# Patient Record
Sex: FEMALE | Race: WHITE | Employment: OTHER | URBAN - METROPOLITAN AREA
[De-identification: names, ages, dates, MRNs, and addresses within clinical notes are randomized per-mention and may not be internally consistent; named-entity substitution may affect disease eponyms.]

---

## 2017-02-06 PROBLEM — F33.1 MODERATE EPISODE OF RECURRENT MAJOR DEPRESSIVE DISORDER (HCC): Status: ACTIVE | Noted: 2017-02-06

## 2017-03-03 PROBLEM — E03.9 ACQUIRED HYPOTHYROIDISM: Status: ACTIVE | Noted: 2017-03-03

## 2017-03-03 PROBLEM — E78.5 HYPERLIPIDEMIA: Status: ACTIVE | Noted: 2017-03-03

## 2017-10-16 PROBLEM — E66.09 CLASS 2 OBESITY DUE TO EXCESS CALORIES WITHOUT SERIOUS COMORBIDITY WITH BODY MASS INDEX (BMI) OF 35.0 TO 35.9 IN ADULT: Status: ACTIVE | Noted: 2017-10-16

## 2017-11-25 ENCOUNTER — HOSPITAL ENCOUNTER (EMERGENCY)
Age: 57
Discharge: HOME | End: 2017-11-25
Attending: EMERGENCY MEDICINE
Payer: COMMERCIAL

## 2017-11-25 ENCOUNTER — APPOINTMENT (OUTPATIENT)
Dept: GENERAL RADIOLOGY | Age: 57
End: 2017-11-25
Attending: EMERGENCY MEDICINE
Payer: COMMERCIAL

## 2017-11-25 VITALS
RESPIRATION RATE: 18 BRPM | HEART RATE: 93 BPM | SYSTOLIC BLOOD PRESSURE: 152 MMHG | WEIGHT: 204 LBS | TEMPERATURE: 98.2 F | DIASTOLIC BLOOD PRESSURE: 70 MMHG | OXYGEN SATURATION: 99 % | BODY MASS INDEX: 34.83 KG/M2 | HEIGHT: 64 IN

## 2017-11-25 DIAGNOSIS — M86.172 ACUTE OSTEOMYELITIS OF LEFT FOOT (HCC): Primary | ICD-10-CM

## 2017-11-25 LAB
ALBUMIN SERPL-MCNC: 3.9 G/DL (ref 3.5–5)
ALBUMIN/GLOB SERPL: 1.1 {RATIO} (ref 1.2–3.5)
ALP SERPL-CCNC: 104 U/L (ref 50–136)
ALT SERPL-CCNC: 24 U/L (ref 12–65)
ANION GAP SERPL CALC-SCNC: 12 MMOL/L (ref 7–16)
AST SERPL-CCNC: 15 U/L (ref 15–37)
BASOPHILS # BLD: 0 K/UL (ref 0–0.2)
BASOPHILS NFR BLD: 0 % (ref 0–2)
BILIRUB SERPL-MCNC: 0.4 MG/DL (ref 0.2–1.1)
BUN SERPL-MCNC: 16 MG/DL (ref 6–23)
CALCIUM SERPL-MCNC: 9.2 MG/DL (ref 8.3–10.4)
CHLORIDE SERPL-SCNC: 99 MMOL/L (ref 98–107)
CO2 SERPL-SCNC: 24 MMOL/L (ref 21–32)
CREAT SERPL-MCNC: 1.04 MG/DL (ref 0.6–1)
DIFFERENTIAL METHOD BLD: ABNORMAL
EOSINOPHIL # BLD: 0.3 K/UL (ref 0–0.8)
EOSINOPHIL NFR BLD: 3 % (ref 0.5–7.8)
ERYTHROCYTE [DISTWIDTH] IN BLOOD BY AUTOMATED COUNT: 12.4 % (ref 11.9–14.6)
GLOBULIN SER CALC-MCNC: 3.6 G/DL (ref 2.3–3.5)
GLUCOSE SERPL-MCNC: 91 MG/DL (ref 65–100)
HCT VFR BLD AUTO: 30.7 % (ref 35.8–46.3)
HGB BLD-MCNC: 12.5 G/DL (ref 11.7–15.4)
IMM GRANULOCYTES # BLD: 0 K/UL (ref 0–0.5)
IMM GRANULOCYTES NFR BLD AUTO: 0 % (ref 0–5)
LACTATE BLD-SCNC: 0.5 MMOL/L (ref 0.5–1.9)
LYMPHOCYTES # BLD: 1.9 K/UL (ref 0.5–4.6)
LYMPHOCYTES NFR BLD: 19 % (ref 13–44)
MCH RBC QN AUTO: 36.7 PG (ref 26.1–32.9)
MCHC RBC AUTO-ENTMCNC: 40.7 G/DL (ref 31.4–35)
MCV RBC AUTO: 90 FL (ref 79.6–97.8)
MONOCYTES # BLD: 0.8 K/UL (ref 0.1–1.3)
MONOCYTES NFR BLD: 8 % (ref 4–12)
NEUTS SEG # BLD: 6.9 K/UL (ref 1.7–8.2)
NEUTS SEG NFR BLD: 70 % (ref 43–78)
PLATELET # BLD AUTO: 362 K/UL (ref 150–450)
PMV BLD AUTO: 9.7 FL (ref 10.8–14.1)
POTASSIUM SERPL-SCNC: 3.9 MMOL/L (ref 3.5–5.1)
PROT SERPL-MCNC: 7.5 G/DL (ref 6.3–8.2)
RBC # BLD AUTO: 3.41 M/UL (ref 4.05–5.25)
SODIUM SERPL-SCNC: 135 MMOL/L (ref 136–145)
WBC # BLD AUTO: 10 K/UL (ref 4.3–11.1)

## 2017-11-25 PROCEDURE — 85025 COMPLETE CBC W/AUTO DIFF WBC: CPT | Performed by: EMERGENCY MEDICINE

## 2017-11-25 PROCEDURE — 83605 ASSAY OF LACTIC ACID: CPT

## 2017-11-25 PROCEDURE — 87040 BLOOD CULTURE FOR BACTERIA: CPT | Performed by: EMERGENCY MEDICINE

## 2017-11-25 PROCEDURE — 87070 CULTURE OTHR SPECIMN AEROBIC: CPT | Performed by: EMERGENCY MEDICINE

## 2017-11-25 PROCEDURE — 80053 COMPREHEN METABOLIC PANEL: CPT | Performed by: EMERGENCY MEDICINE

## 2017-11-25 PROCEDURE — 73630 X-RAY EXAM OF FOOT: CPT

## 2017-11-25 PROCEDURE — 99282 EMERGENCY DEPT VISIT SF MDM: CPT | Performed by: EMERGENCY MEDICINE

## 2017-11-25 PROCEDURE — 87186 SC STD MICRODIL/AGAR DIL: CPT | Performed by: EMERGENCY MEDICINE

## 2017-11-25 PROCEDURE — 87077 CULTURE AEROBIC IDENTIFY: CPT | Performed by: EMERGENCY MEDICINE

## 2017-11-25 RX ORDER — CIPROFLOXACIN 500 MG/1
750 TABLET ORAL 2 TIMES DAILY
Qty: 20 TAB | Refills: 0 | Status: SHIPPED | OUTPATIENT
Start: 2017-11-25 | End: 2017-12-05

## 2017-11-26 NOTE — DISCHARGE INSTRUCTIONS
Osteomyelitis: Care Instructions  Your Care Instructions  Osteomyelitis (say \"nr-[id number]-[id number]-wb-rt-VE-tus\") is a bone infection. It is caused by bacteria. The bacteria can infect the bone where it has been injured, or they can be carried through the blood from another area in the body. Osteomyelitis can be a short- or long-term problem. It is treated with antibiotics. You may get the antibiotics as pills or through a needle in a vein (IV). You will probably get treatment in the hospital at first. The type of treatment depends on the type of bacteria causing the infection, the bones affected, and how bad the infection is. Sometimes people need surgery to drain pus from bone or to fix damaged bone. Short-term osteomyelitis that is treated right away usually can be cured. But the long-term form sometimes comes back after treatment. You can help your chances of stopping the infection by taking your medicines as directed. Follow-up care is a key part of your treatment and safety. Be sure to make and go to all appointments, and call your doctor if you are having problems. It's also a good idea to know your test results and keep a list of the medicines you take. How can you care for yourself at home? · Take your antibiotics as directed. Do not stop taking them just because you feel better. You need to take the full course of antibiotics. · Take pain medicines exactly as directed. ¨ If the doctor gave you a prescription medicine for pain, take it as prescribed. ¨ If you are not taking a prescription pain medicine, ask your doctor if you can take an over-the-counter medicine. · Do mild exercise and stretching if your doctor says it is okay. This can help keep your bones and muscles healthy. Avoid strenuous work or exercise until your doctor says you can do it. · Consider physical therapy if your doctor suggests it. Physical therapy may help you have a normal range of movement. · Do not smoke.  Smoking can slow healing of the infection. If you need help quitting, talk to your doctor about stop-smoking programs and medicines. These can increase your chances of quitting for good. When should you call for help? Call 911 anytime you think you may need emergency care. For example, call if:  ? · You have severe bone pain. ?Call your doctor now or seek immediate medical care if:  ? · You continue to have bone pain. ? · You have signs of infection, such as:  ¨ Increased pain, swelling, warmth, or redness. ¨ Red streaks leading from a wound. ¨ Pus draining from a wound. ¨ A fever. ? Watch closely for changes in your health, and be sure to contact your doctor if:  ? · You do not get better as expected. Where can you learn more? Go to http://gerardo-shakira.info/. Enter Y271 in the search box to learn more about \"Osteomyelitis: Care Instructions. \"  Current as of: March 20, 2017  Content Version: 11.4  © 8839-6834 [employer]. Care instructions adapted under license by [employer] (which disclaims liability or warranty for this information). If you have questions about a medical condition or this instruction, always ask your healthcare professional. [place] [address] any warranty or liability for your use of this information.

## 2017-11-26 NOTE — ED NOTES
Patient to ED with significant swelling, redness to LLE. Patient reports significant swelling over the past 24 hours as well as a weeping wound to the foot with puss production. Patient states had foot surgery in August, had one infection since. Patient in contact with surgeon's office, was written ABX x 2.

## 2017-11-26 NOTE — ED PROVIDER NOTES
Patient is a 59 y.o. female presenting with skin problem. The history is provided by the patient. Skin Problem    This is a new problem. The current episode started 2 days ago. The problem has been gradually worsening. Associated with: at site of previous surgery for arthritis in August. There has been no fever. Affected Location: left dorsal foot. The pain is moderate. The pain has been constant since onset. Associated symptoms include pain and weeping. Treatments tried: started Cipro and Augmentin yesterday.         Past Medical History:   Diagnosis Date    Allergic rhinitis 4/3/2014    Anxiety 5/30/2014    Asthma 5/30/2014    Circadian rhythm disorder     COPD (chronic obstructive pulmonary disease) ([facility] [address] 4/3/2014    DDD (degenerative disc disease) 4/3/2014    Depression, major, recurrent ([facility] [address] 4/3/2014    controlled with meds     GERD (gastroesophageal reflux disease) 4/3/2014    controlled with meds     HLD (hyperlipidemia) 4/3/2014    Hyposmolality and/or hyponatremia 4/3/2014    Hypothyroidism 5/30/2014    controlled with medication     Leg edema     Neurogenic bladder 8/14    self cath 4 times daily, sees Dr. Caba Shows Obesity 4/3/2014    Sleep apnea 5/10/2014    CPAP machine at hs     Unspecified adverse effect of anesthesia     difficult to wake up     Vitamin D deficiency 4/3/2014    Xerostomia 4/3/2014       Past Surgical History:   Procedure Laterality Date    HX APPENDECTOMY      HX CARPAL TUNNEL RELEASE Right     HX HEENT      cataract bilat    HX HYSTERECTOMY  1992    only L ovary remaining     HX OOPHORECTOMY      one side only-unsure which side    HX ORTHOPAEDIC      R carpal tunnel release, L foot surg, R knee    HX TUBAL LIGATION           Family History:   Problem Relation Age of Onset    Cancer Mother      breast    Breast Cancer Mother     Emphysema Father     Lung Disease Father        Social History     Social History    Marital status:      Spouse name: N/A    Number of children: N/A    Years of education: N/A     Occupational History    Not on file. Social History Main Topics    Smoking status: Former Smoker     Packs/day: 1.00     Years: 30.00     Quit date: 2/19/2011    Smokeless tobacco: Never Used    Alcohol use No    Drug use: No    Sexual activity: Not on file     Other Topics Concern    Not on file     Social History Narrative         ALLERGIES: Oxycodone    Review of Systems   Constitutional: Negative for chills and fever. Gastrointestinal: Negative for nausea and vomiting. Neurological: Negative for weakness and numbness. Vitals:    11/25/17 2016   BP: 150/84   Pulse: 91   Resp: 20   Temp: 98 °F (36.7 °C)   SpO2: 100%   Weight: 92.5 kg (204 lb)   Height: 5' 4\" (1.626 m)            Physical Exam   Constitutional: She appears well-developed and well-nourished. Cardiovascular: Normal rate, regular rhythm and normal heart sounds. Pulmonary/Chest: Effort normal and breath sounds normal.   Musculoskeletal:        Feet:    Skin: Skin is warm and dry. There is erythema (warmth and erythema and swelling dorsal foot with a\"her on the dorsolateral foot with. Discharge. Moderate tenderness to palpation. ). Nursing note and vitals reviewed. MDM  Number of Diagnoses or Management Options  Diagnosis management comments: Patient on appropriate antibiotics for osteomyelitis and plain films suggest some osteomyelitis. We will increase the dose of Cipro to 750 mg twice a day. Augmentin 875 twice a day. She has an appointment on Monday with her orthopedic surgeon for follow-up and recheck. We have cultured the wound.        Amount and/or Complexity of Data Reviewed  Clinical lab tests: ordered and reviewed (Results for orders placed or performed during the hospital encounter of 11/25/17  -CBC WITH AUTOMATED DIFF       Result                                            Value                         Ref Range WBC                                               10.0                          4.3 - 11.1 K/uL                 RBC                                               3.41 (L)                      4.05 - 5.25 M/uL                HGB                                               12.5                          11.7 - 15.4 g/dL                HCT                                               30.7 (L)                      35.8 - 46.3 %                   MCV                                               90.0                          79.6 - 97.8 FL                  MCH                                               36.7 (H)                      26.1 - 32.9 PG                  MCHC                                              40.7 (H)                      31.4 - 35.0 g/dL                RDW                                               12.4                          11.9 - 14.6 %                   PLATELET                                          362                           150 - 450 K/uL                  MPV                                               9.7 (L)                       10.8 - 14.1 FL                  DF                                                AUTOMATED                                                     NEUTROPHILS                                       70                            43 - 78 %                       LYMPHOCYTES                                       19                            13 - 44 %                       MONOCYTES                                         8                             4.0 - 12.0 %                    EOSINOPHILS                                       3                             0.5 - 7.8 %                     BASOPHILS                                         0                             0.0 - 2.0 %                     IMMATURE GRANULOCYTES                             0                             0.0 - 5.0 %                     ABS.  NEUTROPHILS 6.9                           1.7 - 8.2 K/UL                  ABS. LYMPHOCYTES                                  1.9                           0.5 - 4.6 K/UL                  ABS. MONOCYTES                                    0.8                           0.1 - 1.3 K/UL                  ABS. EOSINOPHILS                                  0.3                           0.0 - 0.8 K/UL                  ABS. BASOPHILS                                    0.0                           0.0 - 0.2 K/UL                  ABS. IMM. GRANS.                                  0.0                           0.0 - 0.5 K/UL             -METABOLIC PANEL, COMPREHENSIVE       Result                                            Value                         Ref Range                       Sodium                                            135 (L)                       136 - 145 mmol/L                Potassium                                         3.9                           3.5 - 5.1 mmol/L                Chloride                                          99                            98 - 107 mmol/L                 CO2                                               24                            21 - 32 mmol/L                  Anion gap                                         12                            7 - 16 mmol/L                   Glucose                                           91                            65 - 100 mg/dL                  BUN                                               16                            6 - 23 MG/DL                    Creatinine                                        1. 04 (H)                      0.6 - 1.0 MG/DL                 GFR est AA                                        >60                           >60 ml/min/1.73m2               GFR est non-AA                                    58 (L)                        >60 ml/min/1.73m2               Calcium                                           9.2 8.3 - 10.4 MG/DL                Bilirubin, total                                  0.4                           0.2 - 1.1 MG/DL                 ALT (SGPT)                                        24                            12 - 65 U/L                     AST (SGOT)                                        15                            15 - 37 U/L                     Alk. phosphatase                                  104                           50 - 136 U/L                    Protein, total                                    7.5                           6.3 - 8.2 g/dL                  Albumin                                           3.9                           3.5 - 5.0 g/dL                  Globulin                                          3.6 (H)                       2.3 - 3.5 g/dL                  A-G Ratio                                         1.1 (L)                       1.2 - 3.5                  -POC LACTIC ACID       Result                                            Value                         Ref Range                       Lactic Acid (POC)                                 0.5                           0.5 - 1.9 mmol/L           )  Tests in the radiology section of CPT®: ordered and reviewed (Xr Foot Lt Min 3 V    Result Date: 11/25/2017  AP, lateral, oblique views left foot INDICATION: Foot pain, infection. Evaluate for osteomyelitis. COMPARISON: None FINDINGS: Postsurgical fusion at the first, second and third tarsometatarsal joints. Hardware elements appear intact. Sharp truncation at the bases of the fourth and fifth metatarsals may be postsurgical. There is osseous irregularity at the anterior margin of the cuboid. There is soft tissue gas at the lateral aspect of the foot. Findings suspicious for osteomyelitis. Bones are osteopenic. There is soft tissue swelling. IMPRESSION: 1.  Osteomyelitis is suspected, especially in the area of the third and fourth tarsometatarsal joints.  MRI is more sensitive.    )      ED Course       Procedures

## 2017-11-26 NOTE — ED NOTES
I have reviewed discharge instructions with the patient and spouse. The patient and spouse verbalized understanding. Patient left ED via Discharge Method: wheelchair to Home with spouse. Opportunity for questions and clarification provided. Patient given 1 scripts. To continue your aftercare when you leave the hospital, you may receive an automated call from our care team to check in on how you are doing. This is a free service and part of our promise to provide the best care and service to meet your aftercare needs.  If you have questions, or wish to unsubscribe from this service please call [phone]. Thank [facility].

## 2017-11-29 LAB
BACTERIA SPEC CULT: ABNORMAL
GRAM STN SPEC: ABNORMAL
GRAM STN SPEC: ABNORMAL
SERVICE CMNT-IMP: ABNORMAL

## 2017-11-30 LAB
BACTERIA SPEC CULT: NORMAL
SERVICE CMNT-IMP: NORMAL

## 2018-02-12 ENCOUNTER — HOSPITAL ENCOUNTER (OUTPATIENT)
Dept: MAMMOGRAPHY | Age: 58
Discharge: HOME | End: 2018-02-12
Attending: FAMILY MEDICINE
Payer: COMMERCIAL

## 2018-02-12 DIAGNOSIS — Z12.31 VISIT FOR SCREENING MAMMOGRAM: ICD-10-CM

## 2018-02-12 PROCEDURE — 77067 SCR MAMMO BI INCL CAD: CPT

## 2018-02-21 PROBLEM — E66.9 OBESITY (BMI 30-39.9): Status: ACTIVE | Noted: 2018-02-21

## 2018-02-21 PROBLEM — Z99.89 OBSTRUCTIVE SLEEP APNEA ON CPAP: Status: ACTIVE | Noted: 2018-02-21

## 2018-02-21 PROBLEM — G47.33 OBSTRUCTIVE SLEEP APNEA ON CPAP: Status: ACTIVE | Noted: 2018-02-21

## 2018-07-20 PROBLEM — E66.01 SEVERE OBESITY (BMI 35.0-39.9): Status: ACTIVE | Noted: 2018-07-20

## 2018-07-30 ENCOUNTER — HOSPITAL ENCOUNTER (OUTPATIENT)
Dept: MAMMOGRAPHY | Age: 58
Discharge: HOME | End: 2018-07-30
Attending: FAMILY MEDICINE
Payer: COMMERCIAL

## 2018-07-30 DIAGNOSIS — Z78.0 POSTMENOPAUSAL: ICD-10-CM

## 2018-07-30 PROBLEM — M85.89 OSTEOPENIA OF MULTIPLE SITES: Status: ACTIVE | Noted: 2018-07-30

## 2018-07-30 PROCEDURE — 77080 DXA BONE DENSITY AXIAL: CPT

## 2018-07-30 NOTE — PROGRESS NOTES
Bone density is worse, now falling in the range of OSTEOPENIA. I would recommend starting EVISTA - once daily medication to help prevent further bone loss. Will send rx to pharmacy.

## 2018-07-31 ENCOUNTER — HOSPITAL ENCOUNTER (OUTPATIENT)
Dept: PHYSICAL THERAPY | Age: 58
Discharge: HOME | End: 2018-07-31
Attending: FAMILY MEDICINE
Payer: COMMERCIAL

## 2018-07-31 DIAGNOSIS — R26.89 BALANCE DISORDER: ICD-10-CM

## 2018-07-31 PROCEDURE — 97162 PT EVAL MOD COMPLEX 30 MIN: CPT

## 2018-07-31 PROCEDURE — 97112 NEUROMUSCULAR REEDUCATION: CPT

## 2018-07-31 PROCEDURE — G8978 MOBILITY CURRENT STATUS: HCPCS

## 2018-07-31 PROCEDURE — G8979 MOBILITY GOAL STATUS: HCPCS

## 2018-07-31 NOTE — THERAPY EVALUATION
Devonte Clarke  : 1960  Primary: Valentino Darden [facility]*  Secondary: [address] at [facility]  [address] [facility], [address] [place] [address],[address], [address]  [phone]   [phone]          OUTPATIENT PHYSICAL THERAPY:Initial Assessment 2018   ICD-10: Treatment Diagnosis:   · Other abnormalities of gait and mobility (R26.89)  · Difficulty in walking, not elsewhere classified (R26.2)  Precautions/Allergies:   Oxycodone   Fall Risk Score: 0 (? 5 = High Risk)  MD Orders: Evaluate and Treat MEDICAL/REFERRING DIAGNOSIS:  Balance disorder [R26.89]   DATE OF ONSET: Chronic  REFERRING PHYSICIAN: Delfino Hashimoto, *  RETURN PHYSICIAN APPOINTMENT: TBD     INITIAL ASSESSMENT:  Ms. Cedrick Cole presents with decreased mobility, decreased strength, and decreased endurance secondary to degenerative changes. After discussing with patient, she agreed she would benefit from physical therapy to improve above deficits. Please sign this plan of treatment if you concur. Thank you for the opportunity to serve this patient. PROBLEM LIST (Impacting functional limitations):  1. Decreased Strength  2. Decreased ADL/Functional Activities  3. Decreased Ambulation Ability/Technique  4. Decreased Balance  5. Decreased Activity Tolerance INTERVENTIONS PLANNED:  1. Balance Exercise  2. Cold  3. Home Exercise Program (HEP)  4. Neuromuscular Re-education/Strengthening  5. Therapeutic Activites  6. Therapeutic Exercise/Strengthening   TREATMENT PLAN:  Effective Dates: 2018 TO 10/25/2018 (90 days). Frequency/Duration: 2 times a week for 90 Days  GOALS: (Goals have been discussed and agreed upon with patient.)  Short-Term Functional Goals: Time Frame: 30 days  1. Patient will be independent with home exercise program without exacerbation of symptoms or cueing needed. Discharge Goals: Time Frame: 90 days  1.  Patient will be independent with all ADLs with minimal fear of falling and loss of balance with daily tasks. 2. Patient will report no fear avoidance with social or recreational activities due to fear of falling. 3. Patient will score greater than or equal to 48/56 on Pena Balance Scale with minimal effect of imbalance on patient's ability to manage every day life activities. 4. Patient will score greater than or equal to 19/24 on Dynamic Gait Index with minimal effect of imbalance on patient's ability to manage every day life activities. Rehabilitation Potential For Stated Goals: Good  Regarding Wei Kemp therapy, I certify that the treatment plan above will be carried out by a therapist or under their direction. Thank you for this referral,  Shari Vazquez, PT     Referring Physician Signature: No Galvan, *              Date                    The information in this section was collected on 7/31/2018 (except where otherwise noted). HISTORY:   History of Present Injury/Illness (Reason for Referral):  Patient reports she began to have problems with her balance after her last foot surgery. She report she had surgery on the medial part of her left foot to remove arthritis and insert pins/screws. 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 She reports she was in a wheelchair for on/off a year. She reports she was receiving IV antibiotics for 6 months. She reports that she does not walk with any assistive device. She reports she does wobble when she walks and is afraid of falling. She reports she would like to improve her overall balance and mobility. Past Medical History/Comorbidities:   Ms. Yi Lange  has a past medical history of Allergic rhinitis (4/3/2014); Anxiety (5/30/2014); Asthma (5/30/2014); Circadian rhythm disorder; COPD (chronic obstructive pulmonary disease) ([facility] [address] (4/3/2014); DDD (degenerative disc disease) (4/3/2014);  Depression, major, recurrent ([facility] [address] (4/3/2014); GERD (gastroesophageal reflux disease) (4/3/2014); HLD (hyperlipidemia) (4/3/2014); Hyposmolality and/or hyponatremia (4/3/2014); Hypothyroidism (5/30/2014); Leg edema; Neurogenic bladder (8/14); Obesity (4/3/2014); Sleep apnea (5/10/2014); Unspecified adverse effect of anesthesia; Vitamin D deficiency (4/3/2014); and Xerostomia (4/3/2014). Ms. Tee Husbands  has a past surgical history that includes hx appendectomy; hx carpal tunnel release (Right); hx hysterectomy (1992); hx oophorectomy; hx tubal ligation; hx heent; and hx orthopaedic. Social History/Living Environment:   Home Environment: Private residence  Living Alone: No  Support Systems: Family member(s), Friends \ neighbors  Prior Level of Function/Work/Activity:  Independent  Dominant Side:         RIGHT  Personal Factors:          Sex:  female        Age:  59 y.o. Current Medications:       Current Outpatient Prescriptions:     raloxifene (EVISTA) 60 mg tablet, Take 1 Tab by mouth daily. Indications: osteopenia, Disp: 30 Tab, Rfl: 6    diclofenac EC (VOLTAREN) 75 mg EC tablet, Take 75 mg by mouth., Disp: , Rfl:     DISABLED PLACARD (DISABLED PLACARD) [facility], Supply prescription to [facility] with completed form [id number]., Disp: , Rfl:     buPROPion XL (WELLBUTRIN XL) 300 mg XL tablet, , Disp: , Rfl: 1    buPROPion XL (WELLBUTRIN XL) 150 mg tablet, , Disp: , Rfl: 1    SYNTHROID 137 mcg tablet, take 1 tablet by mouth once daily BEFORE BREAKFAST, Disp: 30 Tab, Rfl: 1    clonazePAM (KLONOPIN) 0.5 mg tablet, Take  by mouth three (3) times daily. , Disp: , Rfl:     cholecalciferol, VITAMIN D3, (VITAMIN D3) 5,000 unit tab tablet, Take  by mouth daily. , Disp: , Rfl:     cpap machine kit, by Does Not Apply route., Disp: , Rfl:     trimethoprim-sulfamethoxazole (BACTRIM DS, SEPTRA DS) 160-800 mg per tablet, take 1 tablet by mouth twice a day, Disp: , Rfl: 0    ARIPiprazole (ABILIFY) 15 mg tablet, , Disp: , Rfl: 0    liothyronine (CYTOMEL) 5 mcg tablet, Take 1 Tab by mouth daily. , Disp: 30 Tab, Rfl: 12    fluticasone-salmeterol (ADVAIR DISKUS) 100-50 mcg/dose diskus inhaler, Take 1 Puff by inhalation every twelve (12) hours. , Disp: 1 Inhaler, Rfl: 12    tiotropium (SPIRIVA WITH HANDIHALER) 18 mcg inhalation capsule, Take 1 Cap by inhalation daily. , Disp: 30 Cap, Rfl: 12    albuterol (PROVENTIL HFA, VENTOLIN HFA, PROAIR HFA) 90 mcg/actuation inhaler, Take 2 Puffs by inhalation every four (4) hours as needed for Wheezing., Disp: 1 Inhaler, Rfl: 12    omeprazole (PRILOSEC) 40 mg capsule, Take 1 Cap by mouth daily. Indications: Barretts esophagus, Disp: 30 Cap, Rfl: 12    DISABLED PLACARD (DISABLED PLACARD) [employer], Supply prescription to [facility] with completed form [id number]., Disp: , Rfl:     acetaminophen (TYLENOL) 650 mg CR tablet, Take 1 Tab by mouth., Disp: , Rfl:     AMITIZA 24 mcg capsule, Take 24 mcg by mouth., Disp: , Rfl: 0    lidocaine (XYLOCAINE) 2 % solution, , Disp: , Rfl:     vitamin c-vitamin e cap, Take  by mouth., Disp: , Rfl:     guaiFENesin ER (MUCINEX) 600 mg ER tablet, Take 600 mg by mouth two (2) times a day., Disp: , Rfl:     cyanocobalamin (VITAMIN B-12) 1,000 mcg tablet, Take 1,000 mcg by mouth daily. , Disp: , Rfl:     omega-3 fatty acids-vitamin e (FISH OIL) 1,000 mg cap, Take 1 capsule by mouth daily. Last dose 12/5/14, Disp: , Rfl:     cholecalciferol, vitamin D3, (VITAMIN D3) 2,000 unit tab, Take 1 capsule by mouth every evening. Last dose 12/5, Disp: , Rfl:     multivitamins-minerals-lutein (CENTRUM SILVER) tab tablet, Take 1 tablet by mouth daily.  Last dose 12/5, Disp: , Rfl:    Date Last Reviewed:  7/31/2018    Number of Personal Factors/Comorbidities that affect the Plan of Care: 1-2: MODERATE COMPLEXITY   EXAMINATION:   Observation/Orthostatic Postural Assessment:          Posture Assessment: Rounded shoulders, Forward head   Functional Mobility:         Gait/Ambulation:     Base of Support: Center of gravity altered  Speed/Dara: [employer] decreased (<100 feet/min)  Step Length: Left shortened, Right shortened  Swing Pattern: Left asymmetrical, Right asymmetrical  Stance: Left increased, Right increased  Gait Abnormalities: Antalgic  Ambulation - Level of Assistance: Independent  · Interventions: Verbal cues, Safety awareness training          Transfers:     Sit to Stand: Modified independent  Stand to Sit: Modified independent  Stand Pivot Transfers: Modified independent  Bed to Chair: Modified independent  Lateral Transfers: Modified independent         Bed Mobility:     Rolling: Independent  Supine to Sit: Independent  Sit to Supine: Independent  Scooting: Independent      Strength:          UE STRENGTH: 4/5 shoulder flexion, 4/5 shoulder abduction, 4/5 shoulder extension, 4/5 elbow flexion, 4/5 elbow extension. LE STRENGTH:  4/5 hip flexion, 4/5 hip abduction, 4/5 hip adduction, 4/5 hip extension, 4/5 knee extension, 4/5 knee flexion, 2/5 ankle dorsiflexion, 2/5 ankle plantarflexion, 2/5 ankle inversion, 2/5 ankle eversion. Sensation:         Intact  Postural Control & Balance:  · Pena Balance Scale:  46/56.   (A score less than 45/56 indicates high risk of falls)     · Dynamic Gait Index:  15/24.   (A score less than or equal to19/24 is abnormal and predictive of falls)        Body Structures Involved:  1. Nerves  2. Joints  3. Muscles Body Functions Affected:  1. Neuromusculoskeletal  2. Movement Related Activities and Participation Affected:  1. Mobility  2. Self Care   Number of elements (examined above) that affect the Plan of Care: 4+: HIGH COMPLEXITY   CLINICAL PRESENTATION:   Presentation: Evolving clinical presentation with changing clinical characteristics: MODERATE COMPLEXITY   CLINICAL DECISION MAKING:   Outcome Measure:    Tool Used: Pena Balance Scale  Score:  Initial: 46/56 Most Recent: X/56 (Date: -- )   Interpretation of Score: Each section is scored on a 0-4 scale, 0 representing the patients inability to perform the task and 4 representing independence. The scores of each section are added together for a total score of 56. The higher the patients score, the more independent the patient is. Any score below 45 indicates increased risk for falls. Score 56 55-45 44-34 33-23 22-12 11-1 0   Modifier CH CI CJ CK CL CM CN     Tool Used: Dynamic Gait Index  Score:  Initial: 15/24 Most Recent: X/24 (Date: -- )   Interpretation of Score: Each section is scored on a 0-3 scale, 0 representing the patients inability to perform the task and 3 representing independence. The scores of each section are added together for a total score of 24. Any score below 19 indicates increased risk for falls. Score 24 23-19 18-15 14-10 9-5 4-1 0   Modifier CH CI CJ CK CL CM CN     ? Mobility - Walking and Moving Around:     - CURRENT STATUS: CJ - 20%-39% impaired, limited or restricted    - GOAL STATUS: CI - 1%-19% impaired, limited or restricted    - D/C STATUS:  ---------------To be determined---------------    Medical Necessity:   · Patient is expected to demonstrate progress in strength, range of motion, balance and coordination to improve safety during daily activities. · Patient demonstrates good rehab potential due to higher previous functional level. · Skilled intervention continues to be required due to decreased mobility. Reason for Services/Other Comments:  · Patient continues to demonstrate capacity to improve overall mobility which will increase independence and increase safety. Use of outcome tool(s) and clinical judgement create a POC that gives a: Questionable prediction of patient's progress: MODERATE COMPLEXITY            TREATMENT:   (In addition to Assessment/Re-Assessment sessions the following treatments were rendered)  Pre-treatment Symptoms/Complaints:  7/31/2018: Patient reports she is afraid of falling.    Pain: Initial: Pain Intensity 1: 0  Post Session:  0/10     NEUROMUSCULAR RE-EDUCATION: (15 minutes): Exercise/activities per grid below to improve balance, coordination, kinesthetic sense, posture and proprioception. Required minimal visual, verbal and manual cues to promote static and dynamic balance in standing, promote coordination of bilateral, lower extremity(s) and promote motor control of bilateral, lower extremity(s). Date:  7/31/2018   Activity/Exercise Parameters   Walking with head turns: right/left HEP   Walking with head nods: up/down HEP   Standing heel raises (minimal UE assist) HEP   Standing toe raises (minimal UE assist) HEP      [facility] Portal  Treatment/Session Assessment:    · Response to Treatment:  Patient tolerated assessment without complaints of increased pain, loss of balance, or fatigue. Patient verbalized and demonstrated understanding of HEP. · Compliance with Program/Exercises: Will assess as treatment progresses. · Recommendations/Intent for next treatment session: \"Next visit will focus on advancements to more challenging activities\".   Total Treatment Duration:  PT Patient Time In/Time Out  Time In: 0815  Time Out: 0900    Jb Pearson PT

## 2018-07-31 NOTE — PROGRESS NOTES
Justin Parks  : 1960 [facility]  [facility] [address] [address], [place] [address].  [phone]   [phone]          OUTPATIENT ORTHOPAEDIC PHYSICAL THERAPY    NAME/AGE/GENDER: Justin Parks is a 59 y.o. female    DATE: 2018                       Ambulatory/Rehab Services H2 Model Falls Risk Assessment    Risk Factor Pts. ·   Confusion/Disorientation/Impulsivity  []    4 ·   Symptomatic Depression  []   2 ·   Altered Elimination  []   1 ·   Dizziness/Vertigo  []   1 ·   Gender (Male)  []   1 ·   Any administered antiepileptics (anticonvulsants):  []   2 ·   Any administered benzodiazepines:  []   1 ·   Visual Impairment (specify):  []   1 ·   Portable Oxygen Use  []   1 ·   Orthostatic ? BP  []   1 ·   History of Recent Falls (within 3 mos.)  []   5     Ability to Rise from Chair (choose one) Pts. ·   Ability to rise in a single movement  [x]   0 ·   Pushes up, successful in one attempt  []   1 ·   Multiple attempts, but unsuccessful  []   3 ·   Unable to rise without assistance  []   4   Total: (5 or greater = High Risk) 0     Falls Prevention Plan:   []                Physical Limitations to Exercise (specify):   []                Mobility Assistance Device (type):   []                Exercise/Equipment Adaptation (specify):    © [facility] of [place] . [facility] Patent #[id number].  Federal Law prohibits the replication, distribution or use without written permission from [facility] of [address] [place] [address]

## 2018-08-02 ENCOUNTER — HOSPITAL ENCOUNTER (OUTPATIENT)
Dept: PHYSICAL THERAPY | Age: 58
Discharge: HOME | End: 2018-08-02
Attending: FAMILY MEDICINE
Payer: COMMERCIAL

## 2018-08-02 PROCEDURE — 97112 NEUROMUSCULAR REEDUCATION: CPT

## 2018-08-02 NOTE — PROGRESS NOTES
Pastor Ruiz  : 1960  Primary: [facility]  Secondary: [address] at [facility]  [address] [facility], [address] [place] [address],[address]  [phone]   [phone]          OUTPATIENT PHYSICAL THERAPY:Daily Note 2018   ICD-10: Treatment Diagnosis:   · Other abnormalities of gait and mobility (R26.89)  · Difficulty in walking, not elsewhere classified (R26.2)  Precautions/Allergies:   Oxycodone   Fall Risk Score: 0 (? 5 = High Risk)  MD Orders: Evaluate and Treat MEDICAL/REFERRING DIAGNOSIS:  Other abnormalities of gait and mobility [R26.89]   DATE OF ONSET: Chronic  REFERRING PHYSICIAN: Edwin Sandhu, *  RETURN PHYSICIAN APPOINTMENT: TBD     ASSESSMENT:  Ms. Yaz Akins presents with decreased mobility, decreased strength, and decreased endurance secondary to degenerative changes. Patient has attended a total of 2 scheduled physical therapy visits including initial evaluation on 2018. Treatment has consisted of balance and strengthening activities to improve overall mobility and performance with activities of daily living. PROBLEM LIST (Impacting functional limitations):  1. Decreased Strength  2. Decreased ADL/Functional Activities  3. Decreased Ambulation Ability/Technique  4. Decreased Balance  5. Decreased Activity Tolerance INTERVENTIONS PLANNED:  1. Balance Exercise  2. Cold  3. Home Exercise Program (HEP)  4. Neuromuscular Re-education/Strengthening  5. Therapeutic Activites  6. Therapeutic Exercise/Strengthening   TREATMENT PLAN:  Effective Dates: 2018 TO 10/25/2018 (90 days). Frequency/Duration: 2 times a week for 90 Days  GOALS: (Goals have been discussed and agreed upon with patient.)  Short-Term Functional Goals: Time Frame: 30 days  1. Patient will be independent with home exercise program without exacerbation of symptoms or cueing needed. Discharge Goals: Time Frame: 90 days  1.  Patient will be independent with all ADLs with minimal fear of falling and loss of balance with daily tasks. 2. Patient will report no fear avoidance with social or recreational activities due to fear of falling. 3. Patient will score greater than or equal to 48/56 on Pena Balance Scale with minimal effect of imbalance on patient's ability to manage every day life activities. 4. Patient will score greater than or equal to 19/24 on Dynamic Gait Index with minimal effect of imbalance on patient's ability to manage every day life activities. Rehabilitation Potential For Stated Goals: Good            The information in this section was collected on 7/31/2018 (except where otherwise noted). HISTORY:   History of Present Injury/Illness (Reason for Referral):  Patient reports she began to have problems with her balance after her last foot surgery. She report she had surgery on the medial part of her left foot to remove arthritis and insert pins/screws. 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 She reports she was in a wheelchair for on/off a year. She reports she was receiving IV antibiotics for 6 months. She reports that she does not walk with any assistive device. She reports she does wobble when she walks and is afraid of falling. She reports she would like to improve her overall balance and mobility. Past Medical History/Comorbidities:   Ms. Cedrick Cole  has a past medical history of Allergic rhinitis (4/3/2014); Anxiety (5/30/2014); Asthma (5/30/2014); Circadian rhythm disorder; COPD (chronic obstructive pulmonary disease) ([facility] [address] (4/3/2014); DDD (degenerative disc disease) (4/3/2014); Depression, major, recurrent ([facility] [address] (4/3/2014); GERD (gastroesophageal reflux disease) (4/3/2014); HLD (hyperlipidemia) (4/3/2014); Hyposmolality and/or hyponatremia (4/3/2014); Hypothyroidism (5/30/2014); Leg edema; Neurogenic bladder (8/14); Obesity (4/3/2014);  Sleep apnea (5/10/2014); Unspecified adverse effect of anesthesia; Vitamin D deficiency (4/3/2014); and Xerostomia (4/3/2014). Ms. Jose Hobbs  has a past surgical history that includes hx appendectomy; hx carpal tunnel release (Right); hx hysterectomy (1992); hx oophorectomy; hx tubal ligation; hx heent; and hx orthopaedic. Social History/Living Environment:   Home Environment: Private residence  Living Alone: No  Support Systems: Family member(s), Friends \ neighbors  Prior Level of Function/Work/Activity:  Independent  Dominant Side:         RIGHT  Personal Factors:          Sex:  female        Age:  59 y.o. Current Medications:       Current Outpatient Prescriptions:     umeclidinium (INCRUSE ELLIPTA) 62.5 mcg/actuation inhaler, Take 1 Puff by inhalation daily. , Disp: 1 Inhaler, Rfl: 5    raloxifene (EVISTA) 60 mg tablet, Take 1 Tab by mouth daily. Indications: osteopenia, Disp: 30 Tab, Rfl: 6    diclofenac EC (VOLTAREN) 75 mg EC tablet, Take 75 mg by mouth., Disp: , Rfl:     DISABLED PLACARD (DISABLED PLACARD) DMV, Supply prescription to [facility] with completed form [id number]., Disp: , Rfl:     buPROPion XL (WELLBUTRIN XL) 300 mg XL tablet, , Disp: , Rfl: 1    buPROPion XL (WELLBUTRIN XL) 150 mg tablet, , Disp: , Rfl: 1    SYNTHROID 137 mcg tablet, take 1 tablet by mouth once daily BEFORE BREAKFAST, Disp: 30 Tab, Rfl: 1    clonazePAM (KLONOPIN) 0.5 mg tablet, Take  by mouth three (3) times daily. , Disp: , Rfl:     cholecalciferol, VITAMIN D3, (VITAMIN D3) 5,000 unit tab tablet, Take  by mouth daily. , Disp: , Rfl:     cpap machine kit, by Does Not Apply route., Disp: , Rfl:     trimethoprim-sulfamethoxazole (BACTRIM DS, SEPTRA DS) 160-800 mg per tablet, take 1 tablet by mouth twice a day, Disp: , Rfl: 0    ARIPiprazole (ABILIFY) 15 mg tablet, , Disp: , Rfl: 0    liothyronine (CYTOMEL) 5 mcg tablet, Take 1 Tab by mouth daily. , Disp: 30 Tab, Rfl: 12    fluticasone-salmeterol (ADVAIR DISKUS) 100-50 mcg/dose diskus inhaler, Take 1 Puff by inhalation every twelve (12) hours. , Disp: 1 Inhaler, Rfl: 12    albuterol (PROVENTIL HFA, VENTOLIN HFA, PROAIR HFA) 90 mcg/actuation inhaler, Take 2 Puffs by inhalation every four (4) hours as needed for Wheezing., Disp: 1 Inhaler, Rfl: 12    omeprazole (PRILOSEC) 40 mg capsule, Take 1 Cap by mouth daily. Indications: Barretts esophagus, Disp: 30 Cap, Rfl: 12    DISABLED PLACARD (DISABLED PLACARD) DMV, Supply prescription to [facility] with completed form [id number]., Disp: , Rfl:     acetaminophen (TYLENOL) 650 mg CR tablet, Take 1 Tab by mouth., Disp: , Rfl:     AMITIZA 24 mcg capsule, Take 24 mcg by mouth., Disp: , Rfl: 0    lidocaine (XYLOCAINE) 2 % solution, , Disp: , Rfl:     vitamin c-vitamin e cap, Take  by mouth., Disp: , Rfl:     guaiFENesin ER (MUCINEX) 600 mg ER tablet, Take 600 mg by mouth two (2) times a day., Disp: , Rfl:     cyanocobalamin (VITAMIN B-12) 1,000 mcg tablet, Take 1,000 mcg by mouth daily. , Disp: , Rfl:     omega-3 fatty acids-vitamin e (FISH OIL) 1,000 mg cap, Take 1 capsule by mouth daily. Last dose 12/5/14, Disp: , Rfl:     cholecalciferol, vitamin D3, (VITAMIN D3) 2,000 unit tab, Take 1 capsule by mouth every evening. Last dose 12/5, Disp: , Rfl:     multivitamins-minerals-lutein (CENTRUM SILVER) tab tablet, Take 1 tablet by mouth daily.  Last dose 12/5, Disp: , Rfl:    Date Last Reviewed:  8/2/2018    Number of Personal Factors/Comorbidities that affect the Plan of Care: 1-2: MODERATE COMPLEXITY   EXAMINATION:   Observation/Orthostatic Postural Assessment:          Posture Assessment: Rounded shoulders, Forward head   Functional Mobility:         Gait/Ambulation:     Base of Support: Center of gravity altered  Speed/Dara: Pace decreased (<100 feet/min)  Step Length: Left shortened, Right shortened  Swing Pattern: Left asymmetrical, Right asymmetrical  Stance: Left increased, Right increased  Gait Abnormalities: Antalgic  Ambulation - Level of Assistance: Independent  · Interventions: Verbal cues, Safety awareness training          Transfers:     Sit to Stand: Modified independent  Stand to Sit: Modified independent  Stand Pivot Transfers: Modified independent  Bed to Chair: Modified independent  Lateral Transfers: Modified independent         Bed Mobility:     Rolling: Independent  Supine to Sit: Independent  Sit to Supine: Independent  Scooting: Independent      Strength:          UE STRENGTH: 4/5 shoulder flexion, 4/5 shoulder abduction, 4/5 shoulder extension, 4/5 elbow flexion, 4/5 elbow extension. LE STRENGTH:  4/5 hip flexion, 4/5 hip abduction, 4/5 hip adduction, 4/5 hip extension, 4/5 knee extension, 4/5 knee flexion, 2/5 ankle dorsiflexion, 2/5 ankle plantarflexion, 2/5 ankle inversion, 2/5 ankle eversion. Sensation:         Intact  Postural Control & Balance:  · Pena Balance Scale:  46/56.   (A score less than 45/56 indicates high risk of falls)     · Dynamic Gait Index:  15/24.   (A score less than or equal to19/24 is abnormal and predictive of falls)        Body Structures Involved:  1. Nerves  2. Joints  3. Muscles Body Functions Affected:  1. Neuromusculoskeletal  2. Movement Related Activities and Participation Affected:  1. Mobility  2. Self Care   Number of elements (examined above) that affect the Plan of Care: 4+: HIGH COMPLEXITY   CLINICAL PRESENTATION:   Presentation: Evolving clinical presentation with changing clinical characteristics: MODERATE COMPLEXITY   CLINICAL DECISION MAKING:   Outcome Measure: Tool Used: Pena Balance Scale  Score:  Initial: 46/56 Most Recent: X/56 (Date: -- )   Interpretation of Score: Each section is scored on a 0-4 scale, 0 representing the patients inability to perform the task and 4 representing independence. The scores of each section are added together for a total score of 56. The higher the patients score, the more independent the patient is.   Any score below 45 indicates increased risk for falls.    Score 56 55-45 44-34 33-23 22-12 11-1 0   Modifier CH CI CJ CK CL CM CN     Tool Used: Dynamic Gait Index  Score:  Initial: 15/24 Most Recent: X/24 (Date: -- )   Interpretation of Score: Each section is scored on a 0-3 scale, 0 representing the patients inability to perform the task and 3 representing independence. The scores of each section are added together for a total score of 24. Any score below 19 indicates increased risk for falls. Score 24 23-19 18-15 14-10 9-5 4-1 0   Modifier CH CI CJ CK CL CM CN     ? Mobility - Walking and Moving Around:     - CURRENT STATUS: CJ - 20%-39% impaired, limited or restricted    - GOAL STATUS: CI - 1%-19% impaired, limited or restricted    - D/C STATUS:  ---------------To be determined---------------    Medical Necessity:   · Patient is expected to demonstrate progress in strength, range of motion, balance and coordination to improve safety during daily activities. · Patient demonstrates good rehab potential due to higher previous functional level. · Skilled intervention continues to be required due to decreased mobility. Reason for Services/Other Comments:  · Patient continues to demonstrate capacity to improve overall mobility which will increase independence and increase safety. Use of outcome tool(s) and clinical judgement create a POC that gives a: Questionable prediction of patient's progress: MODERATE COMPLEXITY            TREATMENT:   (In addition to Assessment/Re-Assessment sessions the following treatments were rendered)  Pre-treatment Symptoms/Complaints:  8/2/2018: Patient reports she is doing ok today. She reports it has been a busy day and so she is already a little tired. Pain: Initial: Pain Intensity 1: 0  Post Session:  0/10     NEUROMUSCULAR RE-EDUCATION: (45 minutes):  Exercise/activities per grid below to improve balance, coordination, kinesthetic sense, posture and proprioception.   Required minimal visual, verbal and manual cues to promote static and dynamic balance in standing, promote coordination of bilateral, lower extremity(s) and promote motor control of bilateral, lower extremity(s). Date:  8/2/2018   Activity/Exercise Parameters   Walking with head turns: right/left 4 laps   Walking with head nods: up/down 4 laps   Marching in hallway 4 laps   Walking backward in hallway 4 laps   Semitandem walking in hallway 4 laps   Sidestepping in hallway 4 laps   Tiltboard: right/left Static: eyes open  Dynamic: eyes open   Tiltboard: forward/backward Static: eyes open  Dynamic: eyes closed   Step overs: 1/2 foam roll 10 reps each  [employer]  Treatment/Session Assessment:    · Response to Treatment:  Patient tolerated treatment with minimal complaints of increased dizziness and fatigue. Patient able to complete all activities with a few sitting rest breaks. · Compliance with Program/Exercises: Will assess as treatment progresses. · Recommendations/Intent for next treatment session: \"Next visit will focus on advancements to more challenging activities\".   Total Treatment Duration:  PT Patient Time In/Time Out  Time In: 1630  Time Out: [address], PT

## 2018-08-07 ENCOUNTER — HOSPITAL ENCOUNTER (OUTPATIENT)
Dept: PHYSICAL THERAPY | Age: 58
Discharge: HOME | End: 2018-08-07
Attending: FAMILY MEDICINE
Payer: COMMERCIAL

## 2018-08-07 PROCEDURE — 97110 THERAPEUTIC EXERCISES: CPT

## 2018-08-07 PROCEDURE — 97112 NEUROMUSCULAR REEDUCATION: CPT

## 2018-08-07 NOTE — PROGRESS NOTES
Joel Truong  : 1960  Primary: [facility]  Secondary: [address] at [facility]  [address] [facility], [address] [place] [address],[address]  [phone]   [phone]          OUTPATIENT PHYSICAL THERAPY:Daily Note 2018   ICD-10: Treatment Diagnosis:   · Other abnormalities of gait and mobility (R26.89)  · Difficulty in walking, not elsewhere classified (R26.2)  Precautions/Allergies:   Oxycodone   Fall Risk Score: 0 (? 5 = High Risk)  MD Orders: Evaluate and Treat MEDICAL/REFERRING DIAGNOSIS:  Other abnormalities of gait and mobility [R26.89]   DATE OF ONSET: Chronic  REFERRING PHYSICIAN: Dayton Jnoes, *  RETURN PHYSICIAN APPOINTMENT: TBD     ASSESSMENT:  Ms. Charlene Arguello presents with decreased mobility, decreased strength, and decreased endurance secondary to degenerative changes. Patient has attended a total of 3 scheduled physical therapy visits including initial evaluation on 2018. Treatment has consisted of balance and strengthening activities to improve overall mobility and performance with activities of daily living. PROBLEM LIST (Impacting functional limitations):  1. Decreased Strength  2. Decreased ADL/Functional Activities  3. Decreased Ambulation Ability/Technique  4. Decreased Balance  5. Decreased Activity Tolerance INTERVENTIONS PLANNED:  1. Balance Exercise  2. Cold  3. Home Exercise Program (HEP)  4. Neuromuscular Re-education/Strengthening  5. Therapeutic Activites  6. Therapeutic Exercise/Strengthening   TREATMENT PLAN:  Effective Dates: 2018 TO 10/25/2018 (90 days). Frequency/Duration: 2 times a week for 90 Days  GOALS: (Goals have been discussed and agreed upon with patient.)  Short-Term Functional Goals: Time Frame: 30 days  1. Patient will be independent with home exercise program without exacerbation of symptoms or cueing needed. Discharge Goals: Time Frame: 90 days  1.  Patient will be independent with all ADLs with minimal fear of falling and loss of balance with daily tasks. 2. Patient will report no fear avoidance with social or recreational activities due to fear of falling. 3. Patient will score greater than or equal to 48/56 on Pena Balance Scale with minimal effect of imbalance on patient's ability to manage every day life activities. 4. Patient will score greater than or equal to 19/24 on Dynamic Gait Index with minimal effect of imbalance on patient's ability to manage every day life activities. Rehabilitation Potential For Stated Goals: Good            The information in this section was collected on 7/31/2018 (except where otherwise noted). HISTORY:   History of Present Injury/Illness (Reason for Referral):  Patient reports she began to have problems with her balance after her last foot surgery. She report she had surgery on the medial part of her left foot to remove arthritis and insert pins/screws. 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 She reports she was in a wheelchair for on/off a year. She reports she was receiving IV antibiotics for 6 months. She reports that she does not walk with any assistive device. She reports she does wobble when she walks and is afraid of falling. She reports she would like to improve her overall balance and mobility. Past Medical History/Comorbidities:   Ms. Ron Asencio  has a past medical history of Allergic rhinitis (4/3/2014); Anxiety (5/30/2014); Asthma (5/30/2014); Circadian rhythm disorder; COPD (chronic obstructive pulmonary disease) ([facility] [address] (4/3/2014); DDD (degenerative disc disease) (4/3/2014); Depression, major, recurrent ([facility] [address] (4/3/2014); GERD (gastroesophageal reflux disease) (4/3/2014); HLD (hyperlipidemia) (4/3/2014); Hyposmolality and/or hyponatremia (4/3/2014); Hypothyroidism (5/30/2014); Leg edema; Neurogenic bladder (8/14); Obesity (4/3/2014);  Sleep apnea (5/10/2014); Unspecified adverse effect of anesthesia; Vitamin D deficiency (4/3/2014); and Xerostomia (4/3/2014). Ms. Kevon Cherry  has a past surgical history that includes hx appendectomy; hx carpal tunnel release (Right); hx hysterectomy (1992); hx oophorectomy; hx tubal ligation; hx heent; and hx orthopaedic. Social History/Living Environment:   Home Environment: Private residence  Living Alone: No  Support Systems: Family member(s), Friends \ neighbors  Prior Level of Function/Work/Activity:  Independent  Dominant Side:         RIGHT  Personal Factors:          Sex:  female        Age:  59 y.o. Current Medications:       Current Outpatient Prescriptions:     umeclidinium (INCRUSE ELLIPTA) 62.5 mcg/actuation inhaler, Take 1 Puff by inhalation daily. , Disp: 1 Inhaler, Rfl: 5    raloxifene (EVISTA) 60 mg tablet, Take 1 Tab by mouth daily. Indications: osteopenia, Disp: 30 Tab, Rfl: 6    diclofenac EC (VOLTAREN) 75 mg EC tablet, Take 75 mg by mouth., Disp: , Rfl:     DISABLED PLACARD (DISABLED PLACARD) DMV, Supply prescription to [facility] with completed form [id number]., Disp: , Rfl:     buPROPion XL (WELLBUTRIN XL) 300 mg XL tablet, , Disp: , Rfl: 1    buPROPion XL (WELLBUTRIN XL) 150 mg tablet, , Disp: , Rfl: 1    SYNTHROID 137 mcg tablet, take 1 tablet by mouth once daily BEFORE BREAKFAST, Disp: 30 Tab, Rfl: 1    clonazePAM (KLONOPIN) 0.5 mg tablet, Take  by mouth three (3) times daily. , Disp: , Rfl:     cholecalciferol, VITAMIN D3, (VITAMIN D3) 5,000 unit tab tablet, Take  by mouth daily. , Disp: , Rfl:     cpap machine kit, by Does Not Apply route., Disp: , Rfl:     trimethoprim-sulfamethoxazole (BACTRIM DS, SEPTRA DS) 160-800 mg per tablet, take 1 tablet by mouth twice a day, Disp: , Rfl: 0    ARIPiprazole (ABILIFY) 15 mg tablet, , Disp: , Rfl: 0    liothyronine (CYTOMEL) 5 mcg tablet, Take 1 Tab by mouth daily. , Disp: 30 Tab, Rfl: 12    fluticasone-salmeterol (ADVAIR DISKUS) 100-50 mcg/dose diskus inhaler, Take 1 Puff by inhalation every twelve (12) hours. , Disp: 1 Inhaler, Rfl: 12    albuterol (PROVENTIL HFA, VENTOLIN HFA, PROAIR HFA) 90 mcg/actuation inhaler, Take 2 Puffs by inhalation every four (4) hours as needed for Wheezing., Disp: 1 Inhaler, Rfl: 12    omeprazole (PRILOSEC) 40 mg capsule, Take 1 Cap by mouth daily. Indications: Barretts esophagus, Disp: 30 Cap, Rfl: 12    DISABLED PLACARD (DISABLED PLACARD) DMV, Supply prescription to [facility] with completed form [id number]., Disp: , Rfl:     acetaminophen (TYLENOL) 650 mg CR tablet, Take 1 Tab by mouth., Disp: , Rfl:     AMITIZA 24 mcg capsule, Take 24 mcg by mouth., Disp: , Rfl: 0    lidocaine (XYLOCAINE) 2 % solution, , Disp: , Rfl:     vitamin c-vitamin e cap, Take  by mouth., Disp: , Rfl:     guaiFENesin ER (MUCINEX) 600 mg ER tablet, Take 600 mg by mouth two (2) times a day., Disp: , Rfl:     cyanocobalamin (VITAMIN B-12) 1,000 mcg tablet, Take 1,000 mcg by mouth daily. , Disp: , Rfl:     omega-3 fatty acids-vitamin e (FISH OIL) 1,000 mg cap, Take 1 capsule by mouth daily. Last dose 12/5/14, Disp: , Rfl:     cholecalciferol, vitamin D3, (VITAMIN D3) 2,000 unit tab, Take 1 capsule by mouth every evening. Last dose 12/5, Disp: , Rfl:     multivitamins-minerals-lutein (CENTRUM SILVER) tab tablet, Take 1 tablet by mouth daily.  Last dose 12/5, Disp: , Rfl:    Date Last Reviewed:  8/7/2018    Number of Personal Factors/Comorbidities that affect the Plan of Care: 1-2: MODERATE COMPLEXITY   EXAMINATION:   Observation/Orthostatic Postural Assessment:          Posture Assessment: Rounded shoulders, Forward head   Functional Mobility:         Gait/Ambulation:     Base of Support: Center of gravity altered  Speed/Dara: Pace decreased (<100 feet/min)  Step Length: Left shortened, Right shortened  Swing Pattern: Left asymmetrical, Right asymmetrical  Stance: Left increased, Right increased  Gait Abnormalities: Antalgic  Ambulation - Level of Assistance: Independent  · Interventions: Verbal cues, Safety awareness training          Transfers:     Sit to Stand: Modified independent  Stand to Sit: Modified independent  Stand Pivot Transfers: Modified independent  Bed to Chair: Modified independent  Lateral Transfers: Modified independent         Bed Mobility:     Rolling: Independent  Supine to Sit: Independent  Sit to Supine: Independent  Scooting: Independent      Strength:          UE STRENGTH: 4/5 shoulder flexion, 4/5 shoulder abduction, 4/5 shoulder extension, 4/5 elbow flexion, 4/5 elbow extension. LE STRENGTH:  4/5 hip flexion, 4/5 hip abduction, 4/5 hip adduction, 4/5 hip extension, 4/5 knee extension, 4/5 knee flexion, 2/5 ankle dorsiflexion, 2/5 ankle plantarflexion, 2/5 ankle inversion, 2/5 ankle eversion. Sensation:         Intact  Postural Control & Balance:  · Pena Balance Scale:  46/56.   (A score less than 45/56 indicates high risk of falls)     · Dynamic Gait Index:  15/24.   (A score less than or equal to19/24 is abnormal and predictive of falls)        Body Structures Involved:  1. Nerves  2. Joints  3. Muscles Body Functions Affected:  1. Neuromusculoskeletal  2. Movement Related Activities and Participation Affected:  1. Mobility  2. Self Care   Number of elements (examined above) that affect the Plan of Care: 4+: HIGH COMPLEXITY   CLINICAL PRESENTATION:   Presentation: Evolving clinical presentation with changing clinical characteristics: MODERATE COMPLEXITY   CLINICAL DECISION MAKING:   Outcome Measure: Tool Used: Pena Balance Scale  Score:  Initial: 46/56 Most Recent: X/56 (Date: -- )   Interpretation of Score: Each section is scored on a 0-4 scale, 0 representing the patients inability to perform the task and 4 representing independence. The scores of each section are added together for a total score of 56. The higher the patients score, the more independent the patient is.   Any score below 45 indicates increased risk for falls.    Score 56 55-45 44-34 33-23 22-12 11-1 0   Modifier CH CI CJ CK CL CM CN     Tool Used: Dynamic Gait Index  Score:  Initial: 15/24 Most Recent: X/24 (Date: -- )   Interpretation of Score: Each section is scored on a 0-3 scale, 0 representing the patients inability to perform the task and 3 representing independence. The scores of each section are added together for a total score of 24. Any score below 19 indicates increased risk for falls. Score 24 23-19 18-15 14-10 9-5 4-1 0   Modifier CH CI CJ CK CL CM CN     ? Mobility - Walking and Moving Around:     - CURRENT STATUS: CJ - 20%-39% impaired, limited or restricted    - GOAL STATUS: CI - 1%-19% impaired, limited or restricted    - D/C STATUS:  ---------------To be determined---------------    Medical Necessity:   · Patient is expected to demonstrate progress in strength, range of motion, balance and coordination to improve safety during daily activities. · Patient demonstrates good rehab potential due to higher previous functional level. · Skilled intervention continues to be required due to decreased mobility. Reason for Services/Other Comments:  · Patient continues to demonstrate capacity to improve overall mobility which will increase independence and increase safety. Use of outcome tool(s) and clinical judgement create a POC that gives a: Questionable prediction of patient's progress: MODERATE COMPLEXITY            TREATMENT:   (In addition to Assessment/Re-Assessment sessions the following treatments were rendered)  Pre-treatment Symptoms/Complaints:  8/7/2018: Doing ok, how are you? Pain: Initial:    Post Session:  0/10     THERAPEUTIC EXERCISE: (15 minutes):  Exercises per grid below to improve mobility, strength and balance. Required minimal verbal cues to promote proper body alignment and promote proper body mechanics. Progressed resistance, repetitions and complexity of movement as indicated.   NEUROMUSCULAR RE-EDUCATION: (30 minutes):  Exercise/activities per grid below to improve balance, coordination, kinesthetic sense, posture and proprioception. Required minimal visual, verbal and manual cues to promote static and dynamic balance in standing, promote coordination of bilateral, lower extremity(s) and promote motor control of bilateral, lower extremity(s). Date:  8/7/2018   Activity/Exercise Parameters   Walking with head turns: right/left 4 laps   Walking with head nods: up/down 4 laps   Marching in hallway 4 laps   Walking backward in hallway 4 laps   Semitandem walking in hallway 4 laps   Sidestepping in hallway 4 laps   Tiltboard: right/left Static: eyes open  Dynamic: eyes open   Tiltboard: forward/backward Static: eyes open  Dynamic: eyes closed   CLAM Shells on side Red x 20 reps each side   Standing LE strengthening bilaterally #2 x 15 reps bilaterally each exercise   Nu-Step 5 min Level 5.0       Step overs: 1/2 foam roll 10 reps each  [facility] Portal  Treatment/Session Assessment:    · Response to Treatment:  A few loss of balance with looking side ways and walking but otherwise patient did well today, added a few strengthening exercises on for LE's. .  · Compliance with Program/Exercises: Will assess as treatment progresses. · Recommendations/Intent for next treatment session: \"Next visit will focus on advancements to more challenging activities\".   Total Treatment Duration:  PT Patient Time In/Time Out  Time In: 0815  Time Out: 0900    Dale Elliott, JONATHAN

## 2018-08-09 ENCOUNTER — HOSPITAL ENCOUNTER (OUTPATIENT)
Dept: PHYSICAL THERAPY | Age: 58
Discharge: HOME | End: 2018-08-09
Attending: FAMILY MEDICINE
Payer: COMMERCIAL

## 2018-08-09 PROCEDURE — 97110 THERAPEUTIC EXERCISES: CPT

## 2018-08-09 PROCEDURE — 97112 NEUROMUSCULAR REEDUCATION: CPT

## 2018-08-09 NOTE — PROGRESS NOTES
Rosette Philly  : 1960  Primary: Valentino [facility]*  Secondary: [address] [facility]  [facility] [address], [address] [facility]  [phone]   [phone]          OUTPATIENT PHYSICAL THERAPY:Daily Note 2018   ICD-10: Treatment Diagnosis:   · Other abnormalities of gait and mobility (R26.89)  · Difficulty in walking, not elsewhere classified (R26.2)  Precautions/Allergies:   Oxycodone   Fall Risk Score: 0 (? 5 = High Risk)  MD Orders: Evaluate and Treat MEDICAL/REFERRING DIAGNOSIS:  Other abnormalities of gait and mobility [R26.89]   DATE OF ONSET: Chronic  REFERRING PHYSICIAN: Aneta Ch, *  RETURN PHYSICIAN APPOINTMENT: TBD     ASSESSMENT:  [facility] mobility, decreased strength, and decreased endurance secondary to degenerative changes. Patient has attended a total of 4 scheduled physical therapy visits including initial evaluation on 2018. Treatment has consisted of balance and strengthening activities to improve overall mobility and performance with activities of daily living. PROBLEM LIST (Impacting functional limitations):  1. Decreased Strength  2. Decreased ADL/Functional Activities  3. Decreased Ambulation Ability/Technique  4. Decreased Balance  5. Decreased Activity Tolerance INTERVENTIONS PLANNED:  1. Balance Exercise  2. Cold  3. Home Exercise Program (HEP)  4. Neuromuscular Re-education/Strengthening  5. Therapeutic Activites  6. Therapeutic Exercise/Strengthening   TREATMENT PLAN:  Effective Dates: 2018 TO 10/25/2018 (90 days). Frequency/Duration: 2 times a week for 90 Days  GOALS: (Goals have been discussed and agreed upon with patient.)  Short-Term Functional Goals: Time Frame: 30 days  1. Patient will be independent with home exercise program without exacerbation of symptoms or cueing needed. Discharge Goals: Time Frame: 90 days  1.  Patient will be independent with all ADLs with minimal fear of falling and loss of balance with daily tasks. 2. Patient will report no fear avoidance with social or recreational activities due to fear of falling. 3. Patient will score greater than or equal to 48/56 on Pena Balance Scale with minimal effect of imbalance on patient's ability to manage every day life activities. 4. Patient will score greater than or equal to 19/24 on Dynamic Gait Index with minimal effect of imbalance on patient's ability to manage every day life activities. Rehabilitation Potential For Stated Goals: Good            The information in this section was collected on 7/31/2018 (except where otherwise noted). HISTORY:   History of Present Injury/Illness (Reason for Referral):  Patient reports she began to have problems with her balance after her last foot surgery. She report she had surgery on the medial part of her left foot to remove arthritis and insert pins/screws. 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 She reports she was in a wheelchair for on/off a year. She reports she was receiving IV antibiotics for 6 months. She reports that she does not walk with any assistive device. She reports she does wobble when she walks and is afraid of falling. She reports she would like to improve her overall balance and mobility. Past Medical History/Comorbidities:   Ms. Darshan Griffin  has a past medical history of Allergic rhinitis (4/3/2014); Anxiety (5/30/2014); Asthma (5/30/2014); Circadian rhythm disorder; COPD (chronic obstructive pulmonary disease) ([facility] [address] (4/3/2014); DDD (degenerative disc disease) (4/3/2014); Depression, major, recurrent ([facility] [address] (4/3/2014); GERD (gastroesophageal reflux disease) (4/3/2014); HLD (hyperlipidemia) (4/3/2014); Hyposmolality and/or hyponatremia (4/3/2014); Hypothyroidism (5/30/2014); Leg edema; Neurogenic bladder (8/14); Obesity (4/3/2014);  Sleep apnea (5/10/2014); Unspecified adverse effect of anesthesia; Vitamin D deficiency (4/3/2014); and Xerostomia (4/3/2014). Ms. Nicola Ko  has a past surgical history that includes hx appendectomy; hx carpal tunnel release (Right); hx hysterectomy (1992); hx oophorectomy; hx tubal ligation; hx heent; and hx orthopaedic. Social History/Living Environment:   Home Environment: Private residence  Living Alone: No  Support Systems: Family member(s), Friends \ neighbors  Prior Level of Function/Work/Activity:  Independent  Dominant Side:         RIGHT  Personal Factors:          Sex:  female        Age:  59 y.o. Current Medications:       Current Outpatient Prescriptions:     umeclidinium (INCRUSE ELLIPTA) 62.5 mcg/actuation inhaler, Take 1 Puff by inhalation daily. , Disp: 1 Inhaler, Rfl: 5    raloxifene (EVISTA) 60 mg tablet, Take 1 Tab by mouth daily. Indications: osteopenia, Disp: 30 Tab, Rfl: 6    diclofenac EC (VOLTAREN) 75 mg EC tablet, Take 75 mg by mouth., Disp: , Rfl:     DISABLED PLACARD (DISABLED PLACARD) DMV, Supply prescription to [facility] with completed form [id number]., Disp: , Rfl:     buPROPion XL (WELLBUTRIN XL) 300 mg XL tablet, , Disp: , Rfl: 1    buPROPion XL (WELLBUTRIN XL) 150 mg tablet, , Disp: , Rfl: 1    SYNTHROID 137 mcg tablet, take 1 tablet by mouth once daily BEFORE BREAKFAST, Disp: 30 Tab, Rfl: 1    clonazePAM (KLONOPIN) 0.5 mg tablet, Take  by mouth three (3) times daily. , Disp: , Rfl:     cholecalciferol, VITAMIN D3, (VITAMIN D3) 5,000 unit tab tablet, Take  by mouth daily. , Disp: , Rfl:     cpap machine kit, by Does Not Apply route., Disp: , Rfl:     trimethoprim-sulfamethoxazole (BACTRIM DS, SEPTRA DS) 160-800 mg per tablet, take 1 tablet by mouth twice a day, Disp: , Rfl: 0    ARIPiprazole (ABILIFY) 15 mg tablet, , Disp: , Rfl: 0    liothyronine (CYTOMEL) 5 mcg tablet, Take 1 Tab by mouth daily. , Disp: 30 Tab, Rfl: 12    fluticasone-salmeterol (ADVAIR DISKUS) 100-50 mcg/dose diskus inhaler, Take 1 Puff by inhalation every twelve (12) hours. , Disp: 1 Inhaler, Rfl: 12    albuterol (PROVENTIL HFA, VENTOLIN HFA, PROAIR HFA) 90 mcg/actuation inhaler, Take 2 Puffs by inhalation every four (4) hours as needed for Wheezing., Disp: 1 Inhaler, Rfl: 12    omeprazole (PRILOSEC) 40 mg capsule, Take 1 Cap by mouth daily. Indications: Barretts esophagus, Disp: 30 Cap, Rfl: 12    DISABLED PLACARD (DISABLED PLACARD) DMV, Supply prescription to [facility] with completed form [id number]., Disp: , Rfl:     acetaminophen (TYLENOL) 650 mg CR tablet, Take 1 Tab by mouth., Disp: , Rfl:     AMITIZA 24 mcg capsule, Take 24 mcg by mouth., Disp: , Rfl: 0    lidocaine (XYLOCAINE) 2 % solution, , Disp: , Rfl:     vitamin c-vitamin e cap, Take  by mouth., Disp: , Rfl:     guaiFENesin ER (MUCINEX) 600 mg ER tablet, Take 600 mg by mouth two (2) times a day., Disp: , Rfl:     cyanocobalamin (VITAMIN B-12) 1,000 mcg tablet, Take 1,000 mcg by mouth daily. , Disp: , Rfl:     omega-3 fatty acids-vitamin e (FISH OIL) 1,000 mg cap, Take 1 capsule by mouth daily. Last dose 12/5/14, Disp: , Rfl:     cholecalciferol, vitamin D3, (VITAMIN D3) 2,000 unit tab, Take 1 capsule by mouth every evening. Last dose 12/5, Disp: , Rfl:     multivitamins-minerals-lutein (CENTRUM SILVER) tab tablet, Take 1 tablet by mouth daily.  Last dose 12/5, Disp: , Rfl:    Date Last Reviewed:  8/9/2018    Number of Personal Factors/Comorbidities that affect the Plan of Care: 1-2: MODERATE COMPLEXITY   EXAMINATION:   Observation/Orthostatic Postural Assessment:          Posture Assessment: Rounded shoulders, Forward head   Functional Mobility:         Gait/Ambulation:     Base of Support: Center of gravity altered  Speed/Dara: Pace decreased (<100 feet/min)  Step Length: Left shortened, Right shortened  Swing Pattern: Left asymmetrical, Right asymmetrical  Stance: Left increased, Right increased  Gait Abnormalities: Antalgic  Ambulation - Level of Assistance: Independent  · Interventions: Verbal cues, Safety awareness training          Transfers:     Sit to Stand: Modified independent  Stand to Sit: Modified independent  Stand Pivot Transfers: Modified independent  Bed to Chair: Modified independent  Lateral Transfers: Modified independent         Bed Mobility:     Rolling: Independent  Supine to Sit: Independent  Sit to Supine: Independent  Scooting: Independent      Strength:          UE STRENGTH: 4/5 shoulder flexion, 4/5 shoulder abduction, 4/5 shoulder extension, 4/5 elbow flexion, 4/5 elbow extension. LE STRENGTH:  4/5 hip flexion, 4/5 hip abduction, 4/5 hip adduction, 4/5 hip extension, 4/5 knee extension, 4/5 knee flexion, 2/5 ankle dorsiflexion, 2/5 ankle plantarflexion, 2/5 ankle inversion, 2/5 ankle eversion. Sensation:         Intact  Postural Control & Balance:  · Pena Balance Scale:  46/56.   (A score less than 45/56 indicates high risk of falls)     · Dynamic Gait Index:  15/24.   (A score less than or equal to19/24 is abnormal and predictive of falls)        Body Structures Involved:  1. Nerves  2. Joints  3. Muscles Body Functions Affected:  1. Neuromusculoskeletal  2. Movement Related Activities and Participation Affected:  1. Mobility  2. Self Care   Number of elements (examined above) that affect the Plan of Care: 4+: HIGH COMPLEXITY   CLINICAL PRESENTATION:   Presentation: Evolving clinical presentation with changing clinical characteristics: MODERATE COMPLEXITY   CLINICAL DECISION MAKING:   Outcome Measure: Tool Used: Pena Balance Scale  Score:  Initial: 46/56 Most Recent: X/56 (Date: -- )   Interpretation of Score: Each section is scored on a 0-4 scale, 0 representing the patients inability to perform the task and 4 representing independence. The scores of each section are added together for a total score of 56. The higher the patients score, the more independent the patient is.   Any score below 45 indicates increased risk for falls.    Score 56 55-45 44-34 33-23 22-12 11-1 0   Modifier CH CI CJ CK CL CM CN     Tool Used: Dynamic Gait Index  Score:  Initial: 15/24 Most Recent: X/24 (Date: -- )   Interpretation of Score: Each section is scored on a 0-3 scale, 0 representing the patients inability to perform the task and 3 representing independence. The scores of each section are added together for a total score of 24. Any score below 19 indicates increased risk for falls. Score 24 23-19 18-15 14-10 9-5 4-1 0   Modifier CH CI CJ CK CL CM CN     ? Mobility - Walking and Moving Around:     - CURRENT STATUS: CJ - 20%-39% impaired, limited or restricted    - GOAL STATUS: CI - 1%-19% impaired, limited or restricted    - D/C STATUS:  ---------------To be determined---------------    Medical Necessity:   · Patient is expected to demonstrate progress in strength, range of motion, balance and coordination to improve safety during daily activities. · Patient demonstrates good rehab potential due to higher previous functional level. · Skilled intervention continues to be required due to decreased mobility. Reason for Services/Other Comments:  · Patient continues to demonstrate capacity to improve overall mobility which will increase independence and increase safety. Use of outcome tool(s) and clinical judgement create a POC that gives a: Questionable prediction of patient's progress: MODERATE COMPLEXITY            TREATMENT:   (In addition to Assessment/Re-Assessment sessions the following treatments were rendered)  Pre-treatment Symptoms/Complaints:  8/9/2018::It is definetly doing better. Pain: Initial:    Post Session:  0/10     THERAPEUTIC EXERCISE: (15 minutes):  Exercises per grid below to improve mobility, strength and balance. Required minimal verbal cues to promote proper body alignment and promote proper body mechanics. Progressed resistance, repetitions and complexity of movement as indicated.   NEUROMUSCULAR RE-EDUCATION: (30 minutes):  Exercise/activities per grid below to improve balance, coordination, kinesthetic sense, posture and proprioception. Required minimal visual, verbal and manual cues to promote static and dynamic balance in standing, promote coordination of bilateral, lower extremity(s) and promote motor control of bilateral, lower extremity(s). Date:  8/9/2018   Activity/Exercise Parameters   Walking with head turns: right/left 4 laps   Walking with head nods: up/down 4 laps   Marching in hallway 4 laps   Walking backward in hallway 4 laps   Semitandem walking in hallway 4 laps   Sidestepping in hallway 4 laps   Tiltboard: right/left Static: eyes open  Dynamic: eyes open   Tiltboard: forward/backward Static: eyes open  Dynamic: eyes closed   CLAM Shells on side Red x 20 reps each side   Standing LE strengthening bilaterally #2 x 15 reps bilaterally each exercise   Nu-Step 8 min Level 5.0       Step overs: 1/2 foam roll 10 reps each  B       [facility]  Treatment/Session Assessment:    · Response to Treatment:  Patient did well today, one loss of balance with head turns. Progressing well, less dizziness per patient. · Compliance with Program/Exercises: compliant  · Recommendations/Intent for next treatment session: \"Next visit will focus on advancements to more challenging activities\".   Total Treatment Duration:  PT Patient Time In/Time Out  Time In: 1700  Time Out: 0163 [facility], [facility]

## 2018-08-14 ENCOUNTER — HOSPITAL ENCOUNTER (OUTPATIENT)
Dept: PHYSICAL THERAPY | Age: 58
Discharge: HOME | End: 2018-08-14
Attending: FAMILY MEDICINE
Payer: COMMERCIAL

## 2018-08-14 PROCEDURE — 97110 THERAPEUTIC EXERCISES: CPT

## 2018-08-14 PROCEDURE — 97112 NEUROMUSCULAR REEDUCATION: CPT

## 2018-08-14 NOTE — PROGRESS NOTES
Tulio Heads  : 1960  Primary: [facility] [employer]*  Secondary: [address] [place] [facility]  [address] [facility], [address] [place] [address],[address], [facility] [address]  [phone]   [phone]          OUTPATIENT PHYSICAL THERAPY:Daily Note 2018   ICD-10: Treatment Diagnosis:   · Other abnormalities of gait and mobility (R26.89)  · Difficulty in walking, not elsewhere classified (R26.2)  Precautions/Allergies:   Oxycodone   Fall Risk Score: 0 (? 5 = High Risk)  MD Orders: Evaluate and Treat MEDICAL/REFERRING DIAGNOSIS:  Other abnormalities of gait and mobility [R26.89]   DATE OF ONSET: Chronic  REFERRING PHYSICIAN: Jeanna Murphy, *  RETURN PHYSICIAN APPOINTMENT: TBD     ASSESSMENT:  Ms. Jaiden Ervin presents with decreased mobility, decreased strength, and decreased endurance secondary to degenerative changes. Patient has attended a total of 5 scheduled physical therapy visits including initial evaluation on 2018. Treatment has consisted of balance and strengthening activities to improve overall mobility and performance with activities of daily living. PROBLEM LIST (Impacting functional limitations):  1. Decreased Strength  2. Decreased ADL/Functional Activities  3. Decreased Ambulation Ability/Technique  4. Decreased Balance  5. Decreased Activity Tolerance INTERVENTIONS PLANNED:  1. Balance Exercise  2. Cold  3. Home Exercise Program (HEP)  4. Neuromuscular Re-education/Strengthening  5. Therapeutic Activites  6. Therapeutic Exercise/Strengthening   TREATMENT PLAN:  Effective Dates: 2018 TO 10/25/2018 (90 days). Frequency/Duration: 2 times a week for 90 Days  GOALS: (Goals have been discussed and agreed upon with patient.)  Short-Term Functional Goals: Time Frame: 30 days  1. Patient will be independent with home exercise program without exacerbation of symptoms or cueing needed. Discharge Goals: Time Frame: 90 days  1.  Patient will be independent with all ADLs with minimal fear of falling and loss of balance with daily tasks. 2. Patient will report no fear avoidance with social or recreational activities due to fear of falling. 3. Patient will score greater than or equal to 48/56 on Pena Balance Scale with minimal effect of imbalance on patient's ability to manage every day life activities. 4. Patient will score greater than or equal to 19/24 on Dynamic Gait Index with minimal effect of imbalance on patient's ability to manage every day life activities. Rehabilitation Potential For Stated Goals: Good            The information in this section was collected on 7/31/2018 (except where otherwise noted). HISTORY:   History of Present Injury/Illness (Reason for Referral):  Patient reports she began to have problems with her balance after her last foot surgery. She report she had surgery on the medial part of her left foot to remove arthritis and insert pins/screws. 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 She reports she was in a wheelchair for on/off a year. She reports she was receiving IV antibiotics for 6 months. She reports that she does not walk with any assistive device. She reports she does wobble when she walks and is afraid of falling. She reports she would like to improve her overall balance and mobility. Past Medical History/Comorbidities:   Ms. Gisselle Zamudio  has a past medical history of Allergic rhinitis (4/3/2014); Anxiety (5/30/2014); Asthma (5/30/2014); Circadian rhythm disorder; COPD (chronic obstructive pulmonary disease) ([facility] [address] (4/3/2014); DDD (degenerative disc disease) (4/3/2014); Depression, major, recurrent ([facility] [address] (4/3/2014); GERD (gastroesophageal reflux disease) (4/3/2014); HLD (hyperlipidemia) (4/3/2014); Hyposmolality and/or hyponatremia (4/3/2014); Hypothyroidism (5/30/2014); Leg edema; Neurogenic bladder (8/14); Obesity (4/3/2014);  Sleep apnea (5/10/2014); Unspecified adverse effect of anesthesia; Vitamin D deficiency (4/3/2014); and Xerostomia (4/3/2014). Ms. Andria Dodson  has a past surgical history that includes hx appendectomy; hx carpal tunnel release (Right); hx hysterectomy (1992); hx oophorectomy; hx tubal ligation; hx heent; and hx orthopaedic. Social History/Living Environment:   Home Environment: Private residence  Living Alone: No  Support Systems: Family member(s), Friends \ neighbors  Prior Level of Function/Work/Activity:  Independent  Dominant Side:         RIGHT  Personal Factors:          Sex:  female        Age:  59 y.o. Current Medications:       Current Outpatient Prescriptions:     umeclidinium (INCRUSE ELLIPTA) 62.5 mcg/actuation inhaler, Take 1 Puff by inhalation daily. , Disp: 1 Inhaler, Rfl: 5    raloxifene (EVISTA) 60 mg tablet, Take 1 Tab by mouth daily. Indications: osteopenia, Disp: 30 Tab, Rfl: 6    diclofenac EC (VOLTAREN) 75 mg EC tablet, Take 75 mg by mouth., Disp: , Rfl:     DISABLED PLACARD (DISABLED PLACARD) DMV, Supply prescription to [facility] with completed form [id number]., Disp: , Rfl:     buPROPion XL (WELLBUTRIN XL) 300 mg XL tablet, , Disp: , Rfl: 1    buPROPion XL (WELLBUTRIN XL) 150 mg tablet, , Disp: , Rfl: 1    SYNTHROID 137 mcg tablet, take 1 tablet by mouth once daily BEFORE BREAKFAST, Disp: 30 Tab, Rfl: 1    clonazePAM (KLONOPIN) 0.5 mg tablet, Take  by mouth three (3) times daily. , Disp: , Rfl:     cholecalciferol, VITAMIN D3, (VITAMIN D3) 5,000 unit tab tablet, Take  by mouth daily. , Disp: , Rfl:     cpap machine kit, by Does Not Apply route., Disp: , Rfl:     trimethoprim-sulfamethoxazole (BACTRIM DS, SEPTRA DS) 160-800 mg per tablet, take 1 tablet by mouth twice a day, Disp: , Rfl: 0    ARIPiprazole (ABILIFY) 15 mg tablet, , Disp: , Rfl: 0    liothyronine (CYTOMEL) 5 mcg tablet, Take 1 Tab by mouth daily. , Disp: 30 Tab, Rfl: 12    fluticasone-salmeterol (ADVAIR DISKUS) 100-50 mcg/dose diskus inhaler, Take 1 Puff by inhalation every twelve (12) hours. , Disp: 1 Inhaler, Rfl: 12    albuterol (PROVENTIL HFA, VENTOLIN HFA, PROAIR HFA) 90 mcg/actuation inhaler, Take 2 Puffs by inhalation every four (4) hours as needed for Wheezing., Disp: 1 Inhaler, Rfl: 12    omeprazole (PRILOSEC) 40 mg capsule, Take 1 Cap by mouth daily. Indications: Barretts esophagus, Disp: 30 Cap, Rfl: 12    DISABLED PLACARD (DISABLED PLACARD) DMV, Supply prescription to [facility] with completed form [id number]., Disp: , Rfl:     acetaminophen (TYLENOL) 650 mg CR tablet, Take 1 Tab by mouth., Disp: , Rfl:     AMITIZA 24 mcg capsule, Take 24 mcg by mouth., Disp: , Rfl: 0    lidocaine (XYLOCAINE) 2 % solution, , Disp: , Rfl:     vitamin c-vitamin e cap, Take  by mouth., Disp: , Rfl:     guaiFENesin ER (MUCINEX) 600 mg ER tablet, Take 600 mg by mouth two (2) times a day., Disp: , Rfl:     cyanocobalamin (VITAMIN B-12) 1,000 mcg tablet, Take 1,000 mcg by mouth daily. , Disp: , Rfl:     omega-3 fatty acids-vitamin e (FISH OIL) 1,000 mg cap, Take 1 capsule by mouth daily. Last dose 12/5/14, Disp: , Rfl:     cholecalciferol, vitamin D3, (VITAMIN D3) 2,000 unit tab, Take 1 capsule by mouth every evening. Last dose 12/5, Disp: , Rfl:     multivitamins-minerals-lutein (CENTRUM SILVER) tab tablet, Take 1 tablet by mouth daily.  Last dose 12/5, Disp: , Rfl:    Date Last Reviewed:  8/14/2018    Number of Personal Factors/Comorbidities that affect the Plan of Care: 1-2: MODERATE COMPLEXITY   EXAMINATION:   Observation/Orthostatic Postural Assessment:          Posture Assessment: Rounded shoulders, Forward head   Functional Mobility:         Gait/Ambulation:     Base of Support: Center of gravity altered  Speed/Dara: Pace decreased (<100 feet/min)  Step Length: Left shortened, Right shortened  Swing Pattern: Left asymmetrical, Right asymmetrical  Stance: Left increased, Right increased  Gait Abnormalities: Antalgic  Ambulation - Level of Assistance: Independent  · Interventions: Verbal cues, Safety awareness training          Transfers:     Sit to Stand: Modified independent  Stand to Sit: Modified independent  Stand Pivot Transfers: Modified independent  Bed to Chair: Modified independent  Lateral Transfers: Modified independent         Bed Mobility:     Rolling: Independent  Supine to Sit: Independent  Sit to Supine: Independent  Scooting: Independent      Strength:          UE STRENGTH: 4/5 shoulder flexion, 4/5 shoulder abduction, 4/5 shoulder extension, 4/5 elbow flexion, 4/5 elbow extension. LE STRENGTH:  4/5 hip flexion, 4/5 hip abduction, 4/5 hip adduction, 4/5 hip extension, 4/5 knee extension, 4/5 knee flexion, 2/5 ankle dorsiflexion, 2/5 ankle plantarflexion, 2/5 ankle inversion, 2/5 ankle eversion. Sensation:         Intact  Postural Control & Balance:  · Pena Balance Scale:  46/56.   (A score less than 45/56 indicates high risk of falls)     · Dynamic Gait Index:  15/24.   (A score less than or equal to19/24 is abnormal and predictive of falls)        Body Structures Involved:  1. Nerves  2. Joints  3. Muscles Body Functions Affected:  1. Neuromusculoskeletal  2. Movement Related Activities and Participation Affected:  1. Mobility  2. Self Care   Number of elements (examined above) that affect the Plan of Care: 4+: HIGH COMPLEXITY   CLINICAL PRESENTATION:   Presentation: Evolving clinical presentation with changing clinical characteristics: MODERATE COMPLEXITY   CLINICAL DECISION MAKING:   Outcome Measure: Tool Used: Pena Balance Scale  Score:  Initial: 46/56 Most Recent: X/56 (Date: -- )   Interpretation of Score: Each section is scored on a 0-4 scale, 0 representing the patients inability to perform the task and 4 representing independence. The scores of each section are added together for a total score of 56. The higher the patients score, the more independent the patient is.   Any score below 45 indicates increased risk for falls.    Score 56 55-45 44-34 33-23 22-12 11-1 0   Modifier CH CI CJ CK CL CM CN     Tool Used: Dynamic Gait Index  Score:  Initial: 15/24 Most Recent: X/24 (Date: -- )   Interpretation of Score: Each section is scored on a 0-3 scale, 0 representing the patients inability to perform the task and 3 representing independence. The scores of each section are added together for a total score of 24. Any score below 19 indicates increased risk for falls. Score 24 23-19 18-15 14-10 9-5 4-1 0   Modifier CH CI CJ CK CL CM CN     ? Mobility - Walking and Moving Around:     - CURRENT STATUS: CJ - 20%-39% impaired, limited or restricted    - GOAL STATUS: CI - 1%-19% impaired, limited or restricted    - D/C STATUS:  ---------------To be determined---------------    Medical Necessity:   · Patient is expected to demonstrate progress in strength, range of motion, balance and coordination to improve safety during daily activities. · Patient demonstrates good rehab potential due to higher previous functional level. · Skilled intervention continues to be required due to decreased mobility. Reason for Services/Other Comments:  · Patient continues to demonstrate capacity to improve overall mobility which will increase independence and increase safety. Use of outcome tool(s) and clinical judgement create a POC that gives a: Questionable prediction of patient's progress: MODERATE COMPLEXITY            TREATMENT:   (In addition to Assessment/Re-Assessment sessions the following treatments were rendered)  Pre-treatment Symptoms/Complaints:  8/14/2018: Patient reports she is feeling ok today. Pain: Initial: Pain Intensity 1: 0  Post Session:  0/10     NEUROMUSCULAR RE-EDUCATION: (30 minutes):  Exercise/activities per grid below to improve balance, coordination, kinesthetic sense, posture and proprioception.   Required minimal visual, verbal and manual cues to promote static and dynamic balance in standing, promote coordination of bilateral, lower extremity(s) and promote motor control of bilateral, lower extremity(s). Date:  8/14/2018   Activity/Exercise Parameters   Walking with head turns: right/left 4 laps   Walking with head nods: up/down 4 laps   Marching in hallway 4 laps   Walking backward in hallway 4 laps   Semitandem walking in hallway 4 laps   Walking with 360 degree turns in hallway 4 laps   Tiltboard: right/left Static: eyes open with head turns  Dynamic: eyes open   Tiltboard: forward/backward Static: eyes open with head nods  Dynamic: eyes open      THERAPEUTIC EXERCISE: (15 minutes):  Exercises per grid below to improve mobility and strength. Required minimal verbal and manual cues to promote proper body alignment, promote proper body posture and promote proper body mechanics. Progressed resistance, range, repetitions and complexity of movement as indicated. Date:  8/14/2018   Activity/Exercise Parameters   Nu-step 8 minutes  Level 5   Clam shells    Standing hip flexion/march 2 pounds  15 reps  B LE   Standing hip abduction 2 pounds  15 reps  B LE   Standing hip extension 2 pounds  15 reps  B LE   Standing knee flexion 2 pounds  15 reps  B LE      [facility] Portal  Treatment/Session Assessment:    · Response to Treatment:  Patient tolerated treatment well with minimal complaints of increased imbalance after treatment. · Compliance with Program/Exercises: compliant  · Recommendations/Intent for next treatment session: \"Next visit will focus on advancements to more challenging activities\".   Total Treatment Duration:  PT Patient Time In/Time Out  Time In: 1600  Time Out: [address] [place], PT

## 2018-08-16 ENCOUNTER — HOSPITAL ENCOUNTER (OUTPATIENT)
Dept: PHYSICAL THERAPY | Age: 58
Discharge: HOME | End: 2018-08-16
Attending: FAMILY MEDICINE
Payer: COMMERCIAL

## 2018-08-21 ENCOUNTER — HOSPITAL ENCOUNTER (OUTPATIENT)
Dept: PHYSICAL THERAPY | Age: 58
Discharge: HOME | End: 2018-08-21
Attending: FAMILY MEDICINE
Payer: COMMERCIAL

## 2018-08-21 PROCEDURE — 97110 THERAPEUTIC EXERCISES: CPT

## 2018-08-21 PROCEDURE — 97112 NEUROMUSCULAR REEDUCATION: CPT

## 2018-08-21 NOTE — PROGRESS NOTES
Ann Motta  : 1960  Primary: [facility]  Secondary: [address] at [facility]  [address] [facility], [address] [place] [address],[address]  [phone]   [phone]          OUTPATIENT PHYSICAL THERAPY:Daily Note 2018   ICD-10: Treatment Diagnosis:   · Other abnormalities of gait and mobility (R26.89)  · Difficulty in walking, not elsewhere classified (R26.2)  Precautions/Allergies:   Oxycodone   Fall Risk Score: 0 (? 5 = High Risk)  MD Orders: Evaluate and Treat MEDICAL/REFERRING DIAGNOSIS:  Other abnormalities of gait and mobility [R26.89]   DATE OF ONSET: Chronic  REFERRING PHYSICIAN: Aylin Estrada, *  RETURN PHYSICIAN APPOINTMENT: TBD     ASSESSMENT:  Ms. Mariama Chawla presents with decreased mobility, decreased strength, and decreased endurance secondary to degenerative changes. Patient has attended a total of 6 scheduled physical therapy visits including initial evaluation on 2018. Treatment has consisted of balance and strengthening activities to improve overall mobility and performance with activities of daily living. PROBLEM LIST (Impacting functional limitations):  1. Decreased Strength  2. Decreased ADL/Functional Activities  3. Decreased Ambulation Ability/Technique  4. Decreased Balance  5. Decreased Activity Tolerance INTERVENTIONS PLANNED:  1. Balance Exercise  2. Cold  3. Home Exercise Program (HEP)  4. Neuromuscular Re-education/Strengthening  5. Therapeutic Activites  6. Therapeutic Exercise/Strengthening   TREATMENT PLAN:  Effective Dates: 2018 TO 10/25/2018 (90 days). Frequency/Duration: 2 times a week for 90 Days  GOALS: (Goals have been discussed and agreed upon with patient.)  Short-Term Functional Goals: Time Frame: 30 days  1. Patient will be independent with home exercise program without exacerbation of symptoms or cueing needed. Discharge Goals: Time Frame: 90 days  1.  Patient will be independent with all ADLs with minimal fear of falling and loss of balance with daily tasks. 2. Patient will report no fear avoidance with social or recreational activities due to fear of falling. 3. Patient will score greater than or equal to 48/56 on Pena Balance Scale with minimal effect of imbalance on patient's ability to manage every day life activities. 4. Patient will score greater than or equal to 19/24 on Dynamic Gait Index with minimal effect of imbalance on patient's ability to manage every day life activities. Rehabilitation Potential For Stated Goals: Good            The information in this section was collected on 7/31/2018 (except where otherwise noted). HISTORY:   History of Present Injury/Illness (Reason for Referral):  Patient reports she began to have problems with her balance after her last foot surgery. She report she had surgery on the medial part of her left foot to remove arthritis and insert pins/screws. 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 She reports she was in a wheelchair for on/off a year. She reports she was receiving IV antibiotics for 6 months. She reports that she does not walk with any assistive device. She reports she does wobble when she walks and is afraid of falling. She reports she would like to improve her overall balance and mobility. Past Medical History/Comorbidities:   Ms. Andria Dodson  has a past medical history of Allergic rhinitis (4/3/2014); Anxiety (5/30/2014); Asthma (5/30/2014); Circadian rhythm disorder; COPD (chronic obstructive pulmonary disease) ([facility] [address] (4/3/2014); DDD (degenerative disc disease) (4/3/2014); Depression, major, recurrent ([facility] [address] (4/3/2014); GERD (gastroesophageal reflux disease) (4/3/2014); HLD (hyperlipidemia) (4/3/2014); Hyposmolality and/or hyponatremia (4/3/2014); Hypothyroidism (5/30/2014); Leg edema; Neurogenic bladder (8/14); Obesity (4/3/2014);  Sleep apnea (5/10/2014); Unspecified adverse effect of anesthesia; Vitamin D deficiency (4/3/2014); and Xerostomia (4/3/2014). Ms. Liliane Girard  has a past surgical history that includes hx appendectomy; hx carpal tunnel release (Right); hx hysterectomy (1992); hx oophorectomy; hx tubal ligation; hx heent; and hx orthopaedic. Social History/Living Environment:   Home Environment: Private residence  Living Alone: No  Support Systems: Family member(s), Friends \ neighbors  Prior Level of Function/Work/Activity:  Independent  Dominant Side:         RIGHT  Personal Factors:          Sex:  female        Age:  59 y.o. Current Medications:       Current Outpatient Prescriptions:     umeclidinium (INCRUSE ELLIPTA) 62.5 mcg/actuation inhaler, Take 1 Puff by inhalation daily. , Disp: 1 Inhaler, Rfl: 5    raloxifene (EVISTA) 60 mg tablet, Take 1 Tab by mouth daily. Indications: osteopenia, Disp: 30 Tab, Rfl: 6    diclofenac EC (VOLTAREN) 75 mg EC tablet, Take 75 mg by mouth., Disp: , Rfl:     DISABLED PLACARD (DISABLED PLACARD) DMV, Supply prescription to [facility] with completed form [id number]., Disp: , Rfl:     buPROPion XL (WELLBUTRIN XL) 300 mg XL tablet, , Disp: , Rfl: 1    buPROPion XL (WELLBUTRIN XL) 150 mg tablet, , Disp: , Rfl: 1    SYNTHROID 137 mcg tablet, take 1 tablet by mouth once daily BEFORE BREAKFAST, Disp: 30 Tab, Rfl: 1    clonazePAM (KLONOPIN) 0.5 mg tablet, Take  by mouth three (3) times daily. , Disp: , Rfl:     cholecalciferol, VITAMIN D3, (VITAMIN D3) 5,000 unit tab tablet, Take  by mouth daily. , Disp: , Rfl:     cpap machine kit, by Does Not Apply route., Disp: , Rfl:     trimethoprim-sulfamethoxazole (BACTRIM DS, SEPTRA DS) 160-800 mg per tablet, take 1 tablet by mouth twice a day, Disp: , Rfl: 0    ARIPiprazole (ABILIFY) 15 mg tablet, , Disp: , Rfl: 0    liothyronine (CYTOMEL) 5 mcg tablet, Take 1 Tab by mouth daily. , Disp: 30 Tab, Rfl: 12    fluticasone-salmeterol (ADVAIR DISKUS) 100-50 mcg/dose diskus inhaler, Take 1 Puff by inhalation every twelve (12) hours. , Disp: 1 Inhaler, Rfl: 12    albuterol (PROVENTIL HFA, VENTOLIN HFA, PROAIR HFA) 90 mcg/actuation inhaler, Take 2 Puffs by inhalation every four (4) hours as needed for Wheezing., Disp: 1 Inhaler, Rfl: 12    omeprazole (PRILOSEC) 40 mg capsule, Take 1 Cap by mouth daily. Indications: Barretts esophagus, Disp: 30 Cap, Rfl: 12    DISABLED PLACARD (DISABLED PLACARD) DMV, Supply prescription to [facility] with completed form [id number]., Disp: , Rfl:     acetaminophen (TYLENOL) 650 mg CR tablet, Take 1 Tab by mouth., Disp: , Rfl:     AMITIZA 24 mcg capsule, Take 24 mcg by mouth., Disp: , Rfl: 0    lidocaine (XYLOCAINE) 2 % solution, , Disp: , Rfl:     vitamin c-vitamin e cap, Take  by mouth., Disp: , Rfl:     guaiFENesin ER (MUCINEX) 600 mg ER tablet, Take 600 mg by mouth two (2) times a day., Disp: , Rfl:     cyanocobalamin (VITAMIN B-12) 1,000 mcg tablet, Take 1,000 mcg by mouth daily. , Disp: , Rfl:     omega-3 fatty acids-vitamin e (FISH OIL) 1,000 mg cap, Take 1 capsule by mouth daily. Last dose 12/5/14, Disp: , Rfl:     cholecalciferol, vitamin D3, (VITAMIN D3) 2,000 unit tab, Take 1 capsule by mouth every evening. Last dose 12/5, Disp: , Rfl:     multivitamins-minerals-lutein (CENTRUM SILVER) tab tablet, Take 1 tablet by mouth daily.  Last dose 12/5, Disp: , Rfl:    Date Last Reviewed:  8/21/2018    Number of Personal Factors/Comorbidities that affect the Plan of Care: 1-2: MODERATE COMPLEXITY   EXAMINATION:   Observation/Orthostatic Postural Assessment:          Posture Assessment: Rounded shoulders, Forward head   Functional Mobility:         Gait/Ambulation:     Base of Support: Center of gravity altered  Speed/Dara: Pace decreased (<100 feet/min)  Step Length: Left shortened, Right shortened  Swing Pattern: Left asymmetrical, Right asymmetrical  Stance: Left increased, Right increased  Gait Abnormalities: Antalgic  Ambulation - Level of Assistance: Independent  · Interventions: Verbal cues, Safety awareness training          Transfers:     Sit to Stand: Modified independent  Stand to Sit: Modified independent  Stand Pivot Transfers: Modified independent  Bed to Chair: Modified independent  Lateral Transfers: Modified independent         Bed Mobility:     Rolling: Independent  Supine to Sit: Independent  Sit to Supine: Independent  Scooting: Independent      Strength:          UE STRENGTH: 4/5 shoulder flexion, 4/5 shoulder abduction, 4/5 shoulder extension, 4/5 elbow flexion, 4/5 elbow extension. LE STRENGTH:  4/5 hip flexion, 4/5 hip abduction, 4/5 hip adduction, 4/5 hip extension, 4/5 knee extension, 4/5 knee flexion, 2/5 ankle dorsiflexion, 2/5 ankle plantarflexion, 2/5 ankle inversion, 2/5 ankle eversion. Sensation:         Intact  Postural Control & Balance:  · Pena Balance Scale:  46/56.   (A score less than 45/56 indicates high risk of falls)     · Dynamic Gait Index:  15/24.   (A score less than or equal to19/24 is abnormal and predictive of falls)        Body Structures Involved:  1. Nerves  2. Joints  3. Muscles Body Functions Affected:  1. Neuromusculoskeletal  2. Movement Related Activities and Participation Affected:  1. Mobility  2. Self Care   Number of elements (examined above) that affect the Plan of Care: 4+: HIGH COMPLEXITY   CLINICAL PRESENTATION:   Presentation: Evolving clinical presentation with changing clinical characteristics: MODERATE COMPLEXITY   CLINICAL DECISION MAKING:   Outcome Measure: Tool Used: Pena Balance Scale  Score:  Initial: 46/56 Most Recent: X/56 (Date: -- )   Interpretation of Score: Each section is scored on a 0-4 scale, 0 representing the patients inability to perform the task and 4 representing independence. The scores of each section are added together for a total score of 56. The higher the patients score, the more independent the patient is.   Any score below 45 indicates increased risk for falls.    Score 56 55-45 44-34 33-23 22-12 11-1 0   Modifier CH CI CJ CK CL CM CN     Tool Used: Dynamic Gait Index  Score:  Initial: 15/24 Most Recent: X/24 (Date: -- )   Interpretation of Score: Each section is scored on a 0-3 scale, 0 representing the patients inability to perform the task and 3 representing independence. The scores of each section are added together for a total score of 24. Any score below 19 indicates increased risk for falls. Score 24 23-19 18-15 14-10 9-5 4-1 0   Modifier CH CI CJ CK CL CM CN     ? Mobility - Walking and Moving Around:     - CURRENT STATUS: CJ - 20%-39% impaired, limited or restricted    - GOAL STATUS: CI - 1%-19% impaired, limited or restricted    - D/C STATUS:  ---------------To be determined---------------    Medical Necessity:   · Patient is expected to demonstrate progress in strength, range of motion, balance and coordination to improve safety during daily activities. · Patient demonstrates good rehab potential due to higher previous functional level. · Skilled intervention continues to be required due to decreased mobility. Reason for Services/Other Comments:  · Patient continues to demonstrate capacity to improve overall mobility which will increase independence and increase safety. Use of outcome tool(s) and clinical judgement create a POC that gives a: Questionable prediction of patient's progress: MODERATE COMPLEXITY            TREATMENT:   (In addition to Assessment/Re-Assessment sessions the following treatments were rendered)  Pre-treatment Symptoms/Complaints:  8/21/2018: Patient reports she is feeling ok today. Pain: Initial:    Post Session:  0/10     NEUROMUSCULAR RE-EDUCATION: (30 minutes):  Exercise/activities per grid below to improve balance, coordination, kinesthetic sense, posture and proprioception.   Required minimal visual, verbal and manual cues to promote static and dynamic balance in standing, promote coordination of bilateral, lower extremity(s) and promote motor control of bilateral, lower extremity(s). Date:  8/21/2018   Activity/Exercise Parameters   Walking with head turns: right/left 4 laps   Walking with head nods: up/down 4 laps   Marching in hallway 4 laps   Walking backward in hallway 4 laps   Semitandem walking in hallway 4 laps   Walking with 360 degree turns in hallway 4 laps   Tiltboard: right/left Static: eyes open with head turns  Dynamic: eyes open   Tiltboard: forward/backward Static: eyes open with head nods  Dynamic: eyes open      THERAPEUTIC EXERCISE: (15 minutes):  Exercises per grid below to improve mobility and strength. Required minimal verbal and manual cues to promote proper body alignment, promote proper body posture and promote proper body mechanics. Progressed resistance, range, repetitions and complexity of movement as indicated. Date:  8/21/2018   Activity/Exercise Parameters   Nu-step 8 minutes  Level 5   Clam shells    Bridging  Alternating X 10 reps  X 5 reps   Standing hip flexion/march 2 pounds  15 reps  B LE   Standing hip abduction 2 pounds  15 reps  B LE   Standing hip extension 2 pounds  15 reps  B LE   Standing knee flexion 2 pounds  15 reps  B LE      [employer] Portal  Treatment/Session Assessment:    · Response to Treatment:  Patient did well today. No LOB but patient says she felt dizzy with some of the exercises. · Compliance with Program/Exercises: compliant  · Recommendations/Intent for next treatment session: \"Next visit will focus on advancements to more challenging activities\".   Total Treatment Duration:  PT Patient Time In/Time Out  Time In: 8328  Time Out: [address] [place], [place]

## 2018-08-23 ENCOUNTER — HOSPITAL ENCOUNTER (OUTPATIENT)
Dept: PHYSICAL THERAPY | Age: 58
Discharge: HOME | End: 2018-08-23
Attending: FAMILY MEDICINE
Payer: COMMERCIAL

## 2018-08-23 PROCEDURE — G8978 MOBILITY CURRENT STATUS: HCPCS

## 2018-08-23 PROCEDURE — 97112 NEUROMUSCULAR REEDUCATION: CPT

## 2018-08-23 PROCEDURE — 97110 THERAPEUTIC EXERCISES: CPT

## 2018-08-23 PROCEDURE — G8979 MOBILITY GOAL STATUS: HCPCS

## 2018-08-23 NOTE — PROGRESS NOTES
Dougie Montgomery  : 1960  Primary: Valent[facility]*  Secondary: [address] [facility] [place] [id number] [facility], [address] [place] [address],[address], [address]  [phone]   [phone]          OUTPATIENT PHYSICAL THERAPY:Progress Report 2018   ICD-10: Treatment Diagnosis:   · Other abnormalities of gait and mobility (R26.89)  · Difficulty in walking, not elsewhere classified (R26.2)  Precautions/Allergies:   Oxycodone   Fall Risk Score: 0 (? 5 = High Risk)  MD Orders: Evaluate and Treat MEDICAL/REFERRING DIAGNOSIS:  Other abnormalities of gait and mobility [R26.89]   DATE OF ONSET: Chronic  REFERRING PHYSICIAN: Joshua Chavez, *  RETURN PHYSICIAN APPOINTMENT: TBD     ASSESSMENT:  Ms. Shafer Emperor presents with decreased mobility, decreased strength, and decreased endurance secondary to degenerative changes. Patient has attended a total of 7 scheduled physical therapy visits including initial evaluation on 2018. Treatment has consisted of balance and strengthening activities to improve overall mobility and performance with activities of daily living. PROBLEM LIST (Impacting functional limitations):  1. Decreased Strength  2. Decreased ADL/Functional Activities  3. Decreased Ambulation Ability/Technique  4. Decreased Balance  5. Decreased Activity Tolerance INTERVENTIONS PLANNED:  1. Balance Exercise  2. Cold  3. Home Exercise Program (HEP)  4. Neuromuscular Re-education/Strengthening  5. Therapeutic Activites  6. Therapeutic Exercise/Strengthening   TREATMENT PLAN:  Effective Dates: 2018 TO 10/25/2018 (90 days). Frequency/Duration: 2 times a week for 90 Days  GOALS: (Goals have been discussed and agreed upon with patient.)  Short-Term Functional Goals: Time Frame: 30 days  1. Patient will be independent with home exercise program without exacerbation of symptoms or cueing needed--goal met.   Discharge Goals: Time Frame: 90 days  1. Patient will be independent with all ADLs with minimal fear of falling and loss of balance with daily tasks--goal met. 2. Patient will report no fear avoidance with social or recreational activities due to fear of falling--goal met. 3. Patient will score greater than or equal to 48/56 on Pena Balance Scale with minimal effect of imbalance on patient's ability to manage every day life activities--goal ongoing. 4. Patient will score greater than or equal to 19/24 on Dynamic Gait Index with minimal effect of imbalance on patient's ability to manage every day life activities--goal ongoing. Rehabilitation Potential For Stated Goals: Good            The information in this section was collected on 7/31/2018 (except where otherwise noted). HISTORY:   History of Present Injury/Illness (Reason for Referral):  Patient reports she began to have problems with her balance after her last foot surgery. She report she had surgery on the medial part of her left foot to remove arthritis and insert pins/screws. 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 She reports she was in a wheelchair for on/off a year. She reports she was receiving IV antibiotics for 6 months. She reports that she does not walk with any assistive device. She reports she does wobble when she walks and is afraid of falling. She reports she would like to improve her overall balance and mobility. Past Medical History/Comorbidities:   Ms. Tanya Khan  has a past medical history of Allergic rhinitis (4/3/2014); Anxiety (5/30/2014); Asthma (5/30/2014); Circadian rhythm disorder; COPD (chronic obstructive pulmonary disease) ([facility] [address] (4/3/2014); DDD (degenerative disc disease) (4/3/2014); Depression, major, recurrent ([facility] [address] (4/3/2014); GERD (gastroesophageal reflux disease) (4/3/2014); HLD (hyperlipidemia) (4/3/2014); Hyposmolality and/or hyponatremia (4/3/2014);  Hypothyroidism (5/30/2014); Leg edema; Neurogenic bladder (8/14); Obesity (4/3/2014); Sleep apnea (5/10/2014); Unspecified adverse effect of anesthesia; Vitamin D deficiency (4/3/2014); and Xerostomia (4/3/2014). Ms. Tanya Khan  has a past surgical history that includes hx appendectomy; hx carpal tunnel release (Right); hx hysterectomy (1992); hx oophorectomy; hx tubal ligation; hx heent; and hx orthopaedic. Social History/Living Environment:   Home Environment: Private residence  Living Alone: No  Support Systems: Family member(s), Friends \ neighbors  Prior Level of Function/Work/Activity:  Independent  Dominant Side:         RIGHT  Personal Factors:          Sex:  female        Age:  59 y.o. Current Medications:       Current Outpatient Prescriptions:     umeclidinium (INCRUSE ELLIPTA) 62.5 mcg/actuation inhaler, Take 1 Puff by inhalation daily. , Disp: 1 Inhaler, Rfl: 5    raloxifene (EVISTA) 60 mg tablet, Take 1 Tab by mouth daily. Indications: osteopenia, Disp: 30 Tab, Rfl: 6    diclofenac EC (VOLTAREN) 75 mg EC tablet, Take 75 mg by mouth., Disp: , Rfl:     DISABLED PLACARD (DISABLED PLACARD) DMV, Supply prescription to [facility] with completed form [id number]., Disp: , Rfl:     buPROPion XL (WELLBUTRIN XL) 300 mg XL tablet, , Disp: , Rfl: 1    buPROPion XL (WELLBUTRIN XL) 150 mg tablet, , Disp: , Rfl: 1    SYNTHROID 137 mcg tablet, take 1 tablet by mouth once daily BEFORE BREAKFAST, Disp: 30 Tab, Rfl: 1    clonazePAM (KLONOPIN) 0.5 mg tablet, Take  by mouth three (3) times daily. , Disp: , Rfl:     cholecalciferol, VITAMIN D3, (VITAMIN D3) 5,000 unit tab tablet, Take  by mouth daily. , Disp: , Rfl:     cpap machine kit, by Does Not Apply route., Disp: , Rfl:     trimethoprim-sulfamethoxazole (BACTRIM DS, SEPTRA DS) 160-800 mg per tablet, take 1 tablet by mouth twice a day, Disp: , Rfl: 0    ARIPiprazole (ABILIFY) 15 mg tablet, , Disp: , Rfl: 0    liothyronine (CYTOMEL) 5 mcg tablet, Take 1 Tab by mouth daily. , Disp: 30 Tab, Rfl: 12    fluticasone-salmeterol (ADVAIR DISKUS) 100-50 mcg/dose diskus inhaler, Take 1 Puff by inhalation every twelve (12) hours. , Disp: 1 Inhaler, Rfl: 12    albuterol (PROVENTIL HFA, VENTOLIN HFA, PROAIR HFA) 90 mcg/actuation inhaler, Take 2 Puffs by inhalation every four (4) hours as needed for Wheezing., Disp: 1 Inhaler, Rfl: 12    omeprazole (PRILOSEC) 40 mg capsule, Take 1 Cap by mouth daily. Indications: Barretts esophagus, Disp: 30 Cap, Rfl: 12    DISABLED PLACARD (DISABLED PLACARD) DMV, Supply prescription to [facility] with completed form [id number]., Disp: , Rfl:     acetaminophen (TYLENOL) 650 mg CR tablet, Take 1 Tab by mouth., Disp: , Rfl:     AMITIZA 24 mcg capsule, Take 24 mcg by mouth., Disp: , Rfl: 0    lidocaine (XYLOCAINE) 2 % solution, , Disp: , Rfl:     vitamin c-vitamin e cap, Take  by mouth., Disp: , Rfl:     guaiFENesin ER (MUCINEX) 600 mg ER tablet, Take 600 mg by mouth two (2) times a day., Disp: , Rfl:     cyanocobalamin (VITAMIN B-12) 1,000 mcg tablet, Take 1,000 mcg by mouth daily. , Disp: , Rfl:     omega-3 fatty acids-vitamin e (FISH OIL) 1,000 mg cap, Take 1 capsule by mouth daily. Last dose 12/5/14, Disp: , Rfl:     cholecalciferol, vitamin D3, (VITAMIN D3) 2,000 unit tab, Take 1 capsule by mouth every evening. Last dose 12/5, Disp: , Rfl:     multivitamins-minerals-lutein (CENTRUM SILVER) tab tablet, Take 1 tablet by mouth daily.  Last dose 12/5, Disp: , Rfl:    Date Last Reviewed:  8/23/2018    Number of Personal Factors/Comorbidities that affect the Plan of Care: 1-2: MODERATE COMPLEXITY   EXAMINATION:   Observation/Orthostatic Postural Assessment:          Posture Assessment: Rounded shoulders, Forward head   Functional Mobility:         Gait/Ambulation:     Base of Support: Center of gravity altered  Speed/Dara: Pace decreased (<100 feet/min)  Step Length: Left shortened, Right shortened  Swing Pattern: Left asymmetrical, Right asymmetrical  Stance: Left increased, Right increased  Gait Abnormalities: Antalgic  Ambulation - Level of Assistance: Independent  · Interventions: Verbal cues, Safety awareness training          Transfers:     Sit to Stand: Modified independent  Stand to Sit: Modified independent  Stand Pivot Transfers: Modified independent  Bed to Chair: Modified independent  Lateral Transfers: Modified independent         Bed Mobility:     Rolling: Independent  Supine to Sit: Independent  Sit to Supine: Independent  Scooting: Independent      Strength:          UE STRENGTH: 4/5 shoulder flexion, 4/5 shoulder abduction, 4/5 shoulder extension, 4/5 elbow flexion, 4/5 elbow extension. LE STRENGTH:  4/5 hip flexion, 4/5 hip abduction, 4/5 hip adduction, 4/5 hip extension, 4/5 knee extension, 4/5 knee flexion, 2/5 ankle dorsiflexion, 2/5 ankle plantarflexion, 2/5 ankle inversion, 2/5 ankle eversion. Sensation:         Intact  Postural Control & Balance:  · Pena Balance Scale:  46/56.   (A score less than 45/56 indicates high risk of falls)     · Dynamic Gait Index:  15/24.   (A score less than or equal to19/24 is abnormal and predictive of falls)        Body Structures Involved:  1. Nerves  2. Joints  3. Muscles Body Functions Affected:  1. Neuromusculoskeletal  2. Movement Related Activities and Participation Affected:  1. Mobility  2. Self Care   Number of elements (examined above) that affect the Plan of Care: 4+: HIGH COMPLEXITY   CLINICAL PRESENTATION:   Presentation: Evolving clinical presentation with changing clinical characteristics: MODERATE COMPLEXITY   CLINICAL DECISION MAKING:   Outcome Measure: Tool Used: Pena Balance Scale  Score:  Initial: 46/56 Most Recent: 50/56 (Date: 8/23/2018 )   Interpretation of Score: Each section is scored on a 0-4 scale, 0 representing the patients inability to perform the task and 4 representing independence. The scores of each section are added together for a total score of 56.   The higher the patients score, the more independent the patient is. Any score below 45 indicates increased risk for falls. Score 56 55-45 44-34 33-23 22-12 11-1 0   Modifier CH CI CJ CK CL CM CN     Tool Used: Dynamic Gait Index  Score:  Initial: 15/24 Most Recent: 19/24 (Date: 8/23/2018 )   Interpretation of Score: Each section is scored on a 0-3 scale, 0 representing the patients inability to perform the task and 3 representing independence. The scores of each section are added together for a total score of 24. Any score below 19 indicates increased risk for falls. Score 24 23-19 18-15 14-10 9-5 4-1 0   Modifier CH CI CJ CK CL CM CN     ? Mobility - Walking and Moving Around:     - CURRENT STATUS: CI - 1%-19% impaired, limited or restricted    - GOAL STATUS: CI - 1%-19% impaired, limited or restricted    - D/C STATUS:  ---------------To be determined---------------    Medical Necessity:   · Patient is expected to demonstrate progress in strength, range of motion, balance and coordination to improve safety during daily activities. · Patient demonstrates good rehab potential due to higher previous functional level. · Skilled intervention continues to be required due to decreased mobility. Reason for Services/Other Comments:  · Patient continues to demonstrate capacity to improve overall mobility which will increase independence and increase safety. Use of outcome tool(s) and clinical judgement create a POC that gives a: Questionable prediction of patient's progress: MODERATE COMPLEXITY            TREATMENT:   (In addition to Assessment/Re-Assessment sessions the following treatments were rendered)  Pre-treatment Symptoms/Complaints:  8/23/2018: Patient reports she if still having some pain in her foot, but her balance is better overall she feels.   Pain: Initial: Pain Intensity 1: 0  Post Session:  0/10     NEUROMUSCULAR RE-EDUCATION: (30 minutes):  Exercise/activities per grid below to improve balance, coordination, kinesthetic sense, posture and proprioception. Required minimal visual, verbal and manual cues to promote static and dynamic balance in standing, promote coordination of bilateral, lower extremity(s) and promote motor control of bilateral, lower extremity(s). Date:  8/23/2018   Activity/Exercise Parameters   Walking with head turns: right/left 4 laps   Walking with head nods: up/down 4 laps   Marching in hallway 4 laps   Walking backward in hallway 4 laps   Semitandem walking in hallway 4 laps   Walking with 360 degree turns in hallway 4 laps   Tiltboard: right/left Static: eyes open with head turns  Dynamic: eyes open   Tiltboard: forward/backward Static: eyes open with head nods  Dynamic: eyes open      THERAPEUTIC EXERCISE: (15 minutes):  Exercises per grid below to improve mobility and strength. Required minimal verbal and manual cues to promote proper body alignment, promote proper body posture and promote proper body mechanics. Progressed resistance, range, repetitions and complexity of movement as indicated. Date:  8/23/2018   Activity/Exercise Parameters   Nu-step 8 minutes  Level 5   Clam shells    Bridging  Alternating X 10 reps  X 5 reps   Standing hip flexion/march 2 pounds  15 reps  B LE   Standing hip abduction 2 pounds  15 reps  B LE   Standing hip extension 2 pounds  15 reps  B LE   Standing knee flexion 2 pounds  15 reps  B LE      Med[employer] Portal  Treatment/Session Assessment:    · Response to Treatment:  Patient tolerated treatment well with no rest breaks and no loss of balance. · Compliance with Program/Exercises: compliant  · Recommendations/Intent for next treatment session: \"Next visit will focus on advancements to more challenging activities\".   Total Treatment Duration:  PT Patient Time In/Time Out  Time In: 0945  Time Out: [address] [place], [place]

## 2018-08-25 ENCOUNTER — APPOINTMENT (OUTPATIENT)
Dept: GENERAL RADIOLOGY | Age: 58
End: 2018-08-25
Attending: EMERGENCY MEDICINE
Payer: COMMERCIAL

## 2018-08-25 ENCOUNTER — HOSPITAL ENCOUNTER (EMERGENCY)
Age: 58
Discharge: HOME | End: 2018-08-26
Attending: EMERGENCY MEDICINE
Payer: COMMERCIAL

## 2018-08-25 DIAGNOSIS — R10.13 ABDOMINAL PAIN, EPIGASTRIC: Primary | ICD-10-CM

## 2018-08-25 DIAGNOSIS — K21.9 GASTROESOPHAGEAL REFLUX DISEASE, ESOPHAGITIS PRESENCE NOT SPECIFIED: ICD-10-CM

## 2018-08-25 DIAGNOSIS — E78.5 HYPERLIPIDEMIA, UNSPECIFIED HYPERLIPIDEMIA TYPE: ICD-10-CM

## 2018-08-25 LAB
ALBUMIN SERPL-MCNC: 3.2 G/DL (ref 3.5–5)
ALBUMIN/GLOB SERPL: 0.9 {RATIO} (ref 1.2–3.5)
ALP SERPL-CCNC: 91 U/L (ref 50–136)
ALT SERPL-CCNC: 33 U/L (ref 12–65)
ANION GAP SERPL CALC-SCNC: 9 MMOL/L (ref 7–16)
AST SERPL-CCNC: 18 U/L (ref 15–37)
BILIRUB SERPL-MCNC: 0.3 MG/DL (ref 0.2–1.1)
BUN SERPL-MCNC: 19 MG/DL (ref 6–23)
CALCIUM SERPL-MCNC: 8.7 MG/DL (ref 8.3–10.4)
CHLORIDE SERPL-SCNC: 103 MMOL/L (ref 98–107)
CO2 SERPL-SCNC: 27 MMOL/L (ref 21–32)
CREAT SERPL-MCNC: 1.07 MG/DL (ref 0.6–1)
ERYTHROCYTE [DISTWIDTH] IN BLOOD BY AUTOMATED COUNT: 12.3 %
GLOBULIN SER CALC-MCNC: 3.6 G/DL (ref 2.3–3.5)
GLUCOSE SERPL-MCNC: 109 MG/DL (ref 65–100)
HCT VFR BLD AUTO: 39.4 % (ref 35.8–46.3)
HGB BLD-MCNC: 13.4 G/DL (ref 11.7–15.4)
MCH RBC QN AUTO: 30.2 PG (ref 26.1–32.9)
MCHC RBC AUTO-ENTMCNC: 34 G/DL (ref 31.4–35)
MCV RBC AUTO: 88.9 FL (ref 79.6–97.8)
NRBC # BLD: 0 K/UL (ref 0–0.2)
PLATELET # BLD AUTO: 289 K/UL (ref 150–450)
PMV BLD AUTO: 10.9 FL (ref 9.4–12.3)
POTASSIUM SERPL-SCNC: 4.4 MMOL/L (ref 3.5–5.1)
PROT SERPL-MCNC: 6.8 G/DL (ref 6.3–8.2)
RBC # BLD AUTO: 4.43 M/UL (ref 4.05–5.2)
SODIUM SERPL-SCNC: 139 MMOL/L (ref 136–145)
TROPONIN I SERPL-MCNC: <0.02 NG/ML (ref 0.02–0.05)
WBC # BLD AUTO: 7.4 K/UL (ref 4.3–11.1)

## 2018-08-25 PROCEDURE — 80053 COMPREHEN METABOLIC PANEL: CPT

## 2018-08-25 PROCEDURE — 84484 ASSAY OF TROPONIN QUANT: CPT

## 2018-08-25 PROCEDURE — 93005 ELECTROCARDIOGRAM TRACING: CPT | Performed by: EMERGENCY MEDICINE

## 2018-08-25 PROCEDURE — 71046 X-RAY EXAM CHEST 2 VIEWS: CPT

## 2018-08-25 PROCEDURE — 81003 URINALYSIS AUTO W/O SCOPE: CPT | Performed by: EMERGENCY MEDICINE

## 2018-08-25 PROCEDURE — 85027 COMPLETE CBC AUTOMATED: CPT

## 2018-08-25 PROCEDURE — 74011250637 HC RX REV CODE- 250/637: Performed by: EMERGENCY MEDICINE

## 2018-08-25 PROCEDURE — 74011000250 HC RX REV CODE- 250: Performed by: EMERGENCY MEDICINE

## 2018-08-25 PROCEDURE — 99285 EMERGENCY DEPT VISIT HI MDM: CPT | Performed by: EMERGENCY MEDICINE

## 2018-08-25 PROCEDURE — 81015 MICROSCOPIC EXAM OF URINE: CPT

## 2018-08-25 RX ORDER — LIDOCAINE HYDROCHLORIDE 20 MG/ML
15 SOLUTION OROPHARYNGEAL
Status: COMPLETED | OUTPATIENT
Start: 2018-08-25 | End: 2018-08-25

## 2018-08-25 RX ADMIN — LIDOCAINE HYDROCHLORIDE 15 ML: 20 SOLUTION ORAL; TOPICAL at 22:51

## 2018-08-25 RX ADMIN — Medication 30 ML: at 22:51

## 2018-08-26 VITALS
OXYGEN SATURATION: 96 % | RESPIRATION RATE: 18 BRPM | DIASTOLIC BLOOD PRESSURE: 79 MMHG | BODY MASS INDEX: 35.17 KG/M2 | WEIGHT: 206 LBS | HEART RATE: 64 BPM | SYSTOLIC BLOOD PRESSURE: 171 MMHG | HEIGHT: 64 IN | TEMPERATURE: 97.9 F

## 2018-08-26 LAB
ATRIAL RATE: 76 BPM
BACTERIA URNS QL MICRO: ABNORMAL /HPF
CALCULATED P AXIS, ECG09: 81 DEGREES
CALCULATED R AXIS, ECG10: 66 DEGREES
CALCULATED T AXIS, ECG11: 58 DEGREES
CASTS URNS QL MICRO: 0 /LPF
DIAGNOSIS, 93000: NORMAL
EPI CELLS #/AREA URNS HPF: ABNORMAL /HPF
P-R INTERVAL, ECG05: 170 MS
Q-T INTERVAL, ECG07: 354 MS
QRS DURATION, ECG06: 86 MS
QTC CALCULATION (BEZET), ECG08: 398 MS
RBC #/AREA URNS HPF: 0 /HPF
TROPONIN I BLD-MCNC: 0 NG/ML (ref 0.02–0.05)
VENTRICULAR RATE, ECG03: 76 BPM
WBC URNS QL MICRO: ABNORMAL /HPF

## 2018-08-26 PROCEDURE — 84484 ASSAY OF TROPONIN QUANT: CPT

## 2018-08-26 RX ORDER — SUCRALFATE 1 G/1
1 TABLET ORAL 4 TIMES DAILY
Qty: 60 TAB | Refills: 2 | Status: SHIPPED | OUTPATIENT
Start: 2018-08-26 | End: 2018-09-10

## 2018-08-26 NOTE — ED PROVIDER NOTES
HPI Comments: Patient states she has been having lower chest pain since around 7 PM.  She states it started gradually and got progressively worse. The pain has been intermittent, lasting anywhere from 2-10 minutes. The pain radiates to her back. She has had some mild nausea without any shortness of breath or diaphoresis. She denies similar pain in the past.  The pain gets up to 8/10, is currently 3/10. She denies any aggravating or relieving factors. She has not taken any medicine for her symptoms. Elements of this note were created using speech recognition software. As such, errors of speech recognition may be present. Patient is a 59 y.o. female presenting with chest pain. The history is provided by the patient. Chest Pain    Pertinent negatives include no fever, no nausea and no vomiting.         Past Medical History:   Diagnosis Date    Allergic rhinitis 4/3/2014    Anxiety 5/30/2014    Asthma 5/30/2014    Circadian rhythm disorder     COPD (chronic obstructive pulmonary disease) ([facility] [address] 4/3/2014    DDD (degenerative disc disease) 4/3/2014    Depression, major, recurrent ([address] 4/3/2014    controlled with meds     GERD (gastroesophageal reflux disease) 4/3/2014    controlled with meds     HLD (hyperlipidemia) 4/3/2014    Hyposmolality and/or hyponatremia 4/3/2014    Hypothyroidism 5/30/2014    controlled with medication     Leg edema     Neurogenic bladder 8/14    self cath 4 times daily, sees Dr. Isael Carney Obesity 4/3/2014    Sleep apnea 5/10/2014    CPAP machine at hs     Unspecified adverse effect of anesthesia     difficult to wake up     Vitamin D deficiency 4/3/2014    Xerostomia 4/3/2014       Past Surgical History:   Procedure Laterality Date    HX APPENDECTOMY      HX CARPAL TUNNEL RELEASE Right     HX HEENT      cataract bilat    HX HYSTERECTOMY  1992    only L ovary remaining     HX OOPHORECTOMY      one side only-unsure which side    HX ORTHOPAEDIC      R carpal tunnel release, L foot surg, R knee    HX TUBAL LIGATION           Family History:   Problem Relation Age of Onset    Cancer Mother      breast    Breast Cancer Mother 46    Emphysema Father     Lung Disease Father        Social History     Social History    Marital status:      Spouse name: N/A    Number of children: N/A    Years of education: N/A     Occupational History    Not on file. Social History Main Topics    Smoking status: Former Smoker     Packs/day: 1.00     Years: 30.00     Quit date: 2/19/2011    Smokeless tobacco: Never Used    Alcohol use No    Drug use: No    Sexual activity: Not on file     Other Topics Concern    Not on file     Social History Narrative         ALLERGIES: Oxycodone    Review of Systems   Constitutional: Negative for chills and fever. Cardiovascular: Positive for chest pain. Gastrointestinal: Negative for nausea and vomiting. All other systems reviewed and are negative. Vitals:    08/25/18 2105 08/25/18 2138 08/25/18 2208 08/25/18 2209   BP: (!) 163/93 160/75 190/88 191/89   Pulse: 90  70 70   Resp: 20      Temp: 97.5 °F (36.4 °C)      SpO2: 97%  99% 100%   Weight: 93.4 kg (206 lb)      Height: 5' 4\" (1.626 m)               Physical Exam   Constitutional: She is oriented to person, place, and time. She appears well-developed and well-nourished. HENT:   Head: Normocephalic and atraumatic. Eyes: Conjunctivae are normal. Pupils are equal, round, and reactive to light. Neck: Normal range of motion. Neck supple. Cardiovascular: Normal rate and regular rhythm. Pulmonary/Chest: Effort normal and breath sounds normal.   Abdominal: Soft. Bowel sounds are normal. There is tenderness. Tenderness to palpation epigastrium as indicated   Musculoskeletal: She exhibits no edema or tenderness. Neurological: She is alert and oriented to person, place, and time. Skin: Skin is warm and dry. Psychiatric: She has a normal mood and affect.  Her behavior is normal.   Nursing note and vitals reviewed.        MDM  Number of Diagnoses or Management Options     Amount and/or Complexity of Data Reviewed  Clinical lab tests: ordered  Tests in the radiology section of CPT®: ordered  Tests in the medicine section of CPT®: ordered          ED Course       Procedures

## 2018-08-26 NOTE — ED TRIAGE NOTES
Pt arrived via POV c/o chest tightness that radiates to her whole chest and into her back. Pt also states that she is nauseous and has some dizziness that all started tonight. Pt states that she took zantac and milanta at home prior to arrival. EKG done in triage.

## 2018-08-26 NOTE — DISCHARGE INSTRUCTIONS
Indigestion (Dyspepsia or Heartburn): Care Instructions  Your Care Instructions  Sometimes it can be hard to pinpoint the cause of indigestion. (It is also called dyspepsia or heartburn.) Most cases of an upset stomach with bloating, burning, burping, and nausea are minor and go away within several hours. Home treatment and over-the-counter medicine often are able to control symptoms. But if you take medicine to relieve your indigestion without making diet and lifestyle changes, your symptoms are likely to return again and again. If you get indigestion often, it may be a sign of a more serious medical problem. Be sure to follow up with your doctor, who may want to do tests to be sure of the cause of your indigestion. Follow-up care is a key part of your treatment and safety. Be sure to make and go to all appointments, and call your doctor if you are having problems. It's also a good idea to know your test results and keep a list of the medicines you take. How can you care for yourself at home? · Your doctor may recommend over-the-counter medicine. For mild or occasional indigestion, antacids such as Gaviscon, Mylanta, Maalox, or Tums, may help. Be safe with medicines. Be careful when you take over-the-counter antacid medicines. Many of these medicines have aspirin in them. Read the label to make sure that you are not taking more than the recommended dose. Too much aspirin can be harmful. · Your doctor also may recommend over-the-counter acid reducers, such as Pepcid AC, Tagamet HB, Zantac 75, or Prilosec. Read and follow all instructions on the label. If you use these medicines often, talk with your doctor. · Change your eating habits. ¨ It's best to eat several small meals instead of two or three large meals. ¨ After you eat, wait 2 to 3 hours before you lie down. ¨ Chocolate, mint, and alcohol can make GERD worse.   ¨ Spicy foods, foods that have a lot of acid (like tomatoes and oranges), and coffee can make GERD symptoms worse in some people. If your symptoms are worse after you eat a certain food, you may want to stop eating that food to see if your symptoms get better. · Do not smoke or chew tobacco. Smoking can make GERD worse. If you need help quitting, talk to your doctor about stop-smoking programs and medicines. These can increase your chances of quitting for good. · If you have GERD symptoms at night, raise the head of your bed 6 to 8 inches. You can do this by putting the frame on blocks or placing a foam wedge under the head of your mattress. (Adding extra pillows does not work.)  · Do not wear tight clothing around your middle. · Lose weight if you need to. Losing just 5 to 10 pounds can help. · Do not take anti-inflammatory medicines, such as aspirin, ibuprofen (Advil, Motrin), or naproxen (Aleve). These can irritate the stomach. If you need a pain medicine, try acetaminophen (Tylenol), which does not cause stomach upset. When should you call for help? Call your doctor now or seek immediate medical care if:    · You have new or worse belly pain.     · You are vomiting.    Watch closely for changes in your health, and be sure to contact your doctor if:    · You have new or worse symptoms of indigestion.     · You have trouble or pain swallowing.     · You are losing weight.     · You do not get better as expected. Where can you learn more? Go to http://gerardo-shakira.info/. Enter K078 in the search box to learn more about \"Indigestion (Dyspepsia or Heartburn): Care Instructions. \"  Current as of: May 12, 2017  Content Version: 11.7  © 4875-1359 [employer]wise, [employer]orporated. Care instructions adapted under license by [employer] (which disclaims liability or warranty for this information).  If you have questions about a medical condition or this instruction, always ask your healthcare professional. [address] any warranty or liability for your use of this information.

## 2018-08-26 NOTE — ED NOTES
I have reviewed discharge instructions with the patient. The patient verbalized understanding. Patient left ED via Discharge Method: ambulatory to Home with family    Opportunity for questions and clarification provided. Patient given 1 scripts. To continue your aftercare when you leave the hospital, you may receive an automated call from our care team to check in on how you are doing. This is a free service and part of our promise to provide the best care and service to meet your aftercare needs.  If you have questions, or wish to unsubscribe from this service please call [phone]. Thank you for Choosing our [facility] Emergency Department.

## 2018-08-28 ENCOUNTER — HOSPITAL ENCOUNTER (OUTPATIENT)
Dept: PHYSICAL THERAPY | Age: 58
Discharge: HOME | End: 2018-08-28
Attending: FAMILY MEDICINE
Payer: COMMERCIAL

## 2018-08-28 PROCEDURE — 97110 THERAPEUTIC EXERCISES: CPT

## 2018-08-28 PROCEDURE — 97112 NEUROMUSCULAR REEDUCATION: CPT

## 2018-08-28 NOTE — PROGRESS NOTES
Stephan Balbuena  : 1960  Primary: Valentino [facility]*  Secondary: [address] [place] at [facility]  [address] [facility], [address] [place] [address] [address], [address] [facility]  [phone]   [phone]          OUTPATIENT PHYSICAL THERAPY:Progress Report 2018   ICD-10: Treatment Diagnosis:   · Other abnormalities of gait and mobility (R26.89)  · Difficulty in walking, not elsewhere classified (R26.2)  Precautions/Allergies:   Oxycodone   Fall Risk Score: 0 (? 5 = High Risk)  MD Orders: Evaluate and Treat MEDICAL/REFERRING DIAGNOSIS:  Other abnormalities of gait and mobility [R26.89]   DATE OF ONSET: Chronic  REFERRING PHYSICIAN: Wendi Flood, *  RETURN PHYSICIAN APPOINTMENT: TBD     ASSESSMENT:  Ms. Tee Husbands presents with decreased mobility, decreased strength, and decreased endurance secondary to degenerative changes. Patient has attended a total of 8 scheduled physical therapy visits including initial evaluation on 2018. Treatment has consisted of balance and strengthening activities to improve overall mobility and performance with activities of daily living. PROBLEM LIST (Impacting functional limitations):  1. Decreased Strength  2. Decreased ADL/Functional Activities  3. Decreased Ambulation Ability/Technique  4. Decreased Balance  5. Decreased Activity Tolerance INTERVENTIONS PLANNED:  1. Balance Exercise  2. Cold  3. Home Exercise Program (HEP)  4. Neuromuscular Re-education/Strengthening  5. Therapeutic Activites  6. Therapeutic Exercise/Strengthening   TREATMENT PLAN:  Effective Dates: 2018 TO 10/25/2018 (90 days). Frequency/Duration: 2 times a week for 90 Days  GOALS: (Goals have been discussed and agreed upon with patient.)  Short-Term Functional Goals: Time Frame: 30 days  1. Patient will be independent with home exercise program without exacerbation of symptoms or cueing needed--goal met.   Discharge Goals: Time Frame: 90 days  1. Patient will be independent with all ADLs with minimal fear of falling and loss of balance with daily tasks--goal met. 2. Patient will report no fear avoidance with social or recreational activities due to fear of falling--goal met. 3. Patient will score greater than or equal to 48/56 on Pena Balance Scale with minimal effect of imbalance on patient's ability to manage every day life activities--goal ongoing. 4. Patient will score greater than or equal to 19/24 on Dynamic Gait Index with minimal effect of imbalance on patient's ability to manage every day life activities--goal ongoing. Rehabilitation Potential For Stated Goals: Good            The information in this section was collected on 7/31/2018 (except where otherwise noted). HISTORY:   History of Present Injury/Illness (Reason for Referral):  Patient reports she began to have problems with her balance after her last foot surgery. She report she had surgery on the medial part of her left foot to remove arthritis and insert pins/screws. 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 She reports she was in a wheelchair for on/off a year. She reports she was receiving IV antibiotics for 6 months. She reports that she does not walk with any assistive device. She reports she does wobble when she walks and is afraid of falling. She reports she would like to improve her overall balance and mobility. Past Medical History/Comorbidities:   Ms. Anya Elliott  has a past medical history of Allergic rhinitis (4/3/2014); Anxiety (5/30/2014); Asthma (5/30/2014); Circadian rhythm disorder; COPD (chronic obstructive pulmonary disease) ([facility] [address] (4/3/2014); DDD (degenerative disc disease) (4/3/2014); Depression, major, recurrent ([facility] [address] (4/3/2014); GERD (gastroesophageal reflux disease) (4/3/2014); HLD (hyperlipidemia) (4/3/2014); Hyposmolality and/or hyponatremia (4/3/2014);  Hypothyroidism (5/30/2014); Leg edema; Neurogenic bladder (8/14); Obesity (4/3/2014); Sleep apnea (5/10/2014); Unspecified adverse effect of anesthesia; Vitamin D deficiency (4/3/2014); and Xerostomia (4/3/2014). Ms. Tee Husbands  has a past surgical history that includes hx appendectomy; hx carpal tunnel release (Right); hx hysterectomy (1992); hx oophorectomy; hx tubal ligation; hx heent; and hx orthopaedic. Social History/Living Environment:   Home Environment: Private residence  Living Alone: No  Support Systems: Family member(s), Friends \ neighbors  Prior Level of Function/Work/Activity:  Independent  Dominant Side:         RIGHT  Personal Factors:          Sex:  female        Age:  59 y.o. Current Medications:       Current Outpatient Prescriptions:     sucralfate (CARAFATE) 1 gram tablet, Take 1 Tab by mouth four (4) times daily for 15 days. , Disp: 60 Tab, Rfl: 2    SYNTHROID 137 mcg tablet, take 1 tablet by mouth once daily BEFORE BREAKFAST, Disp: 30 Tab, Rfl: 5    umeclidinium (INCRUSE ELLIPTA) 62.5 mcg/actuation inhaler, Take 1 Puff by inhalation daily. , Disp: 1 Inhaler, Rfl: 5    raloxifene (EVISTA) 60 mg tablet, Take 1 Tab by mouth daily. Indications: osteopenia, Disp: 30 Tab, Rfl: 6    diclofenac EC (VOLTAREN) 75 mg EC tablet, Take 75 mg by mouth., Disp: , Rfl:     DISABLED PLACARD (DISABLED PLACARD) DM, Supply prescription to [facility] with completed form [id number]., Disp: , Rfl:     buPROPion XL (WELLBUTRIN XL) 300 mg XL tablet, , Disp: , Rfl: 1    buPROPion XL (WELLBUTRIN XL) 150 mg tablet, , Disp: , Rfl: 1    clonazePAM (KLONOPIN) 0.5 mg tablet, Take  by mouth three (3) times daily. , Disp: , Rfl:     cholecalciferol, VITAMIN D3, (VITAMIN D3) 5,000 unit tab tablet, Take  by mouth daily. , Disp: , Rfl:     cpap machine kit, by Does Not Apply route., Disp: , Rfl:     trimethoprim-sulfamethoxazole (BACTRIM DS, SEPTRA DS) 160-800 mg per tablet, take 1 tablet by mouth twice a day, Disp: , Rfl: 0    ARIPiprazole (ABILIFY) 15 mg tablet, , Disp: , Rfl: 0    liothyronine (CYTOMEL) 5 mcg tablet, Take 1 Tab by mouth daily. , Disp: 30 Tab, Rfl: 12    fluticasone-salmeterol (ADVAIR DISKUS) 100-50 mcg/dose diskus inhaler, Take 1 Puff by inhalation every twelve (12) hours. , Disp: 1 Inhaler, Rfl: 12    albuterol (PROVENTIL HFA, VENTOLIN HFA, PROAIR HFA) 90 mcg/actuation inhaler, Take 2 Puffs by inhalation every four (4) hours as needed for Wheezing., Disp: 1 Inhaler, Rfl: 12    omeprazole (PRILOSEC) 40 mg capsule, Take 1 Cap by mouth daily. Indications: Barretts esophagus, Disp: 30 Cap, Rfl: 12    DISABLED PLACARD (DISABLED PLACARD) DMV, Supply prescription to [facility] with completed form RG-007A., Disp: , Rfl:     acetaminophen (TYLENOL) 650 mg CR tablet, Take 1 Tab by mouth., Disp: , Rfl:     AMITIZA 24 mcg capsule, Take 24 mcg by mouth., Disp: , Rfl: 0    lidocaine (XYLOCAINE) 2 % solution, , Disp: , Rfl:     vitamin c-vitamin e cap, Take  by mouth., Disp: , Rfl:     guaiFENesin ER (MUCINEX) 600 mg ER tablet, Take 600 mg by mouth two (2) times a day., Disp: , Rfl:     cyanocobalamin (VITAMIN B-12) 1,000 mcg tablet, Take 1,000 mcg by mouth daily. , Disp: , Rfl:     omega-3 fatty acids-vitamin e (FISH OIL) 1,000 mg cap, Take 1 capsule by mouth daily. Last dose 12/5/14, Disp: , Rfl:     cholecalciferol, vitamin D3, (VITAMIN D3) 2,000 unit tab, Take 1 capsule by mouth every evening. Last dose 12/5, Disp: , Rfl:     multivitamins-minerals-lutein (CENTRUM SILVER) tab tablet, Take 1 tablet by mouth daily.  Last dose 12/5, Disp: , Rfl:    Date Last Reviewed:  8/28/2018    Number of Personal Factors/Comorbidities that affect the Plan of Care: 1-2: MODERATE COMPLEXITY   EXAMINATION:   Observation/Orthostatic Postural Assessment:          Posture Assessment: Rounded shoulders, Forward head   Functional Mobility:         Gait/Ambulation:     Base of Support: Center of gravity altered  Speed/Dara: Pace decreased (<100 feet/min)  Step Length: Left shortened, Right shortened  Swing Pattern: Left asymmetrical, Right asymmetrical  Stance: Left increased, Right increased  Gait Abnormalities: Antalgic  Ambulation - Level of Assistance: Independent  · Interventions: Verbal cues, Safety awareness training          Transfers:     Sit to Stand: Modified independent  Stand to Sit: Modified independent  Stand Pivot Transfers: Modified independent  Bed to Chair: Modified independent  Lateral Transfers: Modified independent         Bed Mobility:     Rolling: Independent  Supine to Sit: Independent  Sit to Supine: Independent  Scooting: Independent      Strength:          UE STRENGTH: 4/5 shoulder flexion, 4/5 shoulder abduction, 4/5 shoulder extension, 4/5 elbow flexion, 4/5 elbow extension. LE STRENGTH:  4/5 hip flexion, 4/5 hip abduction, 4/5 hip adduction, 4/5 hip extension, 4/5 knee extension, 4/5 knee flexion, 2/5 ankle dorsiflexion, 2/5 ankle plantarflexion, 2/5 ankle inversion, 2/5 ankle eversion. Sensation:         Intact  Postural Control & Balance:  · Pena Balance Scale:  46/56.   (A score less than 45/56 indicates high risk of falls)     · Dynamic Gait Index:  15/24.   (A score less than or equal to19/24 is abnormal and predictive of falls)        Body Structures Involved:  1. Nerves  2. Joints  3. Muscles Body Functions Affected:  1. Neuromusculoskeletal  2. Movement Related Activities and Participation Affected:  1. Mobility  2. Self Care   Number of elements (examined above) that affect the Plan of Care: 4+: HIGH COMPLEXITY   CLINICAL PRESENTATION:   Presentation: Evolving clinical presentation with changing clinical characteristics: MODERATE COMPLEXITY   CLINICAL DECISION MAKING:   Outcome Measure: Tool Used: Pena Balance Scale  Score:  Initial: 46/56 Most Recent: 50/56 (Date: 8/23/2018 )   Interpretation of Score: Each section is scored on a 0-4 scale, 0 representing the patients inability to perform the task and 4 representing independence.   The scores of each section are added together for a total score of 56. The higher the patients score, the more independent the patient is. Any score below 45 indicates increased risk for falls. Score 56 55-45 44-34 33-23 22-12 11-1 0   Modifier CH CI CJ CK CL CM CN     Tool Used: Dynamic Gait Index  Score:  Initial: 15/24 Most Recent: 19/24 (Date: 8/23/2018 )   Interpretation of Score: Each section is scored on a 0-3 scale, 0 representing the patients inability to perform the task and 3 representing independence. The scores of each section are added together for a total score of 24. Any score below 19 indicates increased risk for falls. Score 24 23-19 18-15 14-10 9-5 4-1 0   Modifier CH CI CJ CK CL CM CN     ? Mobility - Walking and Moving Around:     - CURRENT STATUS: CI - 1%-19% impaired, limited or restricted    - GOAL STATUS: CI - 1%-19% impaired, limited or restricted    - D/C STATUS:  ---------------To be determined---------------    Medical Necessity:   · Patient is expected to demonstrate progress in strength, range of motion, balance and coordination to improve safety during daily activities. · Patient demonstrates good rehab potential due to higher previous functional level. · Skilled intervention continues to be required due to decreased mobility. Reason for Services/Other Comments:  · Patient continues to demonstrate capacity to improve overall mobility which will increase independence and increase safety.    Use of outcome tool(s) and clinical judgement create a POC that gives a: Questionable prediction of patient's progress: MODERATE COMPLEXITY            TREATMENT:   (In addition to Assessment/Re-Assessment sessions the following treatments were rendered)  Pre-treatment Symptoms/Complaints:  8/28/2018: \"I am doing much better, more balanced\"  Pain: Initial:    Post Session:  0/10     NEUROMUSCULAR RE-EDUCATION: (30 minutes):  Exercise/activities per grid below to improve balance, coordination, kinesthetic sense, posture and proprioception. Required minimal visual, verbal and manual cues to promote static and dynamic balance in standing, promote coordination of bilateral, lower extremity(s) and promote motor control of bilateral, lower extremity(s). Date:  8/28/2018   Activity/Exercise Parameters   Walking with head turns: right/left 4 laps   Walking with head nods: up/down 4 laps   Marching in hallway 4 laps   Walking backward in hallway 4 laps   Semitandem walking in hallway 4 laps   Walking with 360 degree turns in hallway 4 laps   Tiltboard: right/left Static: eyes open with head turns  Dynamic: eyes open   Tiltboard: forward/backward Static: eyes open with head nods  Dynamic: eyes open      THERAPEUTIC EXERCISE: (15 minutes):  Exercises per grid below to improve mobility and strength. Required minimal verbal and manual cues to promote proper body alignment, promote proper body posture and promote proper body mechanics. Progressed resistance, range, repetitions and complexity of movement as indicated. Date:  8/28/2018   Activity/Exercise Parameters   Nu-step 8 minutes  Level 5   Clam shells    Bridging  Alternating X 10 reps  X 5 reps   Standing hip flexion/march 2 pounds  15 reps  B LE   Standing hip abduction 2 pounds  15 reps  B LE   Standing hip extension 2 pounds  15 reps  B LE   Standing knee flexion 2 pounds  15 reps  B LE      Med[employer] Portal  Treatment/Session Assessment:    · Response to Treatment:  Patient tolerated treatment well with no rest breaks and no loss of balance. Good improvement with Physical Therapy at this time. · Compliance with Program/Exercises: compliant  · Recommendations/Intent for next treatment session: \"Next visit will focus on advancements to more challenging activities\".   Total Treatment Duration:       Priscilla Chavez, JONATHAN

## 2018-08-30 ENCOUNTER — HOSPITAL ENCOUNTER (OUTPATIENT)
Dept: PHYSICAL THERAPY | Age: 58
Discharge: HOME | End: 2018-08-30
Attending: FAMILY MEDICINE
Payer: COMMERCIAL

## 2018-08-30 PROCEDURE — G8980 MOBILITY D/C STATUS: HCPCS

## 2018-08-30 PROCEDURE — 97112 NEUROMUSCULAR REEDUCATION: CPT

## 2018-08-30 PROCEDURE — G8979 MOBILITY GOAL STATUS: HCPCS

## 2018-08-30 NOTE — THERAPY DISCHARGE
Lissy Hdz  : 1960  Primary: Valentino [facility]*  Secondary: [address] [facility]  [address], [address]  [phone]   [phone]          OUTPATIENT PHYSICAL THERAPY:Discharge Summary 2018    ICD-10: Treatment Diagnosis:   · Other abnormalities of gait and mobility (R26.89)  · Difficulty in walking, not elsewhere classified (R26.2)  Precautions/Allergies:   Oxycodone   Fall Risk Score: 0 (? 5 = High Risk)  MD Orders: Evaluate and Treat MEDICAL/REFERRING DIAGNOSIS:  Other abnormalities of gait and mobility [R26.89]   DATE OF ONSET: Chronic  REFERRING PHYSICIAN: Lucas Saxena, *  RETURN PHYSICIAN APPOINTMENT: TBD     ASSESSMENT:  Ms. Emily Shields presents with improved mobility, strength, and endurance secondary to degenerative changes. Patient has attended a total of 9 scheduled physical therapy visits including initial evaluation on 2018. Treatment has consisted of balance and strengthening activities to improve overall mobility and performance with activities of daily living. Patient will not schedule any additional physical therapy visits secondary to all goals met. Patient will be discharged at this time. We will be happy to re-assess her with a change in her status and a new order from her doctor. Thank you for the opportunity to serve this patient. PROBLEM LIST (Impacting functional limitations):  1. Decreased Strength  2. Decreased ADL/Functional Activities  3. Decreased Ambulation Ability/Technique  4. Decreased Balance  5. Decreased Activity Tolerance INTERVENTIONS PLANNED:  1. Balance Exercise  2. Cold  3. Home Exercise Program (HEP)  4. Neuromuscular Re-education/Strengthening  5. Therapeutic Activites  6. Therapeutic Exercise/Strengthening   TREATMENT PLAN:  Effective Dates: 2018 TO 10/25/2018 (90 days).   Frequency/Duration: 2 times a week for 90 Days  GOALS: (Goals have been discussed and agreed upon with patient.)  Short-Term Functional Goals: Time Frame: 30 days  1. Patient will be independent with home exercise program without exacerbation of symptoms or cueing needed--goal met. Discharge Goals: Time Frame: 90 days  1. Patient will be independent with all ADLs with minimal fear of falling and loss of balance with daily tasks--goal met. 2. Patient will report no fear avoidance with social or recreational activities due to fear of falling--goal met. 3. Patient will score greater than or equal to 48/56 on Pena Balance Scale with minimal effect of imbalance on patient's ability to manage every day life activities--goal met. 4. Patient will score greater than or equal to 19/24 on Dynamic Gait Index with minimal effect of imbalance on patient's ability to manage every day life activities--goal met. The information in this section was collected on 8/30/2018 (except where otherwise noted). EXAMINATION:   Observation/Orthostatic Postural Assessment:          Posture Assessment: Rounded shoulders, Forward head   Functional Mobility:         Gait/Ambulation:     Base of Support: Within normal limits  Speed/Dara: Within normal limits  Step Length: Within normal limits  Swing Pattern: Left symmetrical, Right symmetrical  Stance: Within normal limits  Gait Abnormalities: Within normal limits  Ambulation - Level of Assistance: Independent  · Interventions: Verbal cues, Safety awareness training          Transfers:     Sit to Stand: Independent  Stand to Sit: Independent  Stand Pivot Transfers: Independent  Bed to Chair: Independent  Lateral Transfers: Independent         Bed Mobility:     Rolling: Independent  Supine to Sit: Independent  Sit to Supine: Independent  Scooting: Independent      Strength:          UE STRENGTH: 4/5 shoulder flexion, 4/5 shoulder abduction, 4/5 shoulder extension, 4/5 elbow flexion, 4/5 elbow extension.    LE STRENGTH:  4+/5 hip flexion, 4+/5 hip abduction, 4+/5 hip adduction, 4+/5 hip extension, 4+/5 knee extension, 4+/5 knee flexion, 4+/5 ankle dorsiflexion, 4+/5 ankle plantarflexion, 4+/5 ankle inversion, 4+/5 ankle eversion. Sensation:         Intact  Postural Control & Balance:  · Pena Balance Scale:  56/56.   (A score less than 45/56 indicates high risk of falls)     · Dynamic Gait Index:  24/24.   (A score less than or equal to19/24 is abnormal and predictive of falls)        CLINICAL DECISION MAKING:   Outcome Measure: Tool Used: Pena Balance Scale  Score:  Initial: 46/56 Most Recent: 56/56 (Date: 8/30/2018 )   Interpretation of Score: Each section is scored on a 0-4 scale, 0 representing the patients inability to perform the task and 4 representing independence. The scores of each section are added together for a total score of 56. The higher the patients score, the more independent the patient is. Any score below 45 indicates increased risk for falls. Score 56 55-45 44-34 33-23 22-12 11-1 0   Modifier CH CI CJ CK CL CM CN     Tool Used: Dynamic Gait Index  Score:  Initial: 15/24 Most Recent: 24/24 (Date: 8/30/2018 )   Interpretation of Score: Each section is scored on a 0-3 scale, 0 representing the patients inability to perform the task and 3 representing independence. The scores of each section are added together for a total score of 24. Any score below 19 indicates increased risk for falls. Score 24 23-19 18-15 14-10 9-5 4-1 0   Modifier CH CI CJ CK CL CM CN     ?  Mobility - Walking and Moving Around:     - CURRENT STATUS: CI - 1%-19% impaired, limited or restricted    - GOAL STATUS: CI - 1%-19% impaired, limited or restricted    - D/C STATUS:  CH - 0% impaired, limited or restricted        TREATMENT:   (In addition to Assessment/Re-Assessment sessions the following treatments were rendered)  Pre-treatment Symptoms/Complaints:  8/30/2018: Patient reports she is feeling so much better and is ok for discharge today. Pain: Initial: Pain Intensity 1: 0  Post Session:  0/10     NEUROMUSCULAR RE-EDUCATION: (15 minutes):  Exercise/activities per grid below to improve balance, coordination, kinesthetic sense, posture and proprioception. Required minimal visual, verbal and manual cues to promote static and dynamic balance in standing, promote coordination of bilateral, lower extremity(s) and promote motor control of bilateral, lower extremity(s). Date:  8/30/2018   Activity/Exercise Parameters   Walking with head turns: right/left 4 laps   Walking with head nods: up/down 4 laps   Marching in hallway 4 laps   Walking backward in hallway 4 laps   Tiltboard: right/left Static: eyes open with head turns  Dynamic: eyes open   Tiltboard: forward/backward Static: eyes open with head nods  Dynamic: eyes open      Treatment/Session Assessment:    · Response to Treatment:  Patient tolerated treatment well with no rest breaks and no loss of balance. Discharge today verbalizing and demonstrating understanding of HEP.   Total Treatment Duration:  PT Patient Time In/Time Out  Time In: 0945  Time Out: [address] [place], [place]

## 2019-01-28 PROBLEM — R11.0 NAUSEA WITHOUT VOMITING: Status: ACTIVE | Noted: 2019-01-28

## 2019-01-28 PROBLEM — R63.5 RECENT WEIGHT GAIN: Status: ACTIVE | Noted: 2019-01-28

## 2019-01-28 PROBLEM — R10.13 POSTPRANDIAL EPIGASTRIC PAIN: Status: ACTIVE | Noted: 2019-01-28

## 2019-03-01 ENCOUNTER — HOSPITAL ENCOUNTER (OUTPATIENT)
Dept: MAMMOGRAPHY | Age: 59
Discharge: HOME | End: 2019-03-01
Attending: INTERNAL MEDICINE
Payer: COMMERCIAL

## 2019-03-01 DIAGNOSIS — Z12.39 SCREENING BREAST EXAMINATION: ICD-10-CM

## 2019-03-01 PROCEDURE — 77067 SCR MAMMO BI INCL CAD: CPT

## 2020-03-31 ENCOUNTER — HOSPITAL ENCOUNTER (OUTPATIENT)
Dept: LAB | Age: 60
Discharge: HOME | End: 2020-03-31
Payer: COMMERCIAL

## 2020-03-31 DIAGNOSIS — R91.8 PULMONARY INFILTRATE: ICD-10-CM

## 2020-03-31 LAB
BASOPHILS # BLD: 0 K/UL (ref 0–0.2)
BASOPHILS NFR BLD: 0 % (ref 0–2)
DIFFERENTIAL METHOD BLD: ABNORMAL
EOSINOPHIL # BLD: 0.2 K/UL (ref 0–0.8)
EOSINOPHIL NFR BLD: 2 % (ref 0.5–7.8)
ERYTHROCYTE [DISTWIDTH] IN BLOOD BY AUTOMATED COUNT: 12.7 % (ref 11.9–14.6)
ERYTHROCYTE [SEDIMENTATION RATE] IN BLOOD: 19 MM/HR (ref 0–30)
HCT VFR BLD AUTO: 36 % (ref 35.8–46.3)
HGB BLD-MCNC: 12.8 G/DL (ref 11.7–15.4)
IMM GRANULOCYTES # BLD AUTO: 0.1 K/UL (ref 0–0.5)
IMM GRANULOCYTES NFR BLD AUTO: 1 % (ref 0–5)
LYMPHOCYTES # BLD: 2.1 K/UL (ref 0.5–4.6)
LYMPHOCYTES NFR BLD: 22 % (ref 13–44)
MCH RBC QN AUTO: 31.8 PG (ref 26.1–32.9)
MCHC RBC AUTO-ENTMCNC: 35.6 G/DL (ref 31.4–35)
MCV RBC AUTO: 89.3 FL (ref 79.6–97.8)
MONOCYTES # BLD: 0.6 K/UL (ref 0.1–1.3)
MONOCYTES NFR BLD: 6 % (ref 4–12)
NEUTS SEG # BLD: 6.5 K/UL (ref 1.7–8.2)
NEUTS SEG NFR BLD: 69 % (ref 43–78)
NRBC # BLD: 0 K/UL (ref 0–0.2)
PLATELET # BLD AUTO: 281 K/UL (ref 150–450)
PMV BLD AUTO: 9.6 FL (ref 9.4–12.3)
RBC # BLD AUTO: 4.03 M/UL (ref 4.05–5.2)
WBC # BLD AUTO: 9.4 K/UL (ref 4.3–11.1)

## 2020-03-31 PROCEDURE — 36415 COLL VENOUS BLD VENIPUNCTURE: CPT

## 2020-03-31 PROCEDURE — 85025 COMPLETE CBC W/AUTO DIFF WBC: CPT

## 2020-03-31 PROCEDURE — 85652 RBC SED RATE AUTOMATED: CPT

## 2020-07-17 ENCOUNTER — HOSPITAL ENCOUNTER (OUTPATIENT)
Dept: CT IMAGING | Age: 60
Discharge: HOME | End: 2020-07-17
Attending: NURSE PRACTITIONER
Payer: MEDICARE

## 2020-07-17 DIAGNOSIS — R91.8 PULMONARY INFILTRATE: ICD-10-CM

## 2020-07-17 PROCEDURE — 71250 CT THORAX DX C-: CPT

## 2020-07-20 NOTE — PROGRESS NOTES
Please let patient know that CT doesn't show any worrisome findings, only scarring. We will discuss in detail at upcoming appointment.

## 2020-07-21 NOTE — PROGRESS NOTES
Patient was notified CT do not show any worrisome findings, only scarring. She verbalized understanding. She was encourage to keep her follow up appointment.

## 2020-07-22 ENCOUNTER — HOSPITAL ENCOUNTER (OUTPATIENT)
Dept: MAMMOGRAPHY | Age: 60
Discharge: HOME | End: 2020-07-22
Attending: INTERNAL MEDICINE
Payer: MEDICARE

## 2020-07-22 DIAGNOSIS — Z12.31 VISIT FOR SCREENING MAMMOGRAM: ICD-10-CM

## 2020-07-22 PROCEDURE — 77067 SCR MAMMO BI INCL CAD: CPT

## 2020-08-04 PROBLEM — R11.0 NAUSEA WITHOUT VOMITING: Status: RESOLVED | Noted: 2019-01-28 | Resolved: 2020-08-04

## 2020-08-18 ENCOUNTER — APPOINTMENT (OUTPATIENT)
Dept: GENERAL RADIOLOGY | Age: 60
End: 2020-08-18
Attending: EMERGENCY MEDICINE
Payer: MEDICARE

## 2020-08-18 ENCOUNTER — HOSPITAL ENCOUNTER (EMERGENCY)
Age: 60
Discharge: HOME | End: 2020-08-18
Attending: EMERGENCY MEDICINE
Payer: MEDICARE

## 2020-08-18 VITALS
RESPIRATION RATE: 16 BRPM | BODY MASS INDEX: 39.27 KG/M2 | HEIGHT: 64 IN | DIASTOLIC BLOOD PRESSURE: 82 MMHG | WEIGHT: 230 LBS | OXYGEN SATURATION: 99 % | TEMPERATURE: 98 F | HEART RATE: 80 BPM | SYSTOLIC BLOOD PRESSURE: 164 MMHG

## 2020-08-18 DIAGNOSIS — J44.1 COPD EXACERBATION (HCC): Primary | ICD-10-CM

## 2020-08-18 LAB
ALBUMIN SERPL-MCNC: 3.4 G/DL (ref 3.2–4.6)
ALBUMIN/GLOB SERPL: 1.1 {RATIO} (ref 1.2–3.5)
ALP SERPL-CCNC: 76 U/L (ref 50–136)
ALT SERPL-CCNC: 22 U/L (ref 12–65)
ANION GAP SERPL CALC-SCNC: 4 MMOL/L (ref 7–16)
AST SERPL-CCNC: 7 U/L (ref 15–37)
BASOPHILS # BLD: 0 K/UL (ref 0–0.2)
BASOPHILS NFR BLD: 0 % (ref 0–2)
BILIRUB SERPL-MCNC: 0.4 MG/DL (ref 0.2–1.1)
BUN SERPL-MCNC: 18 MG/DL (ref 8–23)
CALCIUM SERPL-MCNC: 9 MG/DL (ref 8.3–10.4)
CHLORIDE SERPL-SCNC: 101 MMOL/L (ref 98–107)
CO2 SERPL-SCNC: 31 MMOL/L (ref 21–32)
CREAT SERPL-MCNC: 1 MG/DL (ref 0.6–1)
DIFFERENTIAL METHOD BLD: ABNORMAL
EOSINOPHIL # BLD: 0.2 K/UL (ref 0–0.8)
EOSINOPHIL NFR BLD: 1 % (ref 0.5–7.8)
ERYTHROCYTE [DISTWIDTH] IN BLOOD BY AUTOMATED COUNT: 16.3 % (ref 11.9–14.6)
GLOBULIN SER CALC-MCNC: 3.2 G/DL (ref 2.3–3.5)
GLUCOSE SERPL-MCNC: 81 MG/DL (ref 65–100)
HCT VFR BLD AUTO: 30.2 % (ref 35.8–46.3)
HGB BLD-MCNC: 12.9 G/DL (ref 11.7–15.4)
IMM GRANULOCYTES # BLD AUTO: 0.5 K/UL (ref 0–0.5)
IMM GRANULOCYTES NFR BLD AUTO: 3 % (ref 0–5)
LYMPHOCYTES # BLD: 2.9 K/UL (ref 0.5–4.6)
LYMPHOCYTES NFR BLD: 18 % (ref 13–44)
MCH RBC QN AUTO: 39.6 PG (ref 26.1–32.9)
MCHC RBC AUTO-ENTMCNC: 42.7 G/DL (ref 31.4–35)
MCV RBC AUTO: 92.6 FL (ref 79.6–97.8)
MONOCYTES # BLD: 1.1 K/UL (ref 0.1–1.3)
MONOCYTES NFR BLD: 7 % (ref 4–12)
NEUTS SEG # BLD: 11.3 K/UL (ref 1.7–8.2)
NEUTS SEG NFR BLD: 71 % (ref 43–78)
NRBC # BLD: 0 K/UL (ref 0–0.2)
PLATELET # BLD AUTO: 284 K/UL (ref 150–450)
PMV BLD AUTO: 9.9 FL (ref 9.4–12.3)
POTASSIUM SERPL-SCNC: 4.2 MMOL/L (ref 3.5–5.1)
PROT SERPL-MCNC: 6.6 G/DL (ref 6.3–8.2)
RBC # BLD AUTO: 3.26 M/UL (ref 4.05–5.2)
SODIUM SERPL-SCNC: 136 MMOL/L (ref 136–145)
WBC # BLD AUTO: 16 K/UL (ref 4.3–11.1)

## 2020-08-18 PROCEDURE — 85025 COMPLETE CBC W/AUTO DIFF WBC: CPT

## 2020-08-18 PROCEDURE — 96374 THER/PROPH/DIAG INJ IV PUSH: CPT

## 2020-08-18 PROCEDURE — 71045 X-RAY EXAM CHEST 1 VIEW: CPT

## 2020-08-18 PROCEDURE — 80053 COMPREHEN METABOLIC PANEL: CPT

## 2020-08-18 PROCEDURE — 99283 EMERGENCY DEPT VISIT LOW MDM: CPT

## 2020-08-18 PROCEDURE — 74011250636 HC RX REV CODE- 250/636: Performed by: EMERGENCY MEDICINE

## 2020-08-18 RX ORDER — PREDNISONE 20 MG/1
20 TABLET ORAL DAILY
Qty: 7 TAB | Refills: 0 | Status: SHIPPED | OUTPATIENT
Start: 2020-08-18 | End: 2020-08-25

## 2020-08-18 RX ADMIN — METHYLPREDNISOLONE SODIUM SUCCINATE 125 MG: 125 INJECTION, POWDER, FOR SOLUTION INTRAMUSCULAR; INTRAVENOUS at 10:02

## 2020-08-18 NOTE — ED TRIAGE NOTES
Pt reports several days of increasing shortness of breath. States she has COPD and feels as if she is having an exacerbation. Has been in contact with pulmonologist. States \"accidentally\" went to [facility] last night and left AMA when the wait was to long. States she feels as if she cant breath. Pt talks in very long full sentences with no difficulties.

## 2020-08-18 NOTE — ED NOTES
I have reviewed discharge instructions with the patient. The patient verbalized understanding. Patient left ED via Discharge Method: ambulatory to Home with self. Opportunity for questions and clarification provided. Patient given 1 scripts. To continue your aftercare when you leave the hospital, you may receive an automated call from our care team to check in on how you are doing. This is a free service and part of our promise to provide the best care and service to meet your aftercare needs.  If you have questions, or wish to unsubscribe from this service please call [phone]. Thank you for Choosing our [facility] Emergency Department.

## 2021-04-07 PROBLEM — E66.09 CLASS 2 OBESITY DUE TO EXCESS CALORIES WITHOUT SERIOUS COMORBIDITY WITH BODY MASS INDEX (BMI) OF 35.0 TO 35.9 IN ADULT: Status: RESOLVED | Noted: 2017-10-16 | Resolved: 2021-04-07

## 2021-04-07 PROBLEM — E66.01 CLASS 3 SEVERE OBESITY DUE TO EXCESS CALORIES WITHOUT SERIOUS COMORBIDITY WITH BODY MASS INDEX (BMI) OF 40.0 TO 44.9 IN ADULT (HCC): Status: ACTIVE | Noted: 2021-04-07

## 2021-04-07 PROBLEM — E66.01 SEVERE OBESITY WITH BODY MASS INDEX (BMI) OF 35.0 TO 39.9 WITH SERIOUS COMORBIDITY (HCC): Status: RESOLVED | Noted: 2018-07-20 | Resolved: 2021-04-07

## 2021-04-07 PROBLEM — E66.9 OBESITY (BMI 30-39.9): Status: RESOLVED | Noted: 2018-02-21 | Resolved: 2021-04-07

## 2021-07-07 ENCOUNTER — TRANSCRIBE ORDER (OUTPATIENT)
Dept: REGISTRATION | Age: 61
End: 2021-07-07

## 2021-07-07 DIAGNOSIS — Z12.31 SCREENING MAMMOGRAM FOR HIGH-RISK PATIENT: Primary | ICD-10-CM

## 2021-07-19 ENCOUNTER — HOSPITAL ENCOUNTER (OUTPATIENT)
Dept: CT IMAGING | Age: 61
Discharge: HOME | End: 2021-07-19
Attending: NURSE PRACTITIONER
Payer: MEDICARE

## 2021-07-19 DIAGNOSIS — Z87.891 PERSONAL HISTORY OF TOBACCO USE: ICD-10-CM

## 2021-07-19 DIAGNOSIS — Z12.2 SCREENING FOR MALIGNANT NEOPLASM OF RESPIRATORY ORGAN: ICD-10-CM

## 2021-07-19 PROCEDURE — 71271 CT THORAX LUNG CANCER SCR C-: CPT

## 2021-07-19 NOTE — PROGRESS NOTES
CT scan is without worrisome findings for lung cancer. Will discuss at upcoming appt. Message sent to patient via [address]

## 2021-07-23 ENCOUNTER — HOSPITAL ENCOUNTER (OUTPATIENT)
Dept: MAMMOGRAPHY | Age: 61
Discharge: HOME | End: 2021-07-23
Attending: INTERNAL MEDICINE
Payer: MEDICARE

## 2021-07-23 DIAGNOSIS — Z12.31 SCREENING MAMMOGRAM FOR HIGH-RISK PATIENT: ICD-10-CM

## 2021-07-23 PROCEDURE — 77067 SCR MAMMO BI INCL CAD: CPT

## 2022-03-18 PROBLEM — E78.5 HYPERLIPIDEMIA: Status: ACTIVE | Noted: 2017-03-03

## 2022-03-18 PROBLEM — Z99.89 OBSTRUCTIVE SLEEP APNEA ON CPAP: Status: ACTIVE | Noted: 2018-02-21

## 2022-03-18 PROBLEM — G47.33 OBSTRUCTIVE SLEEP APNEA ON CPAP: Status: ACTIVE | Noted: 2018-02-21

## 2022-03-18 PROBLEM — R63.5 RECENT WEIGHT GAIN: Status: ACTIVE | Noted: 2019-01-28

## 2022-03-19 PROBLEM — F33.1 MODERATE EPISODE OF RECURRENT MAJOR DEPRESSIVE DISORDER (HCC): Status: ACTIVE | Noted: 2017-02-06

## 2022-03-19 PROBLEM — E03.9 ACQUIRED HYPOTHYROIDISM: Status: ACTIVE | Noted: 2017-03-03

## 2022-03-20 PROBLEM — E66.01 CLASS 3 SEVERE OBESITY DUE TO EXCESS CALORIES WITHOUT SERIOUS COMORBIDITY WITH BODY MASS INDEX (BMI) OF 40.0 TO 44.9 IN ADULT (HCC): Status: ACTIVE | Noted: 2021-04-07

## 2022-03-20 PROBLEM — M85.89 OSTEOPENIA OF MULTIPLE SITES: Status: ACTIVE | Noted: 2018-07-30

## 2022-03-20 PROBLEM — R10.13 POSTPRANDIAL EPIGASTRIC PAIN: Status: ACTIVE | Noted: 2019-01-28

## 2022-03-20 PROBLEM — E66.813 CLASS 3 SEVERE OBESITY DUE TO EXCESS CALORIES WITHOUT SERIOUS COMORBIDITY WITH BODY MASS INDEX (BMI) OF 40.0 TO 44.9 IN ADULT: Status: ACTIVE | Noted: 2021-04-07

## 2022-08-01 ENCOUNTER — HOSPITAL ENCOUNTER (OUTPATIENT)
Dept: CT IMAGING | Age: 62
Discharge: HOME | End: 2022-08-04
Payer: MEDICARE

## 2022-08-01 VITALS — HEIGHT: 64 IN | WEIGHT: 230 LBS | BODY MASS INDEX: 39.27 KG/M2

## 2022-08-01 DIAGNOSIS — Z87.891 PERSONAL HISTORY OF TOBACCO USE: ICD-10-CM

## 2022-08-01 DIAGNOSIS — Z12.2 SCREENING FOR MALIGNANT NEOPLASM OF RESPIRATORY ORGAN: ICD-10-CM

## 2022-08-01 PROCEDURE — 71271 CT THORAX LUNG CANCER SCR C-: CPT

## 2022-08-05 ENCOUNTER — HOSPITAL ENCOUNTER (OUTPATIENT)
Dept: MAMMOGRAPHY | Age: 62
Discharge: HOME | End: 2022-08-08
Payer: MEDICARE

## 2022-08-05 DIAGNOSIS — Z12.31 VISIT FOR SCREENING MAMMOGRAM: ICD-10-CM

## 2022-08-05 PROCEDURE — 77067 SCR MAMMO BI INCL CAD: CPT

## 2022-09-01 ENCOUNTER — OFFICE VISIT (OUTPATIENT)
Dept: PULMONOLOGY | Age: 62
End: 2022-09-01
Payer: MEDICARE

## 2022-09-01 VITALS
HEIGHT: 63 IN | HEART RATE: 79 BPM | TEMPERATURE: 97.4 F | RESPIRATION RATE: 17 BRPM | DIASTOLIC BLOOD PRESSURE: 100 MMHG | BODY MASS INDEX: 42.17 KG/M2 | OXYGEN SATURATION: 99 % | WEIGHT: 238 LBS | SYSTOLIC BLOOD PRESSURE: 185 MMHG

## 2022-09-01 DIAGNOSIS — J44.9 CHRONIC OBSTRUCTIVE PULMONARY DISEASE, UNSPECIFIED COPD TYPE (HCC): Primary | ICD-10-CM

## 2022-09-01 DIAGNOSIS — G47.33 OBSTRUCTIVE SLEEP APNEA (ADULT) (PEDIATRIC): ICD-10-CM

## 2022-09-01 DIAGNOSIS — E66.01 MORBID (SEVERE) OBESITY DUE TO EXCESS CALORIES (HCC): ICD-10-CM

## 2022-09-01 DIAGNOSIS — Z87.891 PERSONAL HISTORY OF NICOTINE DEPENDENCE: ICD-10-CM

## 2022-09-01 LAB
EXPIRATORY TIME: NORMAL
FEF 25-75% %PRED-PRE: NORMAL
FEF 25-75% PRED: NORMAL
FEF 25-75%-PRE: NORMAL
FEV1 %PRED-PRE: NORMAL %
FEV1 PRED: 2.38 L
FEV1/FVC %PRED-PRE: NORMAL
FEV1/FVC PRED: NORMAL
FEV1/FVC: NORMAL %
FEV1: 1.72 L
FVC %PRED-PRE: NORMAL %
FVC PRED: 3.1 L
FVC: 2.3 L
PEF %PRED-PRE: NORMAL
PEF PRED: NORMAL
PEF-PRE: NORMAL

## 2022-09-01 PROCEDURE — G8427 DOCREV CUR MEDS BY ELIG CLIN: HCPCS | Performed by: NURSE PRACTITIONER

## 2022-09-01 PROCEDURE — 3023F SPIROM DOC REV: CPT | Performed by: NURSE PRACTITIONER

## 2022-09-01 PROCEDURE — G8417 CALC BMI ABV UP PARAM F/U: HCPCS | Performed by: NURSE PRACTITIONER

## 2022-09-01 PROCEDURE — 3017F COLORECTAL CA SCREEN DOC REV: CPT | Performed by: NURSE PRACTITIONER

## 2022-09-01 PROCEDURE — 94010 BREATHING CAPACITY TEST: CPT | Performed by: INTERNAL MEDICINE

## 2022-09-01 PROCEDURE — 1036F TOBACCO NON-USER: CPT | Performed by: NURSE PRACTITIONER

## 2022-09-01 PROCEDURE — 99213 OFFICE O/P EST LOW 20 MIN: CPT | Performed by: NURSE PRACTITIONER

## 2022-09-01 RX ORDER — ALBUTEROL SULFATE 90 UG/1
2 AEROSOL, METERED RESPIRATORY (INHALATION) EVERY 4 HOURS PRN
Qty: 18 G | Refills: 11 | Status: SHIPPED | OUTPATIENT
Start: 2022-09-01

## 2022-09-01 RX ORDER — FLUTICASONE PROPIONATE AND SALMETEROL 100; 50 UG/1; UG/1
1 POWDER RESPIRATORY (INHALATION) 2 TIMES DAILY
Qty: 3 EACH | Refills: 3 | Status: SHIPPED | OUTPATIENT
Start: 2022-09-01

## 2022-09-01 ASSESSMENT — PULMONARY FUNCTION TESTS
FVC_PREDICTED: 3.10
FEV1: 1.72
FEV1_PREDICTED: 2.38
FVC: 2.30

## 2022-09-01 NOTE — PROGRESS NOTES
Patient Name:  Martin Oropeza                             YOB: 1960  MRN: [id number]                                              Office Visit 9/1/2022    ASSESSMENT AND PLAN:  (Medical Decision Making)    Gennaro Alfonso was seen today for copd. Diagnoses and all orders for this visit:    Chronic obstructive pulmonary disease, unspecified COPD type ([facility] [address]  -     Spirometry Without Bronchodilator  --continue Advair and Spiriva. Personal history of nicotine dependence  --smoke free since 2011. LDCT last month. Will need repeat LDCT in 1 year--will arrange at next visit. Obstructive sleep apnea (adult) (pediatric)  --continue CPAP--followed in sleep clinic    Morbid (severe) obesity due to excess calories ([facility] [address]  --Weight loss is advised. Discussed portion control and increased activity level. Sugar and carbohydrate restriction discussed. Recommended lean protein emphasis in diet, with elimination of sugared beverages including soda pop and juices. Decrease carbs such as pasta, rice, cereal, bread, potatoes emphasized. Whole wheat and multigrain to replace all processed flour. Orders Placed This Encounter   Medications    albuterol sulfate HFA (PROVENTIL;VENTOLIN;PROAIR) 108 (90 Base) MCG/ACT inhaler     Sig: Inhale 2 puffs into the lungs every 4 hours as needed for Wheezing     Dispense:  18 g     Refill:  11    tiotropium (SPIRIVA RESPIMAT) 2.5 MCG/ACT AERS inhaler     Sig: Inhale 2 puffs into the lungs daily     Dispense:  3 each     Refill:  3    fluticasone-salmeterol (ADVAIR DISKUS) 100-50 MCG/ACT AEPB diskus inhaler     Sig: Inhale 1 puff into the lungs 2 times daily     Dispense:  3 each     Refill:  3         Follow-up and Dispositions    Return in about 6 months (around 3/1/2023) for Mary Moore or MD, COPD, Arrive 15 minutes prior to appt time. Collaborating physician is Dr. Krishan Prabhakar.     RIC Lee, NP, APRN - CNP    _________________________________________________________________________    HISTORY OF PRESENT ILLNESS:    Ms. Noam Reyes is a 59 y.o. female who is seen at [facility] for  COPD     The patient is a 57-year-old female who is seen for follow-up of COPD. She has a 30-pack-year history of cigarette smoking, but quit smoking in 2011. Reports nighty compliance  with CPAP and no problems tolerating mask or pressure. Denies any exacerbations since last visit. Remains on Advair and Spiriva. Had to use albuterol over the weekend for wheezing, but this has resolved. .  Reflux is under fair control. Has noticed more STRINGER since starting back to school and doing online classes. Had screening CT 8/1/2022 which did not reveal any worrisome findings for lung cancer. REVIEW OF SYSTEMS: 10 point review of systems is negative except as reported in HPI. PHYSICAL EXAM: Body mass index is 42.16 kg/m². Vitals:    09/01/22 1058   BP: (!) 185/100   Pulse: 79   Resp: 17   Temp: 97.4 °F (36.3 °C)   SpO2: 99%   Weight: 238 lb (108 kg)   Height: 5' 3\" (1.6 m)         General:   Alert, cooperative, no distress, appears stated age. Eyes:   Conjunctivae/corneas clear. PERRL        Mouth/Throat:  Lips, mucosa, and tongue normal. Teeth and gums normal.        Lungs:    Breath sounds clear bilaterally. Heart:   Regular rate and rhythm, S1, S2 normal, no murmur, click, rub or gallop. Abdomen:    Soft, non-tender. Extremities:  Extremities normal, atraumatic, no cyanosis or edema.      Skin:  Skin color normal. No rashes or lesions     Neurologic:  A&Ox3     DIAGNOSTIC TESTS:                                                                                    LABS:   Lab Results   Component Value Date/Time    WBC 16.0 08/18/2020 09:02 AM    HGB 12.9 08/18/2020 09:02 AM    HCT 30.2 08/18/2020 09:02 AM     08/18/2020 09:02 AM    ESR 19 03/31/2020 12:19 PM     Imaging: I performed an independent interpretation of the patient's images. CXR:   XR CHEST PORTABLE 08/18/2020    Narrative  Chest X-ray    INDICATION: Shortness of breath    A portable AP view of the chest was obtained. FINDINGS: The lungs are clear. There are no infiltrates or effusions. The heart  size is normal.  There is mild vascular calcification. Impression  IMPRESSION: No acute findings in the chest    CT Chest:   CT LUNG SCREENING 08/01/2022    Narrative  CT chest without contrast using Low Dose Screening Technique    Comparison: Chest CT dated 7/19/2021    Indication: Tobacco dependence, cigarette smoking  Age: 59  Pack-year history: 30  Current smoker: No  Any current signs or symptoms of lung carcinoma: No      Technique:  Transaxial CT imaging from the thoracic inlet through the lung bases  was performed. Low dose protocol was implemented. (LDCT) Slice thickness is  3.63 mm. Radiation dose reduction techniques were used for this study. Our CT  scanners use one or all of the following: Automated exposure control, adjustment  of the mA and/or kV according to patient size, iterative reconstruction. FINDINGS:    No mediastinal or axillary adenopathy. No pleural or pericardial effusion. The  lungs are well expanded and clear. There is no pneumothorax. No suspicious  pulmonary nodules appreciated. Limited evaluation of the upper abdomen is unremarkable. No aggressive osseous is identified. Impression  1. No acute cardiopulmonary abnormality. 2. No suspicious pulmonary nodules. L1 Negative: No nodules or nodules with specific calcifications such as  complete, central, popcorn, concentric rings, and fat containing nodules. Continue annual screening with noncontrast chest CT using LDCT protocol in 12  months. Please note that a negative study does not mean the patient does not have lung  cancer.     Nuclear Medicine:   NM HEPATOBILIARY SCAN WITH PHARM AGENT     PFTs:   Office Spirometry Results Latest Ref Rng & Units 9/1/2022   FVC L 2.30   FEV1 L 1.72   FEV1 %PRED-PRE % 72%   FVC %PRED-PRE % 74%   FEV1/FVC % 75%     No results found for this or any previous visit. No results found for this or any previous visit. FeNO: No results found for this or any previous visit. FeNO and Likelihood of Eosinophilic Asthma   Unlikely Intermediate Likely   <25 ppb 25-50 ppb >50ppb     Echo: No results found for this or any previous visit from the past 3650 days.     PMH Reference Info:                                                                                                                  Past Medical History:   Diagnosis Date    Allergic rhinitis 4/3/2014    Anxiety 5/30/2014    Asthma 5/30/2014    Circadian rhythm disorder     COPD (chronic obstructive pulmonary disease) ([facility] [address] 4/3/2014    DDD (degenerative disc disease) 4/3/2014    Depression, major, recurrent ([facility] [address] 4/3/2014    controlled with meds     GERD (gastroesophageal reflux disease) 4/3/2014    controlled with meds     HLD (hyperlipidemia) 4/3/2014    Hyposmolality and/or hyponatremia 4/3/2014    Hypothyroidism 5/30/2014    controlled with medication     Leg edema     Neurogenic bladder 8/14    self cath 4 times daily, sees Dr. Eduarda Gottlieb PGU    Obesity 4/3/2014    Sleep apnea 5/10/2014    CPAP machine at      Unspecified adverse effect of anesthesia     difficult to wake up     Vitamin D deficiency 4/3/2014    Xerostomia 4/3/2014      Tobacco Use: Medium Risk    Smoking Tobacco Use: Former    Smokeless Tobacco Use: Never     Allergies   Allergen Reactions    Oxycodone Nausea And Vomiting and Swelling     Pt states having great swelling with Oxycodone     Current Outpatient Medications   Medication Instructions    albuterol (PROVENTIL) 2.5 mg, Inhalation, EVERY 4 HOURS PRN    albuterol sulfate HFA (PROVENTIL;VENTOLIN;PROAIR) 108 (90 Base) MCG/ACT inhaler 2 puffs, Inhalation, EVERY 4 HOURS PRN    amitriptyline (ELAVIL) 25 mg, Oral    ARIPiprazole (ABILIFY) 15 mg, Oral, DAILY    baclofen (LIORESAL) 10 mg, Oral, 2 TIMES DAILY    calcium carbonate-vitamin D (CALTRATE) 600-400 MG-UNIT TABS per tab 1 tablet, Oral, DAILY    CPAP Machine MISC Does not apply    divalproex (DEPAKOTE) 125 mg, Oral, 2 TIMES DAILY    escitalopram (LEXAPRO) 10 mg, DAILY    famotidine (PEPCID) 40 mg, Oral    fluticasone-salmeterol (ADVAIR DISKUS) 100-50 MCG/ACT AEPB diskus inhaler 1 puff, Inhalation, 2 TIMES DAILY    levothyroxine (SYNTHROID) 137 MCG tablet Take 1 tablet by mouth once daily BEFORE BREAKFAST    liothyronine (CYTOMEL) 5 mcg, Oral, DAILY    magnesium oxide (MAG-OX) 400 mg, Oral, DAILY    pantoprazole (PROTONIX) 40 mg, Oral, 2 TIMES DAILY    raloxifene (EVISTA) 60 mg, Oral, DAILY    tiotropium (SPIRIVA RESPIMAT) 2.5 MCG/ACT AERS inhaler 2 puffs, Inhalation, DAILY    verapamil (CALAN) 80 MG tablet Oral, DAILY    vitamin D 4,000 Units, Oral, DAILY

## 2022-09-26 ENCOUNTER — PATIENT MESSAGE (OUTPATIENT)
Dept: PULMONOLOGY | Age: 62
End: 2022-09-26

## 2022-09-26 NOTE — TELEPHONE ENCOUNTER
From: Puma Olivas  To: Chris Interiano  Sent: 2022 10:54 AM EDT  Subject: cough    July,  I have been coughing for the past couple of weeks, with it getting a bit worse every day. I am taking my inhalers like I'm supposed to along with the rescue inhaler when needed, which is about 3x per week lately. Today, I remembered a bottle of   Promethazine DM Syrup I was prescribed on 22 by Dr. Tanner West (I believe he was the doctor that saw me when I went to Urgent Care for bronchitis). It has a Use Before date of 23. I took 5ml at 10:30 and it seems to help some. Is there anything else I should be doing? Also, I'm not sure how long to take this cough syrup, and really do not have much left in the bottle. Puma Olivas  : 1960  [email or web address]  [phone]

## 2022-09-27 ENCOUNTER — TELEMEDICINE (OUTPATIENT)
Dept: PULMONOLOGY | Age: 62
End: 2022-09-27
Payer: MEDICARE

## 2022-09-27 DIAGNOSIS — J44.1 COPD EXACERBATION (HCC): Primary | ICD-10-CM

## 2022-09-27 PROCEDURE — 99441 PR PHYS/QHP TELEPHONE EVALUATION 5-10 MIN: CPT | Performed by: NURSE PRACTITIONER

## 2022-09-27 RX ORDER — DOXYCYCLINE HYCLATE 100 MG
100 TABLET ORAL 2 TIMES DAILY
Qty: 14 TABLET | Refills: 0 | Status: SHIPPED | OUTPATIENT
Start: 2022-09-27 | End: 2022-10-04

## 2022-09-27 RX ORDER — GABAPENTIN 300 MG/1
CAPSULE ORAL
COMMUNITY
Start: 2022-09-06

## 2022-09-27 RX ORDER — PREDNISONE 20 MG/1
TABLET ORAL
Qty: 15 TABLET | Refills: 0 | Status: SHIPPED | OUTPATIENT
Start: 2022-09-27 | End: 2022-10-17 | Stop reason: ALTCHOICE

## 2022-09-27 NOTE — PROGRESS NOTES
Patient Name:  Jannette Pedraza                             YOB: 1960  MRN: [id number]                                              Virtual Telephone Visit 9/27/2022    The patient is evaluated via audio only technology on 9/27/2022      Consent: The patient and/or their health care decision maker is aware that they may receive a bill for this audio only encounter, depending on their insurance coverage, and has provided verbal consent to proceed: Yes        I affirm this is a Patient-Initiated Episode with a Patient who has not had a related appointment within my department in the past 7 days or scheduled within the next 24 hours. I was at [facility] while conducting this visit. We discussed the expected course, resolution and complications of the diagnosis(es) in detail. Medication risks, benefits, costs, interactions, and alternatives were discussed as indicated. I advised the patient to contact the office if his/her condition worsens, changes or fails to improve as anticipated. The patient expressed understanding with the diagnosis(es) and plan. Jannette Pedraza, was evaluated through a synchronous (real-time) audio encounter. The patient (or guardian if applicable) is aware that this is a billable service, which includes applicable co-pays. This Virtual Visit was conducted with patient's (and/or legal guardian's) consent. The visit was conducted pursuant to the emergency declaration under the [address] [place] [address], [address] [place] [address] authority and the [employer] General Act. Patient identification was verified, and a caregiver was present when appropriate. The patient was located in a state where the provider was licensed to provide care. ASSESSMENT AND PLAN:  (Medical Decision Making)    Renita Watson was seen today for copd.     Diagnoses and all orders for this visit:    COPD exacerbation ([facility] [address]  -- Acute exacerbation which requires intensification of therapy. --Prednisone taper as below as well as doxycycline twice daily. --Use nebulized albuterol 4 times a day on a scheduled basis for the next 5 days and then taper back to as needed as symptoms improve. --Mucinex 1200 mg twice daily until congestion improves. --If O2 sats are consistently below 90 or if symptoms do not improve. She needs to go to the emergency room. Other orders  -     doxycycline hyclate (VIBRA-TABS) 100 MG tablet; Take 1 tablet by mouth 2 times daily for 7 days  -     predniSONE (DELTASONE) 20 MG tablet; 2 tabs po qd x 3 days, 1.5 tabs po qd x 3 days, 1 tab po qd x 3 days, 1/2 tab po qd x 3 days. Orders Placed This Encounter   Medications    doxycycline hyclate (VIBRA-TABS) 100 MG tablet     Sig: Take 1 tablet by mouth 2 times daily for 7 days     Dispense:  14 tablet     Refill:  0    predniSONE (DELTASONE) 20 MG tablet     Si tabs po qd x 3 days, 1.5 tabs po qd x 3 days, 1 tab po qd x 3 days, 1/2 tab po qd x 3 days. Dispense:  15 tablet     Refill:  0     No orders of the defined types were placed in this encounter. Follow-up and Dispositions    Return for per recall. Collaborating physician is Dr. Gary Damon. CORINNE Chinchilla, NP, APRN - CNP    Total time for encounter on day of encounter was 10 minutes. _________________________________________________________________________    HISTORY OF PRESENT ILLNESS:    Ms. Torrence Gottron is a 59 y.o. female who is seen at [facility] for  COPD    The patient is a 57-year-old female who is seen for urgent walk-in visit for complaints of cough and congestion. Her O2 sats have been running around 91%. Symptoms have been present for 2 weeks. She is wheezing. She is coughing up thick purulent secretions. She denies any fever or chills. Her temp max has been 96.6.   Her albuterol inhaler really has not been helping much and thus she has been using albuterol and her nebulizer twice daily for relief of symptoms. She denies sore throat and runny nose. REVIEW OF SYSTEMS: 10 point review of systems is negative except as reported in HPI. PHYSICAL EXAM:     Vital Signs: (As obtained by patient/caregiver at home)  Patient-Reported Vitals 9/27/2022   Patient-Reported Weight 233.0lb   Patient-Reported Height 5f4i   Patient-Reported Systolic 766   Patient-Reported Diastolic 88   Patient-Reported Pulse 82   Patient-Reported Temperature -   Patient-Reported SpO2 91 room air       Audibly wheezing on phone. DIAGNOSTIC TESTS:                                                                                    LABS:   Lab Results   Component Value Date/Time    WBC 16.0 08/18/2020 09:02 AM    HGB 12.9 08/18/2020 09:02 AM    HCT 30.2 08/18/2020 09:02 AM     08/18/2020 09:02 AM    ESR 19 03/31/2020 12:19 PM     Imaging: I performed an independent interpretation of the patient's images. CXR:   XR CHEST PORTABLE 08/18/2020    Narrative  Chest X-ray    INDICATION: Shortness of breath    A portable AP view of the chest was obtained. FINDINGS: The lungs are clear. There are no infiltrates or effusions. The heart  size is normal.  There is mild vascular calcification. Impression  IMPRESSION: No acute findings in the chest    CT Chest:   CT LUNG SCREENING 08/01/2022    Narrative  CT chest without contrast using Low Dose Screening Technique    Comparison: Chest CT dated 7/19/2021    Indication: Tobacco dependence, cigarette smoking  Age: 59  Pack-year history: 30  Current smoker: No  Any current signs or symptoms of lung carcinoma: No      Technique:  Transaxial CT imaging from the thoracic inlet through the lung bases  was performed. Low dose protocol was implemented. (LDCT) Slice thickness is  3.54 mm. Radiation dose reduction techniques were used for this study.  Our CT  scanners use one or all of the following: Automated exposure control, adjustment  of the mA and/or kV according to patient size, iterative reconstruction. FINDINGS:    No mediastinal or axillary adenopathy. No pleural or pericardial effusion. The  lungs are well expanded and clear. There is no pneumothorax. No suspicious  pulmonary nodules appreciated. Limited evaluation of the upper abdomen is unremarkable. No aggressive osseous is identified. Impression  1. No acute cardiopulmonary abnormality. 2. No suspicious pulmonary nodules. L1 Negative: No nodules or nodules with specific calcifications such as  complete, central, popcorn, concentric rings, and fat containing nodules. Continue annual screening with noncontrast chest CT using LDCT protocol in 12  months. Please note that a negative study does not mean the patient does not have lung  cancer. Nuclear Medicine:   NM HEPATOBILIARY SCAN WITH PHARM AGENT     PFTs:   Office Spirometry Results Latest Ref Rng & Units 9/1/2022   FVC L 2.30   FEV1 L 1.72   FEV1 %PRED-PRE % 72%   FVC %PRED-PRE % 74%   FEV1/FVC % 75%       Echo: No results found for this or any previous visit from the past 3650 days.     [facility] Reference Info:                                                                                                                  Past Medical History:   Diagnosis Date    Allergic rhinitis 4/3/2014    Anxiety 5/30/2014    Asthma 5/30/2014    Circadian rhythm disorder     COPD (chronic obstructive pulmonary disease) ([address] 4/3/2014    DDD (degenerative disc disease) 4/3/2014    Depression, major, recurrent ([address] 4/3/2014    controlled with meds     GERD (gastroesophageal reflux disease) 4/3/2014    controlled with meds     HLD (hyperlipidemia) 4/3/2014    Hyposmolality and/or hyponatremia 4/3/2014    Hypothyroidism 5/30/2014    controlled with medication     Leg edema     Neurogenic bladder 8/14    self cath 4 times daily, sees Dr. Mary Mayen PGU    Obesity 4/3/2014    Sleep apnea 5/10/2014    CPAP machine at      Unspecified adverse effect of anesthesia     difficult to wake up     Vitamin D deficiency 4/3/2014    Xerostomia 4/3/2014        Tobacco Use      Smoking status: Former        Packs/day: 1.00        Years: 30.00        Pack years: 30        Types: Cigarettes        Quit date: 2011        Years since quittin.6      Smokeless tobacco: Never    Allergies   Allergen Reactions    Oxycodone Nausea And Vomiting and Swelling     Pt states having great swelling with Oxycodone     Current Outpatient Medications   Medication Instructions    albuterol (PROVENTIL) 2.5 mg, Inhalation, EVERY 4 HOURS PRN    albuterol sulfate HFA (PROVENTIL;VENTOLIN;PROAIR) 108 (90 Base) MCG/ACT inhaler 2 puffs, Inhalation, EVERY 4 HOURS PRN    amitriptyline (ELAVIL) 25 mg, Oral    ARIPiprazole (ABILIFY) 15 mg, Oral, DAILY    baclofen (LIORESAL) 10 mg, Oral, 2 TIMES DAILY    calcium carbonate-vitamin D (CALTRATE) 600-400 MG-UNIT TABS per tab 1 tablet, Oral, DAILY    CPAP Machine MISC Does not apply    divalproex (DEPAKOTE) 125 mg, Oral, 2 TIMES DAILY    doxycycline hyclate (VIBRA-TABS) 100 mg, Oral, 2 TIMES DAILY    escitalopram (LEXAPRO) 10 mg, Oral, DAILY    famotidine (PEPCID) 40 mg, Oral    fluticasone-salmeterol (ADVAIR DISKUS) 100-50 MCG/ACT AEPB diskus inhaler 1 puff, Inhalation, 2 TIMES DAILY    gabapentin (NEURONTIN) 300 MG capsule No dose, route, or frequency recorded. levothyroxine (SYNTHROID) 137 MCG tablet Take 1 tablet by mouth once daily BEFORE BREAKFAST    liothyronine (CYTOMEL) 5 mcg, Oral, DAILY    magnesium oxide (MAG-OX) 400 mg, Oral, DAILY    pantoprazole (PROTONIX) 40 mg, Oral, 2 TIMES DAILY    predniSONE (DELTASONE) 20 MG tablet 2 tabs po qd x 3 days, 1.5 tabs po qd x 3 days, 1 tab po qd x 3 days, 1/2 tab po qd x 3 days.     raloxifene (EVISTA) 60 mg, Oral, DAILY    tiotropium (SPIRIVA RESPIMAT) 2.5 MCG/ACT AERS inhaler 2 puffs, Inhalation, DAILY    verapamil (CALAN) 80 MG tablet Oral, DAILY    vitamin D 4,000 Units, Oral, DAILY

## 2022-09-30 ENCOUNTER — HOSPITAL ENCOUNTER (EMERGENCY)
Age: 62
Discharge: HOME | End: 2022-09-30
Attending: EMERGENCY MEDICINE
Payer: MEDICARE

## 2022-09-30 ENCOUNTER — APPOINTMENT (OUTPATIENT)
Dept: CT IMAGING | Age: 62
End: 2022-09-30
Payer: MEDICARE

## 2022-09-30 ENCOUNTER — APPOINTMENT (OUTPATIENT)
Dept: GENERAL RADIOLOGY | Age: 62
End: 2022-09-30
Payer: MEDICARE

## 2022-09-30 VITALS
HEART RATE: 59 BPM | DIASTOLIC BLOOD PRESSURE: 82 MMHG | HEIGHT: 63 IN | OXYGEN SATURATION: 96 % | TEMPERATURE: 97.8 F | SYSTOLIC BLOOD PRESSURE: 139 MMHG | WEIGHT: 243 LBS | RESPIRATION RATE: 22 BRPM | BODY MASS INDEX: 43.05 KG/M2

## 2022-09-30 DIAGNOSIS — I10 HYPERTENSION, UNSPECIFIED TYPE: Primary | ICD-10-CM

## 2022-09-30 LAB
ANION GAP SERPL CALC-SCNC: 6 MMOL/L (ref 4–13)
BASOPHILS # BLD: 0.1 K/UL (ref 0–0.2)
BASOPHILS NFR BLD: 1 % (ref 0–2)
BUN SERPL-MCNC: 12 MG/DL (ref 8–23)
CALCIUM SERPL-MCNC: 9.1 MG/DL (ref 8.3–10.4)
CHLORIDE SERPL-SCNC: 98 MMOL/L (ref 101–110)
CO2 SERPL-SCNC: 28 MMOL/L (ref 21–32)
CREAT SERPL-MCNC: 0.8 MG/DL (ref 0.6–1)
DIFFERENTIAL METHOD BLD: ABNORMAL
EKG ATRIAL RATE: 75 BPM
EKG DIAGNOSIS: NORMAL
EKG P AXIS: 83 DEGREES
EKG P-R INTERVAL: 167 MS
EKG Q-T INTERVAL: 383 MS
EKG QRS DURATION: 95 MS
EKG QTC CALCULATION (BAZETT): 425 MS
EKG R AXIS: 53 DEGREES
EKG T AXIS: 44 DEGREES
EKG VENTRICULAR RATE: 74 BPM
EOSINOPHIL # BLD: 0 K/UL (ref 0–0.8)
EOSINOPHIL NFR BLD: 0 % (ref 0.5–7.8)
ERYTHROCYTE [DISTWIDTH] IN BLOOD BY AUTOMATED COUNT: 14.1 % (ref 11.9–14.6)
GLUCOSE SERPL-MCNC: 85 MG/DL (ref 65–100)
HCT VFR BLD AUTO: 35.9 % (ref 35.8–46.3)
HGB BLD-MCNC: 11.9 G/DL (ref 11.7–15.4)
IMM GRANULOCYTES # BLD AUTO: 0.2 K/UL (ref 0–0.5)
IMM GRANULOCYTES NFR BLD AUTO: 1 % (ref 0–5)
LYMPHOCYTES # BLD: 3.4 K/UL (ref 0.5–4.6)
LYMPHOCYTES NFR BLD: 32 % (ref 13–44)
MCH RBC QN AUTO: 27.4 PG (ref 26.1–32.9)
MCHC RBC AUTO-ENTMCNC: 33.1 G/DL (ref 31.4–35)
MCV RBC AUTO: 82.7 FL (ref 79.6–97.8)
MONOCYTES # BLD: 0.7 K/UL (ref 0.1–1.3)
MONOCYTES NFR BLD: 6 % (ref 4–12)
NEUTS SEG # BLD: 6.4 K/UL (ref 1.7–8.2)
NEUTS SEG NFR BLD: 60 % (ref 43–78)
NRBC # BLD: 0 K/UL (ref 0–0.2)
NT PRO BNP: 414 PG/ML (ref 5–125)
PLATELET # BLD AUTO: 335 K/UL (ref 150–450)
PMV BLD AUTO: 10.4 FL (ref 9.4–12.3)
POTASSIUM SERPL-SCNC: 4 MMOL/L (ref 3.5–5.1)
RBC # BLD AUTO: 4.34 M/UL (ref 4.05–5.2)
SODIUM SERPL-SCNC: 132 MMOL/L (ref 136–145)
TROPONIN I SERPL HS-MCNC: 5.7 PG/ML (ref 0–14)
WBC # BLD AUTO: 10.7 K/UL (ref 4.3–11.1)

## 2022-09-30 PROCEDURE — 99285 EMERGENCY DEPT VISIT HI MDM: CPT

## 2022-09-30 PROCEDURE — 85025 COMPLETE CBC W/AUTO DIFF WBC: CPT

## 2022-09-30 PROCEDURE — 83880 ASSAY OF NATRIURETIC PEPTIDE: CPT

## 2022-09-30 PROCEDURE — 84484 ASSAY OF TROPONIN QUANT: CPT

## 2022-09-30 PROCEDURE — 70450 CT HEAD/BRAIN W/O DYE: CPT

## 2022-09-30 PROCEDURE — 71045 X-RAY EXAM CHEST 1 VIEW: CPT

## 2022-09-30 PROCEDURE — 93005 ELECTROCARDIOGRAM TRACING: CPT | Performed by: EMERGENCY MEDICINE

## 2022-09-30 PROCEDURE — 80048 BASIC METABOLIC PNL TOTAL CA: CPT

## 2022-09-30 PROCEDURE — 6370000000 HC RX 637 (ALT 250 FOR IP): Performed by: EMERGENCY MEDICINE

## 2022-09-30 RX ORDER — CLONIDINE HYDROCHLORIDE 0.1 MG/1
0.1 TABLET ORAL 2 TIMES DAILY
Qty: 28 TABLET | Refills: 0 | Status: SHIPPED | OUTPATIENT
Start: 2022-09-30 | End: 2022-10-17

## 2022-09-30 RX ORDER — ACETAMINOPHEN 325 MG/1
650 TABLET ORAL
Status: COMPLETED | OUTPATIENT
Start: 2022-09-30 | End: 2022-09-30

## 2022-09-30 RX ORDER — CLONIDINE HYDROCHLORIDE 0.1 MG/1
0.1 TABLET ORAL
Status: COMPLETED | OUTPATIENT
Start: 2022-09-30 | End: 2022-09-30

## 2022-09-30 RX ADMIN — CLONIDINE HYDROCHLORIDE 0.1 MG: 0.1 TABLET ORAL at 10:42

## 2022-09-30 RX ADMIN — ACETAMINOPHEN 650 MG: 325 TABLET, FILM COATED ORAL at 10:44

## 2022-09-30 ASSESSMENT — ENCOUNTER SYMPTOMS
COUGH: 1
SHORTNESS OF BREATH: 1
ABDOMINAL PAIN: 0
NAUSEA: 1
SORE THROAT: 0
DIARRHEA: 0
BACK PAIN: 0
VOMITING: 0

## 2022-09-30 ASSESSMENT — PAIN SCALES - GENERAL
PAINLEVEL_OUTOF10: 8
PAINLEVEL_OUTOF10: 8

## 2022-09-30 ASSESSMENT — PAIN DESCRIPTION - ORIENTATION: ORIENTATION: LEFT

## 2022-09-30 ASSESSMENT — PAIN - FUNCTIONAL ASSESSMENT: PAIN_FUNCTIONAL_ASSESSMENT: 0-10

## 2022-09-30 ASSESSMENT — PAIN DESCRIPTION - LOCATION: LOCATION: HEAD

## 2022-09-30 ASSESSMENT — PAIN DESCRIPTION - DESCRIPTORS: DESCRIPTORS: ACHING

## 2022-09-30 NOTE — ED NOTES
I have reviewed discharge instructions with the patient. The patient verbalized understanding. Patient left ED via Discharge Method: ambulatory to Home with self. Opportunity for questions and clarification provided. Patient given 1 scripts. To continue your aftercare when you leave the hospital, you may receive an automated call from our care team to check in on how you are doing. This is a free service and part of our promise to provide the best care and service to meet your aftercare needs.  If you have questions, or wish to unsubscribe from this service please call [phone]. Thank you for Choosing our [facility] Emergency Department.       Nancy Aldrich RN  09/30/22 1650

## 2022-09-30 NOTE — ED TRIAGE NOTES
Patient ambulatory to triage with mask in place. Patient reports hypertension since Tuesday when she started prednisone and doxycycline. Pt reports she just started back on her veramapil after being off of it for 2 weeks. Pt reports headache.

## 2022-09-30 NOTE — ED PROVIDER NOTES
[facility], [place] Emergency Department Provider Note                   PCP:                Mennie Councilman, MD               Age: 59 y.o. Sex: female       Lex Eastman is a 59 y.o. female who presents to the Emergency Department with chief complaint of    Chief Complaint   Patient presents with    Hypertension      Patient states for the last couple weeks she has been having cough and shortness of breath. Patient does have COPD and has been using her nebulizers. She called her pulmonologist on Tuesday who put her on prednisone and an antibiotic. She started checking her vital signs per her doctor's request.  She states her blood pressure has been running high. Her systolic blood pressure has been between 180-200. She then reviewed her medications and realize that she has not been on verapamil since the end of August because her insurance company did not get a renewal of it from her primary doctor. Patient states that she did get verapamil 2 days ago and started taking it but her blood pressure still been high. She states yesterday she started developing a moderate kind of diffuse headache that radiates to her neck. Patient denies any chest or back pain. The history is provided by the patient. All other systems reviewed and are negative. Review of Systems   Constitutional:  Negative for chills, fatigue and fever. HENT:  Negative for congestion and sore throat. Eyes:  Negative for visual disturbance. Respiratory:  Positive for cough and shortness of breath. Cardiovascular:  Negative for chest pain. Gastrointestinal:  Positive for nausea. Negative for abdominal pain, diarrhea and vomiting. Genitourinary:  Negative for dysuria. Musculoskeletal:  Positive for neck pain. Negative for back pain. Skin:  Negative for rash. Neurological:  Positive for dizziness and headaches. Negative for weakness and numbness. Psychiatric/Behavioral:  Negative for confusion.       Past Medical History:   Diagnosis Date    Allergic rhinitis 4/3/2014    Anxiety 2014    Asthma 2014    Circadian rhythm disorder     COPD (chronic obstructive pulmonary disease) ([facility] [address] 4/3/2014    DDD (degenerative disc disease) 4/3/2014    Depression, major, recurrent ([facility] [address] 4/3/2014    controlled with meds     GERD (gastroesophageal reflux disease) 4/3/2014    controlled with meds     HLD (hyperlipidemia) 4/3/2014    Hyposmolality and/or hyponatremia 4/3/2014    Hypothyroidism 2014    controlled with medication     Leg edema     Neurogenic bladder     self cath 4 times daily, sees Dr. Odalis Stewart Obesity 4/3/2014    Sleep apnea 5/10/2014    CPAP machine at      Unspecified adverse effect of anesthesia     difficult to wake up     Vitamin D deficiency 4/3/2014    Xerostomia 4/3/2014        Past Surgical History:   Procedure Laterality Date    APPENDECTOMY      CARPAL TUNNEL RELEASE Right     HEENT      cataract bilat    HYSTERECTOMY (CERVIX STATUS UNKNOWN)      only L ovary remaining     ORTHOPEDIC SURGERY      R carpal tunnel release, L foot surg, R knee    OVARY REMOVAL      one side only-unsure which side    TUBAL LIGATION          Family History   Problem Relation Age of Onset    Emphysema Father     Cancer Mother         breast    Lung Disease Father     Breast Cancer Mother 46        Social History     Socioeconomic History    Marital status:    Tobacco Use    Smoking status: Former     Packs/day: 1.00     Years: 30.00     Pack years: 30.00     Types: Cigarettes     Quit date: 2011     Years since quittin.6    Smokeless tobacco: Never   Substance and Sexual Activity    Alcohol use: No    Drug use: No   Social History Narrative     and lives with . Has lived in [place], but has lived in [place] since [address] [facility]. Has one cat.           Allergies: Oxycodone    Previous Medications    ALBUTEROL (PROVENTIL) (2.5 MG/3ML) 0.083% NEBULIZER SOLUTION Inhale 2.5 mg into the lungs every 4 hours as needed    ALBUTEROL SULFATE HFA (PROVENTIL;VENTOLIN;PROAIR) 108 (90 BASE) MCG/ACT INHALER    Inhale 2 puffs into the lungs every 4 hours as needed for Wheezing    AMITRIPTYLINE (ELAVIL) 25 MG TABLET    Take 25 mg by mouth    ARIPIPRAZOLE (ABILIFY) 15 MG TABLET    Take 15 mg by mouth daily    BACLOFEN (LIORESAL) 10 MG TABLET    Take 10 mg by mouth 2 times daily    CALCIUM CARBONATE-VITAMIN D (CALTRATE) 600-400 MG-UNIT TABS PER TAB    Take 1 tablet by mouth daily    CPAP MACHINE MISC    by Does not apply route    DIVALPROEX (DEPAKOTE) 125 MG DR TABLET    Take 125 mg by mouth 2 times daily    DOXYCYCLINE HYCLATE (VIBRA-TABS) 100 MG TABLET    Take 1 tablet by mouth 2 times daily for 7 days    ESCITALOPRAM (LEXAPRO) 10 MG TABLET    Take 10 mg by mouth daily    FAMOTIDINE (PEPCID) 40 MG TABLET    Take 40 mg by mouth    FLUTICASONE-SALMETEROL (ADVAIR DISKUS) 100-50 MCG/ACT AEPB DISKUS INHALER    Inhale 1 puff into the lungs 2 times daily    GABAPENTIN (NEURONTIN) 300 MG CAPSULE        LEVOTHYROXINE (SYNTHROID) 137 MCG TABLET    Take 1 tablet by mouth once daily BEFORE BREAKFAST    LIOTHYRONINE (CYTOMEL) 5 MCG TABLET    Take 5 mcg by mouth daily    MAGNESIUM OXIDE (MAG-OX) 400 MG TABLET    Take 400 mg by mouth daily    PANTOPRAZOLE (PROTONIX) 40 MG TABLET    Take 40 mg by mouth 2 times daily    PREDNISONE (DELTASONE) 20 MG TABLET    2 tabs po qd x 3 days, 1.5 tabs po qd x 3 days, 1 tab po qd x 3 days, 1/2 tab po qd x 3 days. RALOXIFENE (EVISTA) 60 MG TABLET    Take 60 mg by mouth daily    TIOTROPIUM (SPIRIVA RESPIMAT) 2.5 MCG/ACT AERS INHALER    Inhale 2 puffs into the lungs daily    VERAPAMIL (CALAN) 80 MG TABLET    Take by mouth daily    VITAMIN D 25 MCG (1000 UT) CAPS    Take 4,000 Units by mouth daily        Vitals signs and nursing note reviewed.    Patient Vitals for the past 4 hrs:   Temp Pulse Resp BP SpO2   09/30/22 1129 -- 74 17 (!) 158/83 98 %   09/30/22 1114 -- 72 18 (!) 164/86 98 %   22 1044 -- 74 22 (!) 146/96 100 %   22 1042 -- -- -- (!) 153/84 --   22 1039 -- 73 15 (!) 153/84 96 %   22 0959 -- -- -- (!) 157/83 96 %   22 0944 -- -- -- (!) 159/71 96 %   22 0929 -- -- -- (!) 178/86 98 %   22 0918 97.8 °F (36.6 °C) 91 20 (!) 166/93 100 %          Physical Exam  Vitals and nursing note reviewed. Constitutional:       Appearance: Normal appearance. HENT:      Head: Normocephalic and atraumatic. Eyes:      Extraocular Movements: Extraocular movements intact. Pupils: Pupils are equal, round, and reactive to light. Cardiovascular:      Rate and Rhythm: Normal rate and regular rhythm. Pulses: Normal pulses. Heart sounds: Normal heart sounds. Pulmonary:      Effort: Pulmonary effort is normal.      Breath sounds: Normal breath sounds. Abdominal:      General: Abdomen is flat. Palpations: Abdomen is soft. Tenderness: There is no abdominal tenderness. Musculoskeletal:         General: No tenderness. Cervical back: Normal range of motion and neck supple. No tenderness. Right lower le+ Pitting Edema present. Left lower le+ Pitting Edema present. Skin:     General: Skin is warm and dry. Neurological:      General: No focal deficit present. Mental Status: She is alert and oriented to person, place, and time. Cranial Nerves: No cranial nerve deficit. Sensory: No sensory deficit. Motor: No weakness.    Psychiatric:         Behavior: Behavior normal.        Orders Placed This Encounter   Procedures    XR CHEST PORTABLE    CT HEAD WO CONTRAST    CBC with Auto Differential    Basic Metabolic Panel    Troponin    Brain Natriuretic Peptide    Cardiac Monitor - ED Only    Continuous Pulse Oximetry    EKG 12 Lead    Saline lock IV       Procedures    ED EKG Interpretation  EKG was interpreted in the absence of a cardiologist.    Rate: Rate: Normal  EKG Interpretation: EKG Interpretation: sinus rhythm  ST Segments: Normal ST segments - NO STEMI  No acute changes    Results Include:    Recent Results (from the past 24 hour(s))   CBC with Auto Differential    Collection Time: 09/30/22  9:45 AM   Result Value Ref Range    WBC 10.7 4.3 - 11.1 K/uL    RBC 4.34 4.05 - 5.2 M/uL    Hemoglobin 11.9 11.7 - 15.4 g/dL    Hematocrit 35.9 35.8 - 46.3 %    MCV 82.7 79.6 - 97.8 FL    MCH 27.4 26.1 - 32.9 PG    MCHC 33.1 31.4 - 35.0 g/dL    RDW 14.1 11.9 - 14.6 %    Platelets 987 879 - 731 K/uL    MPV 10.4 9.4 - 12.3 FL    nRBC 0.00 0.0 - 0.2 K/uL    Differential Type AUTOMATED      Seg Neutrophils 60 43 - 78 %    Lymphocytes 32 13 - 44 %    Monocytes 6 4.0 - 12.0 %    Eosinophils % 0 (L) 0.5 - 7.8 %    Basophils 1 0.0 - 2.0 %    Immature Granulocytes 1 0.0 - 5.0 %    Segs Absolute 6.4 1.7 - 8.2 K/UL    Absolute Lymph # 3.4 0.5 - 4.6 K/UL    Absolute Mono # 0.7 0.1 - 1.3 K/UL    Absolute Eos # 0.0 0.0 - 0.8 K/UL    Basophils Absolute 0.1 0.0 - 0.2 K/UL    Absolute Immature Granulocyte 0.2 0.0 - 0.5 K/UL   Basic Metabolic Panel    Collection Time: 09/30/22  9:45 AM   Result Value Ref Range    Sodium 132 (L) 136 - 145 mmol/L    Potassium 4.0 3.5 - 5.1 mmol/L    Chloride 98 (L) 101 - 110 mmol/L    CO2 28 21 - 32 mmol/L    Anion Gap 6 4 - 13 mmol/L    Glucose 85 65 - 100 mg/dL    BUN 12 8 - 23 MG/DL    Creatinine 0.80 0.6 - 1.0 MG/DL    GFR African American >60 >60 ml/min/1.73m2    GFR Non- >60 >60 ml/min/1.73m2    Calcium 9.1 8.3 - 10.4 MG/DL   Troponin    Collection Time: 09/30/22  9:45 AM   Result Value Ref Range    Troponin, High Sensitivity 5.7 0 - 14 pg/mL   Brain Natriuretic Peptide    Collection Time: 09/30/22  9:45 AM   Result Value Ref Range    NT Pro- (H) 5 - 125 PG/ML          CT HEAD WO CONTRAST   Final Result   No acute intracranial abnormality evident by CT. XR CHEST PORTABLE   Final Result   No acute abnormality.                          ED Course as of 09/30/22 1447   Fri Sep 30, 2022   1207 Patient is resting comfortably in bed with blood pressure 160 over 80s on the monitor. Patient states that she is feeling much better. Her headache is mild currently. I explained the results and plan and she is comfortable with discharge. [CW]      ED Course User Index  [CW] Sherry Méndez MD       MDM  Number of Diagnoses or Management Options  Hypertension, unspecified type  Diagnosis management comments: Patient presents for evaluation of hypertension and headache since she accidentally stopped taking her blood pressure medicine. Patient's blood pressure was moderately elevated. Patient was given clonidine and came down a little bit and her headache improved. Patient's head CT showed no acute findings. Patient has a normal neurologic exam.  I do not suspect stroke, subarachnoid hemorrhage, CNS infection, or any other acute neurologic process. Patient's EKG showed normal sinus rhythm without any ischemic changes. Patient's troponin was normal and ACS was ruled out. Patient's chest x-ray showed no acute findings. Patient's BNP level is mildly elevated but there is no evidence of CHF clinically or on chest x-ray. I believe patient's headache is due to her hypertension. Patient has recently restarted her verapamil but is only been 2 days. I recommended that she continue to take it and keep a blood pressure journal.  I will prescribe clonidine instructed patient to take it if her systolic blood pressure is over 992 or her diastolic blood pressure is over 90. Patient was discharged home with primary care follow-up. Explanation: The diagnosis and plan as well as the results of any testing (if done) and treatments (if done) today in the emergency department were communicated to the patient and/or their family/caregiver (if present).   The patient/caregiver verbalized understanding and compliance with the treatment plan and reasons to return immediately to the emergency department. All questions were answered at bedside      ICD-10-CM    1. Hypertension, unspecified type  I10           DISPOSITION Decision To Discharge 09/30/2022 12:26:26 PM       Voice dictation software was used during the making of this note. This software is not perfect and grammatical and other typographical errors may be present. This note has not been completely proofread for errors.      Paola Brooks MD  09/30/22 3633

## 2022-10-14 ENCOUNTER — TELEPHONE (OUTPATIENT)
Dept: PULMONOLOGY | Age: 62
End: 2022-10-14

## 2022-10-14 NOTE — TELEPHONE ENCOUNTER
TRIAGE CALL      Complaint: Coughing/sore  throat  Cough: yes  Productive:  yes  Bloody Sputum:  no  Increased SOB/Wheezing:  yes  Duration: 1 months  Fever/Chills: no  OTC Meds tried: none

## 2022-10-14 NOTE — TELEPHONE ENCOUNTER
Last seen: 9/27/22 virtual  Hx: COPD    Last visit provided doxy & steroid taper for exacerbation. Call reporting coughing & sore throat x 1 month. Contacted patient regarding issue, notes she talked to the NP last month & acknowledged medication regimen helped some but by the end of that week had issues w/ b/p & had to go to ED; 10/3 saw PCP, prescribed another antibiotic \"amoxi-?\", had f/u visit today and continues to have coughing, was advised to be seen in office. STRINGER; asking for f/u appt, scheduled 10/17.

## 2022-10-16 NOTE — PROGRESS NOTES
Patient Name:  Tristan Shaffer                             YOB: 1960  MRN: [id number]                                              Office Visit 10/17/2022    ASSESSMENT AND PLAN:  (Medical Decision Making)    Rubina Zhu was seen today for cough. Diagnoses and all orders for this visit:    Chronic obstructive pulmonary disease, unspecified COPD type ([facility]  --recent exacerbation requiring steroids and antibiotics--nearing baseline. Continues to have some intermittent cough and wheezing. O2 sats stable. Continue use of albuterol in nebulizer 4 times daily until cough and wheezing return to baseline. With wheezing on exertion, suspect some element of dynamic airway collapse. Personal history of nicotine dependence  ---30 pack year history of smoking, quit in 2011. Last screening CT 8/1/2022 without any worrisome findings for lung cancer. Will need follow up scan after 8/1/2023--will arrange at next visit. Gastro-esophageal reflux disease without esophagitis  --ongoing symptoms despite being on omeprazole once daily. Reviewed GERD instructions with patient. Suspect uncontrolled GERD is contributing to her cough. Will add Pepcid 20 mg qhs. Oral candidiasis  --emphasized good mouth care after each use of Advair. Add Nystatin MW. Other orders  -     famotidine (PEPCID) 20 MG tablet; Take 1 tablet by mouth at bedtime  -     nystatin (MYCOSTATIN) 663269 UNIT/ML suspension; Take 5 mLs by mouth 4 times daily swish and swallow    Orders Placed This Encounter   Medications    famotidine (PEPCID) 20 MG tablet     Sig: Take 1 tablet by mouth at bedtime     Dispense:  30 tablet     Refill:  5    nystatin (MYCOSTATIN) [id number] UNIT/ML suspension     Sig: Take 5 mLs by mouth 4 times daily swish and swallow     Dispense:  300 mL     Refill:  0     No orders of the defined types were placed in this encounter. Follow-up and Dispositions    Return for per recall.          Collaborating physician is Dr. Tayler Lino, NP, APRN - CNP      _________________________________________________________________________    HISTORY OF PRESENT ILLNESS:    Ms. Jignesh Shankar is a 59 y.o. female who is seen at [facility] today for  Cough     The patient is a 59year old female who is seen as a work in appointment for complaints of cough. Has been sick for about a month. Was recently in ER for elevated blood pressure. Saw PCP 10/3/22 and 10/14/22. Was placed on antibiotics and steroids 9/27/22. Had another round of antibiotics after that. Cough is intermittent, usually with exertion. O2 sats have been 94-97% at home. Continues to have some STRINGER, not back to baseline yet. Has sore throat. No fever or chills. No sick contacts. REVIEW OF SYSTEMS: 10 point review of systems is negative except as reported in HPI. PHYSICAL EXAM: Body mass index is 41.88 kg/m². Vitals:    10/17/22 0820   BP: (!) 144/78   Pulse: 70   Resp: 17   Temp: 97.7 °F (36.5 °C)   SpO2: 94%   Weight: 244 lb (110.7 kg)   Height: 5' 4\" (1.626 m)         General:   Alert, cooperative, no distress, appears stated age. Eyes:   Conjunctivae/corneas clear. PERRL        Mouth/Throat:  Lips, mucosa, and tongue normal. Teeth and gums normal.        Lungs:     Breath sounds with wheezing on forced expiration only over trachea. Heart:   Regular rate and rhythm, S1, S2 normal, no murmur, click, rub or gallop. Abdomen:    Soft, non-tender. Extremities:  Extremities normal, atraumatic, no cyanosis or edema.      Skin:  Skin color normal. No rashes or lesions     Neurologic:  A&Ox3     DIAGNOSTIC TESTS:                                                                                    LABS:   Lab Results   Component Value Date/Time    WBC 10.7 09/30/2022 09:45 AM    HGB 11.9 09/30/2022 09:45 AM    HCT 35.9 09/30/2022 09:45 AM     09/30/2022 09:45 AM    NTPROBNP 414 09/30/2022 09:45 AM    ESR 19 03/31/2020 12:19 PM Imaging: I performed an independent interpretation of the patient's images. CXR:   XR CHEST PORTABLE 09/30/2022    Narrative  Portable chest x-ray    CLINICAL INDICATION: Hypertension    FINDINGS: Single AP view of the chest compared to a similar exam dated 8/18/2020  show the lungs to be expanded and clear. No pleural effusion or pneumothorax. The cardiac silhouette and mediastinum are unremarkable. The bones are normal.    Impression  No acute abnormality. CT Chest:   CT HEAD WO CONTRAST 09/30/2022    Narrative  EXAM: CT HEAD WITHOUT CONTRAST    INDICATION: weakness. COMPARISON: Head CT 2/21/2013    TECHNIQUE: Contiguous axial images were obtained from the skull base through the  vertex without IV contrast. Radiation dose reduction techniques were used for  this study. Our CT scanners use one or all of the following: Automated exposure  control, adjustment of the mA and/or kV according to patient size, iterative  reconstruction. FINDINGS:  Normal appearance of the brain parenchyma without evidence of acute infarction,  hemorrhage, or mass lesion. No hydrocephalus or midline shift. No extra-axial  mass or hemorrhage. The basal cisterns are patent. The visualized portions of the orbits are normal. The mastoid air cells and  paranasal sinuses are patent. The visualized vascular structures have an unremarkable noncontrast appearance. The calvarium and soft tissues appear normal.    Impression  No acute intracranial abnormality evident by CT.     Nuclear Medicine:   NM HEPATOBILIARY SCAN WITH PHARM AGENT     PFTs:   Office Spirometry Results Latest Ref Rng & Units 9/1/2022   FVC L 2.30   FEV1 L 1.72   FEV1 %PRED-PRE % 72%   FVC %PRED-PRE % 74%   FEV1/FVC % 75%       PMH Reference Info:                                                                                                                  Past Medical History:   Diagnosis Date    Allergic rhinitis 4/3/2014    Anxiety 5/30/2014    Asthma 2014    Circadian rhythm disorder     COPD (chronic obstructive pulmonary disease) ([facility] [address] 4/3/2014    DDD (degenerative disc disease) 4/3/2014    Depression, major, recurrent ([facility] [address] 4/3/2014    controlled with meds     GERD (gastroesophageal reflux disease) 4/3/2014    controlled with meds     HLD (hyperlipidemia) 4/3/2014    Hyposmolality and/or hyponatremia 4/3/2014    Hypothyroidism 2014    controlled with medication     Leg edema     Neurogenic bladder     self cath 4 times daily, sees Dr. Eleazar Daniel [facility]    Obesity 4/3/2014    Sleep apnea 5/10/2014    CPAP machine at      Unspecified adverse effect of anesthesia     difficult to wake up     Vitamin D deficiency 4/3/2014    Xerostomia 4/3/2014        Tobacco Use      Smoking status: Former        Packs/day: 1.00        Years: 30.00        Pack years: 30        Types: Cigarettes        Quit date: 2011        Years since quittin.6      Smokeless tobacco: Never    Allergies   Allergen Reactions    Oxycodone Nausea And Vomiting and Swelling     Pt states having great swelling with Oxycodone     Current Outpatient Medications   Medication Instructions    albuterol (PROVENTIL) 2.5 mg, Inhalation, EVERY 4 HOURS PRN    albuterol sulfate HFA (PROVENTIL;VENTOLIN;PROAIR) 108 (90 Base) MCG/ACT inhaler 2 puffs, Inhalation, EVERY 4 HOURS PRN    amitriptyline (ELAVIL) 25 mg, Oral    ARIPiprazole (ABILIFY) 15 mg, Oral, DAILY    baclofen (LIORESAL) 10 mg, Oral, 2 TIMES DAILY    calcium carbonate-vitamin D (CALTRATE) 600-400 MG-UNIT TABS per tab 1 tablet, Oral, DAILY    CPAP Machine MISC Does not apply    divalproex (DEPAKOTE) 125 mg, Oral, 2 TIMES DAILY    escitalopram (LEXAPRO) 10 mg, Oral, DAILY    famotidine (PEPCID) 20 mg, Oral, Nightly    fluticasone-salmeterol (ADVAIR DISKUS) 100-50 MCG/ACT AEPB diskus inhaler 1 puff, Inhalation, 2 TIMES DAILY    gabapentin (NEURONTIN) 300 MG capsule No dose, route, or frequency recorded.     levothyroxine (SYNTHROID) 137 MCG tablet Take 1 tablet by mouth once daily BEFORE BREAKFAST    liothyronine (CYTOMEL) 5 mcg, Oral, DAILY    magnesium oxide (MAG-OX) 400 mg, Oral, DAILY    montelukast (SINGULAIR) 10 MG tablet No dose, route, or frequency recorded.     nystatin (MYCOSTATIN) 500,000 Units, Oral, 4 TIMES DAILY, swish and swallow    omeprazole (PRILOSEC) 40 mg, Oral, DAILY    pantoprazole (PROTONIX) 40 mg, Oral, 2 TIMES DAILY    raloxifene (EVISTA) 60 mg, Oral, DAILY    tiotropium (SPIRIVA RESPIMAT) 2.5 MCG/ACT AERS inhaler 2 puffs, Inhalation, DAILY    verapamil (CALAN) 80 MG tablet Oral, DAILY    vitamin D 4,000 Units, Oral, DAILY

## 2022-10-17 ENCOUNTER — TELEPHONE (OUTPATIENT)
Dept: PULMONOLOGY | Age: 62
End: 2022-10-17

## 2022-10-17 ENCOUNTER — OFFICE VISIT (OUTPATIENT)
Dept: PULMONOLOGY | Age: 62
End: 2022-10-17
Payer: MEDICARE

## 2022-10-17 VITALS
BODY MASS INDEX: 41.66 KG/M2 | RESPIRATION RATE: 17 BRPM | WEIGHT: 244 LBS | OXYGEN SATURATION: 94 % | HEIGHT: 64 IN | SYSTOLIC BLOOD PRESSURE: 144 MMHG | TEMPERATURE: 97.7 F | DIASTOLIC BLOOD PRESSURE: 78 MMHG | HEART RATE: 70 BPM

## 2022-10-17 DIAGNOSIS — B37.0 ORAL CANDIDIASIS: ICD-10-CM

## 2022-10-17 DIAGNOSIS — Z87.891 PERSONAL HISTORY OF NICOTINE DEPENDENCE: ICD-10-CM

## 2022-10-17 DIAGNOSIS — J44.9 CHRONIC OBSTRUCTIVE PULMONARY DISEASE, UNSPECIFIED COPD TYPE (HCC): Primary | ICD-10-CM

## 2022-10-17 DIAGNOSIS — K21.9 GASTRO-ESOPHAGEAL REFLUX DISEASE WITHOUT ESOPHAGITIS: ICD-10-CM

## 2022-10-17 DIAGNOSIS — Z01.811 PRE-OP CHEST EXAM: Primary | ICD-10-CM

## 2022-10-17 PROCEDURE — 99214 OFFICE O/P EST MOD 30 MIN: CPT | Performed by: NURSE PRACTITIONER

## 2022-10-17 PROCEDURE — 1036F TOBACCO NON-USER: CPT | Performed by: NURSE PRACTITIONER

## 2022-10-17 PROCEDURE — G8482 FLU IMMUNIZE ORDER/ADMIN: HCPCS | Performed by: NURSE PRACTITIONER

## 2022-10-17 PROCEDURE — 3017F COLORECTAL CA SCREEN DOC REV: CPT | Performed by: NURSE PRACTITIONER

## 2022-10-17 PROCEDURE — G8417 CALC BMI ABV UP PARAM F/U: HCPCS | Performed by: NURSE PRACTITIONER

## 2022-10-17 PROCEDURE — 3023F SPIROM DOC REV: CPT | Performed by: NURSE PRACTITIONER

## 2022-10-17 PROCEDURE — G8427 DOCREV CUR MEDS BY ELIG CLIN: HCPCS | Performed by: NURSE PRACTITIONER

## 2022-10-17 RX ORDER — FAMOTIDINE 20 MG/1
20 TABLET, FILM COATED ORAL NIGHTLY
Qty: 30 TABLET | Refills: 5 | Status: SHIPPED | OUTPATIENT
Start: 2022-10-17

## 2022-10-17 RX ORDER — OMEPRAZOLE 40 MG/1
40 CAPSULE, DELAYED RELEASE ORAL DAILY
COMMUNITY
Start: 2022-10-14 | End: 2023-01-12

## 2022-10-17 RX ORDER — MONTELUKAST SODIUM 10 MG/1
TABLET ORAL
COMMUNITY
Start: 2022-10-14

## 2022-10-17 NOTE — TELEPHONE ENCOUNTER
Received fax from [address] [place] [address] requesting Surgical Clearance.     SURGICAL OR PROCEDURAL PULMONARY RISK ASSESSMENT:    Physician or Practice Requesting Clearance: Shayla Bonds  : Alis Beltran Phone: [phone]  Fax number: [phone]  Date of Surgery/Procedure: 12/8/22  Type of Surgery or Procedure: Right Knee Replacement  Type of Anesthesia: Spinal  Anticipated Duration of Surgery: 1 hr

## 2022-10-17 NOTE — PATIENT INSTRUCTIONS
Rinse mouth after each use of Advair. Get new toothbrush    Use nystatin mouthwash 1 teaspoon swish and spit 4 times daily. GERD instructions  1. Limit caffeine and alcohol consumption to one serving daily. May need to eliminate totally if symptoms do not resolve. 2.  Avoid known triggers. 3.  Elevate head of bed on 6 inch blocks. 4.  Avoid eating and drinking within 2 hours of bedtime. 5.  Avoid spicy and fried foods. Take omeprazole (Prilosec) in AM  Take famotidine (Pepcid) at bedtime.

## 2022-11-08 ENCOUNTER — HOSPITAL ENCOUNTER (OUTPATIENT)
Dept: GENERAL RADIOLOGY | Age: 62
Discharge: HOME | End: 2022-11-11
Payer: MEDICARE

## 2022-11-08 ENCOUNTER — OFFICE VISIT (OUTPATIENT)
Dept: PULMONOLOGY | Age: 62
End: 2022-11-08
Payer: MEDICARE

## 2022-11-08 VITALS
HEART RATE: 70 BPM | OXYGEN SATURATION: 100 % | RESPIRATION RATE: 18 BRPM | DIASTOLIC BLOOD PRESSURE: 74 MMHG | HEIGHT: 64 IN | BODY MASS INDEX: 40.58 KG/M2 | SYSTOLIC BLOOD PRESSURE: 118 MMHG | TEMPERATURE: 96.9 F | WEIGHT: 237.7 LBS

## 2022-11-08 DIAGNOSIS — J44.9 CHRONIC OBSTRUCTIVE PULMONARY DISEASE, UNSPECIFIED COPD TYPE (HCC): Primary | ICD-10-CM

## 2022-11-08 DIAGNOSIS — G47.33 OBSTRUCTIVE SLEEP APNEA (ADULT) (PEDIATRIC): ICD-10-CM

## 2022-11-08 DIAGNOSIS — J44.9 CHRONIC OBSTRUCTIVE PULMONARY DISEASE, UNSPECIFIED COPD TYPE (HCC): ICD-10-CM

## 2022-11-08 DIAGNOSIS — Z01.811 PRE-OP CHEST EXAM: ICD-10-CM

## 2022-11-08 DIAGNOSIS — M25.561 RIGHT KNEE PAIN, UNSPECIFIED CHRONICITY: ICD-10-CM

## 2022-11-08 DIAGNOSIS — J98.4 RESTRICTIVE LUNG DISEASE: ICD-10-CM

## 2022-11-08 DIAGNOSIS — Z01.811 PREOP RESPIRATORY EXAM: Primary | ICD-10-CM

## 2022-11-08 DIAGNOSIS — Z01.818 PRE-OP EXAM: ICD-10-CM

## 2022-11-08 LAB
EXPIRATORY TIME: NORMAL
FEF 25-75% %PRED-PRE: NORMAL
FEF 25-75% PRED: NORMAL
FEF 25-75%-PRE: NORMAL
FEV1 %PRED-PRE: 75 %
FEV1 PRED: NORMAL
FEV1/FVC %PRED-PRE: NORMAL
FEV1/FVC PRED: NORMAL
FEV1/FVC: 79 %
FEV1: 1.83 L
FVC %PRED-PRE: 72 %
FVC PRED: NORMAL
FVC: 2.31 L
PEF %PRED-PRE: NORMAL
PEF PRED: NORMAL
PEF-PRE: NORMAL

## 2022-11-08 PROCEDURE — 99214 OFFICE O/P EST MOD 30 MIN: CPT | Performed by: NURSE PRACTITIONER

## 2022-11-08 PROCEDURE — G8482 FLU IMMUNIZE ORDER/ADMIN: HCPCS | Performed by: NURSE PRACTITIONER

## 2022-11-08 PROCEDURE — 94010 BREATHING CAPACITY TEST: CPT | Performed by: INTERNAL MEDICINE

## 2022-11-08 PROCEDURE — G8417 CALC BMI ABV UP PARAM F/U: HCPCS | Performed by: NURSE PRACTITIONER

## 2022-11-08 PROCEDURE — G8427 DOCREV CUR MEDS BY ELIG CLIN: HCPCS | Performed by: NURSE PRACTITIONER

## 2022-11-08 PROCEDURE — 3017F COLORECTAL CA SCREEN DOC REV: CPT | Performed by: NURSE PRACTITIONER

## 2022-11-08 PROCEDURE — 3023F SPIROM DOC REV: CPT | Performed by: NURSE PRACTITIONER

## 2022-11-08 PROCEDURE — 71046 X-RAY EXAM CHEST 2 VIEWS: CPT

## 2022-11-08 PROCEDURE — 1036F TOBACCO NON-USER: CPT | Performed by: NURSE PRACTITIONER

## 2022-11-08 RX ORDER — TRIAMTERENE AND HYDROCHLOROTHIAZIDE 37.5; 25 MG/1; MG/1
1 CAPSULE ORAL 2 TIMES DAILY
COMMUNITY
Start: 2022-10-14 | End: 2023-01-12

## 2022-11-08 ASSESSMENT — PULMONARY FUNCTION TESTS
FEV1: 1.83
FVC: 2.31
FEV1/FVC: 79
FVC_PERCENT_PREDICTED_PRE: 72
FEV1_PERCENT_PREDICTED_PRE: 75

## 2022-11-08 NOTE — PROGRESS NOTES
Patient Name:  Martha Garcia                             YOB: 1960  MRN: [id number]                                              Office Visit 11/8/2022    ASSESSMENT AND PLAN:  (Medical Decision Making)      Dominic Taylor was seen today for pre-op exam.    Diagnoses and all orders for this visit:    Preop respiratory exam  --patient is considered low risk for chacha-/post-operative pulmonary complications. This is not prohibitive to her having surgery. The preventive strategies for pulmonary complications are included below. PREOP:  1. Smoking cessation:  Quit in 2011  2. Optimize inhaler regimen:  Continue Advair and Spirivia. 3. Treat for COPD or asthma flare with Prednisone or delay elective surgery:   N/A  4. Delay surgery for active respiratory infection and do not use \"preventive antibiotics\" for respiratory infection: N/A  5. Preop IS, breathing exercises, and ambulation as able. INTRAOP:  1. Less invasive (laproscopic) and short procedure (<3 hours) when possible  2. Nerve block rather than general anesthesia when possible  3. Administer Albuterol 2-4 puffs 30 minutes prior to intubation  4. Avoid neuromuscular blockade when possible     POSTOP:  1. Post op IS, breathing exercise and ambulation as soon as able  2. Epidural anesthesia instead of opioids when able  3. Avoid NSAIDS in patients with asthma sensitive to NSAIDS  4. Wear CPAP in recovery. 5. Avoid NGT after abdominal surgery when not needed for GI symptom control       -     Spirometry Without Bronchodilator    Right knee pain, unspecified chronicity  --scheduled for knee surgery 12/8/22    Chronic obstructive pulmonary disease, unspecified COPD type ([facility] [address]  --spirometry remains restrictive, but has obstruction on flow volume loop. Needs to have CPFTs with pre-/post BD testing to further define. Has significant smoking history and thus the presumptive diagnosis is COPD. Restrictive lung disease  --unchanged.   Obesity is likely contributing to this. CPFTs as above. Obstructive sleep apnea (adult) (pediatric)  --remains on CPAP. Needs to take CPAP with her to hospital and needs to wear in recovery. Follow-up and Dispositions    Return for per recall. Collaborating physician is Dr. Doris Chawla. SERAFIN Quintana Sis, NP, APRN - CNP    _________________________________________________________________________    HISTORY OF PRESENT ILLNESS:    Ms. Kendall Sandoval is a 57 y.o. female who is seen at [facility] today for  Pre-op Exam     The patient is a 57year old female who is seen for pre-op respiratory assessment at the request of Dr. Kelvin Austin. Patient is scheduled for right knee replacement at at [facility] on 112/8/22. Anticipate using spinal anesthesia and length of anesthesia is approximately 1 hour. Patient has a history of  presumed COPD, COLUMBA, and morbid obesity. She remains on CPAP and is followed in our sleep clinic. She has a 30 pack year history of cigarette smoking and has been smoke free since 2011. She had an exacerbation in late September and mid October requiring steroids. Wheezing has resolved. Has intermittent productive cough of clear secretions. She is using albuterol < 1 time per week. Her knee pain limits her activity. REVIEW OF SYSTEMS: 10 point review of systems is negative except as reported in HPI. PHYSICAL EXAM: Body mass index is 40.8 kg/m². Vitals:    11/08/22 1018   BP: 118/74   Pulse: 70   Resp: 18   Temp: 96.9 °F (36.1 °C)   TempSrc: Skin   SpO2: 100%   Weight: 237 lb 11.2 oz (107.8 kg)   Height: 5' 4\" (1.626 m)         General:   Alert, cooperative, no distress, appears stated age. Eyes:   Conjunctivae/corneas clear. PERRL        Mouth/Throat:  Lips, mucosa, and tongue normal. Teeth and gums normal.        Lungs:     Breath sounds clear. Heart:   Regular rate and rhythm, S1, S2 normal, no murmur, click, rub or gallop. Abdomen:    Soft, non-tender. Extremities:  Extremities normal, atraumatic, no cyanosis or edema. Skin:  Skin color normal. No rashes or lesions     Neurologic:  A&Ox3     DIAGNOSTIC TESTS:                                                                                    LABS:   Lab Results   Component Value Date/Time    WBC 10.7 09/30/2022 09:45 AM    HGB 11.9 09/30/2022 09:45 AM    HCT 35.9 09/30/2022 09:45 AM     09/30/2022 09:45 AM    NTPROBNP 414 09/30/2022 09:45 AM    ESR 19 03/31/2020 12:19 PM     Imaging: I performed an independent interpretation of the patient's images. CXR:   XR CHEST STANDARD TWO VW 11/08/2022    Narrative  PA AND LATERAL CHEST X-RAY. Clinical Indication: Preoperative chest x-ray    Comparison: Chest x-ray dated 9/30/2022    Findings: 2 views of the chest submitted demonstrate the cardiac silhouette and  mediastinum to be unremarkable. There is no pleural effusion or pneumothorax. The lung parenchyma is clear. The included osseous structures are unremarkable. Impression  Normal chest x-ray. CT Chest:   CT HEAD WO CONTRAST 09/30/2022    Narrative  EXAM: CT HEAD WITHOUT CONTRAST    INDICATION: weakness. COMPARISON: Head CT 2/21/2013    TECHNIQUE: Contiguous axial images were obtained from the skull base through the  vertex without IV contrast. Radiation dose reduction techniques were used for  this study. Our CT scanners use one or all of the following: Automated exposure  control, adjustment of the mA and/or kV according to patient size, iterative  reconstruction. FINDINGS:  Normal appearance of the brain parenchyma without evidence of acute infarction,  hemorrhage, or mass lesion. No hydrocephalus or midline shift. No extra-axial  mass or hemorrhage. The basal cisterns are patent. The visualized portions of the orbits are normal. The mastoid air cells and  paranasal sinuses are patent. The visualized vascular structures have an unremarkable noncontrast appearance.     The calvarium and soft tissues appear normal.    Impression  No acute intracranial abnormality evident by CT. Nuclear Medicine:   NM HEPATOBILIARY SCAN WITH PHARM AGENT     PFTs:   Office Spirometry Results Latest Ref Rng & Units 2022   FVC L 2.31 2.30   FEV1 L 1.83 1.72   FEV1 %PRED-PRE % 75 72%   FVC %PRED-PRE % 72 74%   FEV1/FVC % 79 75%       Exercise Oximetry:   O2 sat on room air at rest is 97% and resting heart rate is 88 bpm.  After walking for 4 minutes, lowest O2 sat is 95% and maximum heart rate was 95 bpm.    Echo: No results found for this or any previous visit from the past 3650 days.     PMH Reference Info:                                                                                                                  Past Medical History:   Diagnosis Date    Allergic rhinitis 4/3/2014    Anxiety 2014    Asthma 2014    Circadian rhythm disorder     COPD (chronic obstructive pulmonary disease) ([facility] [address] 4/3/2014    DDD (degenerative disc disease) 4/3/2014    Depression, major, recurrent ([facility] [address] 4/3/2014    controlled with meds     GERD (gastroesophageal reflux disease) 4/3/2014    controlled with meds     HLD (hyperlipidemia) 4/3/2014    Hyposmolality and/or hyponatremia 4/3/2014    Hypothyroidism 2014    controlled with medication     Leg edema     Neurogenic bladder     self cath 4 times daily, sees Dr. Jose Holm PGU    Obesity 4/3/2014    Sleep apnea 5/10/2014    CPAP machine at      Unspecified adverse effect of anesthesia     difficult to wake up     Vitamin D deficiency 4/3/2014    Xerostomia 4/3/2014        Tobacco Use      Smoking status: Former        Packs/day: 1.00        Years: 30.00        Pack years: 30        Types: Cigarettes        Quit date: 2011        Years since quittin.7      Smokeless tobacco: Never    Allergies   Allergen Reactions    Oxycodone Nausea And Vomiting and Swelling     Pt states having great swelling with Oxycodone     Current Outpatient Medications   Medication Instructions    albuterol (PROVENTIL) 2.5 mg, Inhalation, EVERY 4 HOURS PRN    albuterol sulfate HFA (PROVENTIL;VENTOLIN;PROAIR) 108 (90 Base) MCG/ACT inhaler 2 puffs, Inhalation, EVERY 4 HOURS PRN    amitriptyline (ELAVIL) 25 mg, Oral    ARIPiprazole (ABILIFY) 15 mg, Oral, DAILY    baclofen (LIORESAL) 10 mg, Oral, 2 TIMES DAILY    calcium carbonate-vitamin D (CALTRATE) 600-400 MG-UNIT TABS per tab 1 tablet, Oral, DAILY    CPAP Machine MISC Does not apply    divalproex (DEPAKOTE) 125 mg, Oral, 2 TIMES DAILY    escitalopram (LEXAPRO) 10 mg, Oral, DAILY    famotidine (PEPCID) 20 mg, Oral, Nightly    fluticasone-salmeterol (ADVAIR DISKUS) 100-50 MCG/ACT AEPB diskus inhaler 1 puff, Inhalation, 2 TIMES DAILY    levothyroxine (SYNTHROID) 137 MCG tablet Take 1 tablet by mouth once daily BEFORE BREAKFAST    liothyronine (CYTOMEL) 5 mcg, Oral, DAILY    magnesium oxide (MAG-OX) 400 mg, Oral, DAILY    montelukast (SINGULAIR) 10 MG tablet No dose, route, or frequency recorded.     nystatin (MYCOSTATIN) 500,000 Units, Oral, 4 TIMES DAILY, swish and swallow    omeprazole (PRILOSEC) 40 mg, Oral, DAILY    pantoprazole (PROTONIX) 40 mg, Oral, 2 TIMES DAILY    raloxifene (EVISTA) 60 mg, Oral, DAILY    tiotropium (SPIRIVA RESPIMAT) 2.5 MCG/ACT AERS inhaler 2 puffs, Inhalation, DAILY    triamterene-hydroCHLOROthiazide (DYAZIDE) 37.5-25 MG per capsule 1 capsule, Oral, 2 TIMES DAILY    verapamil (CALAN) 80 MG tablet Oral, DAILY    vitamin D 4,000 Units, Oral, DAILY       ARISCAT (Canet) risk index:  Independent predictors of postoperative pulmonary complications    FACTOR Risk Score Score   Age, years          <=50 0 0        51-80 3 3        >80 16 0   Preoperative O2 saturations          >=96% 0 0        91-95% 8 0        <=90% 24 0   Respiratory infection in past month 17 17   Preoperative anemia--Hgb <=10 11 0   Surgical incision          Upper abdominal 15 0        Intrathoracic 24 0   Duration of surgery          <=2 hours 0 0        2-3 hours 16 0        >3 hours 23 0        Emergency surgery 8 0     Total=20     Risk Class  Low <26 points   Intermediate 26-44 points   High >=45 points

## 2022-11-09 ENCOUNTER — HOSPITAL ENCOUNTER (OUTPATIENT)
Dept: PULMONOLOGY | Age: 62
Discharge: HOME | End: 2022-11-12
Payer: MEDICARE

## 2022-11-09 DIAGNOSIS — J44.9 CHRONIC OBSTRUCTIVE PULMONARY DISEASE, UNSPECIFIED COPD TYPE (HCC): ICD-10-CM

## 2022-11-09 PROCEDURE — 94726 PLETHYSMOGRAPHY LUNG VOLUMES: CPT

## 2022-11-09 PROCEDURE — 94729 DIFFUSING CAPACITY: CPT

## 2022-11-09 PROCEDURE — 94060 EVALUATION OF WHEEZING: CPT

## 2023-03-01 NOTE — PROGRESS NOTES
Name:  Kimberly Anderson  YOB: 1960   MRN: [id number]      Office Visit: 3/2/2023        ASSESSMENT AND PLAN:  (Medical Decision Making)    Impression:     1. COPD, mild (HCC)  -- Mild obstruction on complete pulmonary function tests. She is maintained on Advair twice daily and Spiriva once daily. She has as needed albuterol which she uses 1-2 times daily. We will continue her on this current therapy. - albuterol (PROVENTIL) (2.5 MG/3ML) 0.083% nebulizer solution; Take 3 mLs by nebulization every 4 hours as needed for Wheezing or Shortness of Breath Copd dx code j44.9  Dispense: 120 each; Refill: 11    2. Personal history of tobacco use  --Discussed with patient current guidelines for screening for lung cancer. Current recommendations are to obtain yearly screening LDCT yearly from age 49-80, or until smoke free for 15 years. Patient has 30 pack year history of cigarette smoking and currently smoke free since 2011. Discussed with patient risks and benefits of screening, including over-diagnosis, false positive rate, and total radiation exposure. Patient currently exhibits no signs or symptoms suggestive of lung cancer. Discussed with patient importance of compliance with yearly annual lung cancer screenings and willingness to undergo diagnosis and treatment if screening scan is positive. In addition, patient was counseled regarding remaining smoke free. This would be due after August 1, 2023.    - [place] VISIT TO DISCUSS LUNG CA SCREEN W LDCT  - CT Lung Screen (Annual/Baseline); Future    3. Gastro-esophageal reflux disease without esophagitis  --Controlled with Pepcid and Protonix. Continue ongoing lifestyle changes. 4. Morbid (severe) obesity due to excess calories [facility])  --Patient is doing well since her knee replacement surgery in November. She has decreased pain. She has joined [employer] and is going 3 times per week. Weight loss is advised.   Discussed portion control and increased activity level. Sugar and carbohydrate restriction discussed. Recommended lean protein emphasis in diet, with elimination of sugared beverages including soda pop and juices. Decrease carbs such as pasta, rice, cereal, bread, potatoes emphasized. Whole wheat and multigrain to replace all processed flour. Suggested "[employer]" Diet as a guideline. 5.  COLUMBA  -- Difficulty tolerating CPAP mask. We will arrange for follow-up in sleep clinic. Orders Placed This Encounter   Medications    albuterol (PROVENTIL) (2.5 MG/3ML) 0.083% nebulizer solution     Sig: Take 3 mLs by nebulization every 4 hours as needed for Wheezing or Shortness of Breath Copd dx code j44.9     Dispense:  120 each     Refill:  11         Procedures    ME VISIT TO DISCUSS LUNG CA SCREEN W LDCT     Follow-up and Dispositions    Return in about 6 months (around 9/2/2023) for July, COPD, spirometry. Collaborating physician is Dr. Tanner Gillette. ADS    LUIS FERNANDO Velasco CNP      _________________________________________________________________________    HISTORY OF PRESENT ILLNESS:    Ms. Anna De Guzman is a 57 y.o. female who is seen at [facility] for  COPD     The patient is a 57year old female who is seen for follow up of COPD. She has a 30-pack-year history of cigarette smoking, but quit smoking in 2011. She had a screening CT scan in August, 2022 which did not reveal any worrisome findings for lung cancer. She remains on Advair and Spiriva. She uses albuterol 1-2 times daily. She denies any exacerbations or hospitalizations for her COPD since last visit. Had right knee replacement at [facility] on 11/28/22. Her pain is nearly gone. This has improved her mobility. She has joined [employer] and is trying to go 3 times a week. She is working on her weight. She is having trouble tolerating her CPAP and wishes to follow-up in the sleep clinic. Was last seen there in April, 2021.     REVIEW OF SYSTEMS: 10 point review of systems is negative except as reported in HPI. PHYSICAL EXAM: Body mass index is 41.02 kg/m². Vitals:    03/02/23 0932   BP: 138/76   Pulse: 77   Resp: 18   Temp: 97 °F (36.1 °C)   TempSrc: Skin   SpO2: 97%   Weight: 239 lb (108.4 kg)   Height: 5' 4\" (1.626 m)         General:   Alert, cooperative, no distress, appears stated age. Eyes:   Conjunctivae/corneas clear. PERRL        Mouth/Throat:  Lips, mucosa, and tongue normal. Teeth and gums normal.        Lungs:     Breath sounds minimally decreased bilaterally, but clear. Heart:   Regular rate and rhythm, S1, S2 normal, no murmur, click, rub or gallop. Abdomen:    Soft, non-tender. Extremities:  Extremities normal, atraumatic, no cyanosis or edema. Skin:  Skin color normal. No rashes or lesions     Neurologic:  A&Ox3     DIAGNOSTIC TESTS:                                                                                    LABS:   Lab Results   Component Value Date/Time    WBC 10.7 09/30/2022 09:45 AM    HGB 11.9 09/30/2022 09:45 AM    HCT 35.9 09/30/2022 09:45 AM     09/30/2022 09:45 AM    NTPROBNP 414 09/30/2022 09:45 AM    ESR 19 03/31/2020 12:19 PM     Imaging: I performed an independent interpretation of the patient's images. CXR:   XR CHEST STANDARD TWO VW 11/08/2022    Narrative  PA AND LATERAL CHEST X-RAY. Clinical Indication: Preoperative chest x-ray    Comparison: Chest x-ray dated 9/30/2022    Findings: 2 views of the chest submitted demonstrate the cardiac silhouette and  mediastinum to be unremarkable. There is no pleural effusion or pneumothorax. The lung parenchyma is clear. The included osseous structures are unremarkable. Impression  Normal chest x-ray. CT Chest:   CT HEAD WO CONTRAST 09/30/2022    Narrative  EXAM: CT HEAD WITHOUT CONTRAST    INDICATION: weakness.     COMPARISON: Head CT 2/21/2013    TECHNIQUE: Contiguous axial images were obtained from the skull base through the  vertex without IV contrast. Radiation dose reduction techniques were used for  this study. Our CT scanners use one or all of the following: Automated exposure  control, adjustment of the mA and/or kV according to patient size, iterative  reconstruction. FINDINGS:  Normal appearance of the brain parenchyma without evidence of acute infarction,  hemorrhage, or mass lesion. No hydrocephalus or midline shift. No extra-axial  mass or hemorrhage. The basal cisterns are patent. The visualized portions of the orbits are normal. The mastoid air cells and  paranasal sinuses are patent. The visualized vascular structures have an unremarkable noncontrast appearance. The calvarium and soft tissues appear normal.    Impression  No acute intracranial abnormality evident by CT.     Nuclear Medicine:   NM HEPATOBILIARY SCAN WITH PHARM AGENT     PFTs:   Office Spirometry Results Latest Ref Rng & Units 11/8/2022 9/1/2022   FVC L 2.31 2.30   FEV1 L 1.83 1.72   FEV1 %PRED-PRE % 75 72%   FVC %PRED-PRE % 72 74%   FEV1/FVC % 79 75%       PMH Reference Info:                                                                                                                  Past Medical History:   Diagnosis Date    Allergic rhinitis 4/3/2014    Anxiety 5/30/2014    Asthma 5/30/2014    Circadian rhythm disorder     COPD (chronic obstructive pulmonary disease) ([facility] [address] 4/3/2014    DDD (degenerative disc disease) 4/3/2014    Depression, major, recurrent ([address] 4/3/2014    controlled with meds     GERD (gastroesophageal reflux disease) 4/3/2014    controlled with meds     HLD (hyperlipidemia) 4/3/2014    Hyposmolality and/or hyponatremia 4/3/2014    Hypothyroidism 5/30/2014    controlled with medication     Leg edema     Neurogenic bladder 8/14    self cath 4 times daily, sees Dr. Chari Robles PGU    Obesity 4/3/2014    Sleep apnea 5/10/2014    CPAP machine at hs     Unspecified adverse effect of anesthesia     difficult to wake up     Vitamin D deficiency 4/3/2014    Xerostomia 4/3/2014        Tobacco Use      Smoking status: Former        Packs/day: 1.00        Years: 30.00        Pack years: 30        Types: Cigarettes        Quit date: 2011        Years since quittin.0      Smokeless tobacco: Never    Allergies   Allergen Reactions    Oxycodone Nausea And Vomiting and Swelling     Pt states having great swelling with Oxycodone     Current Outpatient Medications   Medication Instructions    albuterol (PROVENTIL) 2.5 mg, Nebulization, EVERY 4 HOURS PRN, Copd dx code j44.9    albuterol sulfate HFA (PROVENTIL;VENTOLIN;PROAIR) 108 (90 Base) MCG/ACT inhaler 2 puffs, Inhalation, EVERY 4 HOURS PRN    amitriptyline (ELAVIL) 25 mg, Oral    amLODIPine (NORVASC) 10 MG tablet No dose, route, or frequency recorded. ARIPiprazole (ABILIFY) 15 mg, Oral, DAILY    baclofen (LIORESAL) 10 mg, Oral, 2 TIMES DAILY    calcium carbonate-vitamin D (CALTRATE) 600-400 MG-UNIT TABS per tab 1 tablet, Oral, DAILY    carvedilol (COREG) 6.25 mg, Oral, 2 TIMES DAILY    CPAP Machine MISC Does not apply    divalproex (DEPAKOTE) 125 mg, Oral, 2 TIMES DAILY    escitalopram (LEXAPRO) 10 mg, Oral, DAILY    famotidine (PEPCID) 20 mg, Oral, Nightly    fluticasone-salmeterol (ADVAIR DISKUS) 100-50 MCG/ACT AEPB diskus inhaler 1 puff, Inhalation, 2 TIMES DAILY    levothyroxine (SYNTHROID) 137 MCG tablet Take 1 tablet by mouth once daily BEFORE BREAKFAST    liothyronine (CYTOMEL) 5 mcg, Oral, DAILY    magnesium oxide (MAG-OX) 400 mg, Oral, DAILY    montelukast (SINGULAIR) 10 MG tablet No dose, route, or frequency recorded.     nystatin (MYCOSTATIN) 500,000 Units, Oral, 4 TIMES DAILY, swish and swallow    omeprazole (PRILOSEC) 40 mg, Oral, DAILY    pantoprazole (PROTONIX) 40 mg, Oral, 2 TIMES DAILY    raloxifene (EVISTA) 60 mg, Oral, DAILY    tiotropium (SPIRIVA RESPIMAT) 2.5 MCG/ACT AERS inhaler 2 puffs, Inhalation, DAILY    triamterene-hydroCHLOROthiazide (DYAZIDE) 37.5-25 MG per capsule 1 capsule, Oral, 2 TIMES DAILY    verapamil (CALAN) 80 MG tablet DAILY    vitamin D 4,000 Units, Oral, DAILY

## 2023-03-02 ENCOUNTER — OFFICE VISIT (OUTPATIENT)
Dept: PULMONOLOGY | Age: 63
End: 2023-03-02
Payer: MEDICARE

## 2023-03-02 VITALS
HEART RATE: 77 BPM | DIASTOLIC BLOOD PRESSURE: 76 MMHG | WEIGHT: 239 LBS | SYSTOLIC BLOOD PRESSURE: 138 MMHG | OXYGEN SATURATION: 97 % | HEIGHT: 64 IN | BODY MASS INDEX: 40.8 KG/M2 | RESPIRATION RATE: 18 BRPM | TEMPERATURE: 97 F

## 2023-03-02 DIAGNOSIS — Z87.891 PERSONAL HISTORY OF TOBACCO USE: ICD-10-CM

## 2023-03-02 DIAGNOSIS — K21.9 GASTRO-ESOPHAGEAL REFLUX DISEASE WITHOUT ESOPHAGITIS: ICD-10-CM

## 2023-03-02 DIAGNOSIS — G47.33 OBSTRUCTIVE SLEEP APNEA (ADULT) (PEDIATRIC): ICD-10-CM

## 2023-03-02 DIAGNOSIS — J44.9 COPD, MILD (HCC): Primary | ICD-10-CM

## 2023-03-02 DIAGNOSIS — E66.01 MORBID (SEVERE) OBESITY DUE TO EXCESS CALORIES (HCC): ICD-10-CM

## 2023-03-02 PROCEDURE — 3023F SPIROM DOC REV: CPT | Performed by: NURSE PRACTITIONER

## 2023-03-02 PROCEDURE — 99214 OFFICE O/P EST MOD 30 MIN: CPT | Performed by: NURSE PRACTITIONER

## 2023-03-02 PROCEDURE — 3017F COLORECTAL CA SCREEN DOC REV: CPT | Performed by: NURSE PRACTITIONER

## 2023-03-02 PROCEDURE — G8417 CALC BMI ABV UP PARAM F/U: HCPCS | Performed by: NURSE PRACTITIONER

## 2023-03-02 PROCEDURE — G8427 DOCREV CUR MEDS BY ELIG CLIN: HCPCS | Performed by: NURSE PRACTITIONER

## 2023-03-02 PROCEDURE — G0296 VISIT TO DETERM LDCT ELIG: HCPCS | Performed by: NURSE PRACTITIONER

## 2023-03-02 PROCEDURE — G8482 FLU IMMUNIZE ORDER/ADMIN: HCPCS | Performed by: NURSE PRACTITIONER

## 2023-03-02 PROCEDURE — 1036F TOBACCO NON-USER: CPT | Performed by: NURSE PRACTITIONER

## 2023-03-02 RX ORDER — ALBUTEROL SULFATE 2.5 MG/3ML
2.5 SOLUTION RESPIRATORY (INHALATION) EVERY 4 HOURS PRN
Qty: 120 EACH | Refills: 11 | Status: SHIPPED | OUTPATIENT
Start: 2023-03-02

## 2023-03-02 RX ORDER — CARVEDILOL 6.25 MG/1
6.25 TABLET ORAL 2 TIMES DAILY
COMMUNITY
Start: 2023-02-17 | End: 2023-04-18

## 2023-03-02 RX ORDER — AMLODIPINE BESYLATE 10 MG/1
TABLET ORAL
COMMUNITY
Start: 2023-02-17

## 2023-03-02 NOTE — PATIENT INSTRUCTIONS
I have ordered screening CT due AFTER 8/1/2023. You should receive a call from scheduling within 2-3 business days. If you do not hear from them, please call [phone] to schedule your test.      Learning About Lung Cancer Screening  What is screening for lung cancer? Lung cancer screening is a way to find some lung cancers early, before a person has any symptoms of the cancer. Lung cancer screening may help those who have the highest risk for lung cancer--people age 48 and older who are or were heavy smokers. For most people, who aren't at increased risk, screening for lung cancer probably isn't helpful. Screening won't prevent cancer. And it may not find all lung cancers. Lung cancer screening may lower the risk of dying from lung cancer in a small number of people. How is it done? Lung cancer screening is done with a low-dose CT (computed tomography) scan. A CT scan uses X-rays, or radiation, to make detailed pictures of your body. Experts recommend that screening be done in medical centers that focus on finding and treating lung cancer. Who is screening recommended for? Lung cancer screening is recommended for people age 48 and older who are or were heavy smokers. That means people with a smoking history of at least 20 pack years. A pack year is a way to measure how heavy a smoker you are or were. To figure out your pack years, multiply how many packs a day on average (assuming 20 cigarettes per pack) you have smoked by how many years you have smoked. For example: If you smoked 1 pack a day for 20 years, that's 1 times 20. So you have a smoking history of 20 pack years. If you smoked 2 packs a day for 10 years, that's 2 times 10. So you have a smoking history of 20 pack years. Experts agree that screening is for people who have a high risk of lung cancer. But experts don't agree on what high risk means. Some say people age 48 or older with at least a 20-pack-year smoking history are high risk. Others say it's people age 54 or older with a 30-pack-year history. To see if you could benefit from screening, first find out if you are at high risk for lung cancer. Your doctor can help you decide your lung cancer risk. What are the risks of screening? CT screening for lung cancer isn't perfect. It can show an abnormal result when it turns out there wasn't any cancer. This is called a false-positive result. This means you may need more tests to make sure you don't have cancer. These tests can be harmful and cause a lot of worry. These tests may include more CT scans and invasive testing like a lung biopsy. In a biopsy, the doctor takes a sample of tissue from inside your lung so it can be looked at under a microscope. A biopsy is the only way to tell if you have lung cancer. If the biopsy finds cancer, you and your doctor will have to decide how or whether to treat it. Some lung cancers found on CT scans are harmless and would not have caused a problem if they had not been found through screening. But because doctors can't tell which ones will turn out to be harmless, most will be treated. This means that you may get treatment--including surgery, radiation, or chemotherapy--that you don't need. There is a risk of damage to cells or tissue from being exposed to radiation, including the small amounts used in CTs, X-rays, and other medical tests. Over time, exposure to radiation may cause cancer and other health problems. But in most cases, the risk of getting cancer from being exposed to small amounts of radiation is low. It's not a reason to avoid these tests for most people. What are the benefits of screening? Your scan may be normal (negative). For some people who are at higher risk, screening lowers the chance of dying of lung cancer. How much and how long you smoked helps to determine your risk level. Screening can find some cancers early, when treatment may be more likely to work.   What happens after screening? The results of your CT scan will be sent to your doctor. Someone from your care team will explain the results of your scan and answer any questions you may have. If you need any follow-up, he or she will help you understand what to do next. After a lung cancer screening, you can go back to your usual activities right away. A lung cancer screening test can't tell if you have lung cancer. If your results are positive, your doctor can't tell whether an abnormal finding is a harmless nodule, cancer, or something else without doing more tests. What can you do to help prevent lung cancer? Some lung cancers can't be prevented. But if you smoke, quitting smoking is the best step you can take to prevent lung cancer. If you want to quit, your doctor can recommend medicines or other ways to help. Follow-up care is a key part of your treatment and safety. Be sure to make and go to all appointments, and call your doctor if you are having problems. It's also a good idea to know your test results and keep a list of the medicines you take. Where can you learn more? Go to http://www.abebe.com/ and enter Q940 to learn more about \"Learning About Lung Cancer Screening. \"  Current as of: May 4, 2022               Content Version: 13.5  © 2006-2022 [employer]orporated. Care instructions adapted under license by [facility] ([facility]). If you have questions about a medical condition or this instruction, always ask your healthcare professional. [place] [address] any warranty or liability for your use of this information.

## 2023-05-08 NOTE — PROGRESS NOTES
SUMMARY:    -Continue CPAP but will adjust pressure from AutoSet 5-15 to now 7-15 with C-Flex 2  - Patient is compliant benefiting from CPAP  - Renew supplies and I did change her mask to an N20 mask. Hold off any additional mask for now    - Patient needs to lose weight by diet and exercise    -Weight loss was encouraged through the reduction of caloric intake as well as an increase in aerobic physical activity.      -Continue proper sleep hygiene. Did review [place] score, and compliance data today.    -Send the patient to cardiology for evaluation of bilateral leg swelling and dyspnea exertion would likely need stress testing-chemical      All questions are answered to the patient's satisfaction. The patient will call for further questions and concerns. Follow up at the [address] [place] [address] will be in 3 months to see how she is doing. Follow up will be with the patient's referring physician as had been previously scheduled. Grant Olivo MD    Over 50% of today's office visit was spent in face to face time reviewing test results, prognosis, importance of compliance, education about disease process, benefits of medications, instructions for management of acute flare-ups, and follow up plans. Electronically signed and dictated. Please note if there are errors this is  likely a result of the dictation software. No orders of the defined types were placed in this encounter. No orders of the defined types were placed in this encounter.

## 2023-05-09 ENCOUNTER — OFFICE VISIT (OUTPATIENT)
Dept: SLEEP MEDICINE | Age: 63
End: 2023-05-09
Payer: MEDICARE

## 2023-05-09 VITALS
OXYGEN SATURATION: 98 % | HEART RATE: 79 BPM | BODY MASS INDEX: 42.34 KG/M2 | HEIGHT: 64 IN | RESPIRATION RATE: 14 BRPM | DIASTOLIC BLOOD PRESSURE: 84 MMHG | WEIGHT: 248 LBS | SYSTOLIC BLOOD PRESSURE: 140 MMHG | TEMPERATURE: 97 F

## 2023-05-09 DIAGNOSIS — G47.33 OSA (OBSTRUCTIVE SLEEP APNEA): Primary | ICD-10-CM

## 2023-05-09 DIAGNOSIS — R09.02 HYPOXEMIA: ICD-10-CM

## 2023-05-09 DIAGNOSIS — Z78.9 DIFFICULTY USING CONTINUOUS POSITIVE AIRWAY PRESSURE (CPAP) NASAL MASK: ICD-10-CM

## 2023-05-09 DIAGNOSIS — G47.00 INSOMNIA, UNSPECIFIED TYPE: ICD-10-CM

## 2023-05-09 DIAGNOSIS — R06.09 DYSPNEA ON EXERTION: ICD-10-CM

## 2023-05-09 DIAGNOSIS — R22.43 LOCALIZED SWELLING OF BOTH LOWER LEGS: ICD-10-CM

## 2023-05-09 DIAGNOSIS — E66.01 MORBID OBESITY WITH BMI OF 40.0-44.9, ADULT (HCC): ICD-10-CM

## 2023-05-09 PROCEDURE — 3017F COLORECTAL CA SCREEN DOC REV: CPT | Performed by: INTERNAL MEDICINE

## 2023-05-09 PROCEDURE — 1036F TOBACCO NON-USER: CPT | Performed by: INTERNAL MEDICINE

## 2023-05-09 PROCEDURE — G8417 CALC BMI ABV UP PARAM F/U: HCPCS | Performed by: INTERNAL MEDICINE

## 2023-05-09 PROCEDURE — G8427 DOCREV CUR MEDS BY ELIG CLIN: HCPCS | Performed by: INTERNAL MEDICINE

## 2023-05-09 PROCEDURE — 99214 OFFICE O/P EST MOD 30 MIN: CPT | Performed by: INTERNAL MEDICINE

## 2023-05-09 ASSESSMENT — SLEEP AND FATIGUE QUESTIONNAIRES
ESS TOTAL SCORE: 9
HOW LIKELY ARE YOU TO NOD OFF OR FALL ASLEEP WHILE SITTING INACTIVE IN A PUBLIC PLACE: 0
HOW LIKELY ARE YOU TO NOD OFF OR FALL ASLEEP WHILE LYING DOWN TO REST IN THE AFTERNOON WHEN CIRCUMSTANCES PERMIT: 3
HOW LIKELY ARE YOU TO NOD OFF OR FALL ASLEEP WHILE WATCHING TV: 0
HOW LIKELY ARE YOU TO NOD OFF OR FALL ASLEEP WHILE SITTING AND READING: 3
HOW LIKELY ARE YOU TO NOD OFF OR FALL ASLEEP WHILE SITTING AND TALKING TO SOMEONE: 0
HOW LIKELY ARE YOU TO NOD OFF OR FALL ASLEEP IN A CAR, WHILE STOPPED FOR A FEW MINUTES IN TRAFFIC: 0
HOW LIKELY ARE YOU TO NOD OFF OR FALL ASLEEP WHEN YOU ARE A PASSENGER IN A CAR FOR AN HOUR WITHOUT A BREAK: 0
HOW LIKELY ARE YOU TO NOD OFF OR FALL ASLEEP WHILE SITTING QUIETLY AFTER LUNCH WITHOUT ALCOHOL: 3

## 2023-05-31 ENCOUNTER — TELEPHONE (OUTPATIENT)
Dept: SLEEP MEDICINE | Age: 63
End: 2023-05-31

## 2023-05-31 NOTE — TELEPHONE ENCOUNTER
Pt likes the mask Dr. Ethan Milan gave her. She asked for prescription to get more like it. Please send to [facility].

## 2023-06-27 ENCOUNTER — TELEPHONE (OUTPATIENT)
Dept: PULMONOLOGY | Age: 63
End: 2023-06-27

## 2023-06-27 NOTE — TELEPHONE ENCOUNTER
The patients machine is not working properly and she wants to know if this is going to be a problem for her. Per the patient she had ten episodes last and this concerns her.

## 2023-06-28 ENCOUNTER — INITIAL CONSULT (OUTPATIENT)
Age: 63
End: 2023-06-28
Payer: MEDICARE

## 2023-06-28 ENCOUNTER — TELEPHONE (OUTPATIENT)
Dept: SLEEP MEDICINE | Age: 63
End: 2023-06-28

## 2023-06-28 VITALS
BODY MASS INDEX: 42.68 KG/M2 | DIASTOLIC BLOOD PRESSURE: 78 MMHG | HEART RATE: 68 BPM | WEIGHT: 250 LBS | HEIGHT: 64 IN | SYSTOLIC BLOOD PRESSURE: 140 MMHG

## 2023-06-28 DIAGNOSIS — G47.33 OSA (OBSTRUCTIVE SLEEP APNEA): ICD-10-CM

## 2023-06-28 DIAGNOSIS — R06.09 DYSPNEA ON EXERTION: ICD-10-CM

## 2023-06-28 DIAGNOSIS — E66.01 MORBID OBESITY WITH BMI OF 40.0-44.9, ADULT (HCC): ICD-10-CM

## 2023-06-28 DIAGNOSIS — R60.0 BILATERAL LOWER EXTREMITY EDEMA: Primary | ICD-10-CM

## 2023-06-28 DIAGNOSIS — E78.2 MIXED HYPERLIPIDEMIA: ICD-10-CM

## 2023-06-28 PROCEDURE — 3017F COLORECTAL CA SCREEN DOC REV: CPT | Performed by: INTERNAL MEDICINE

## 2023-06-28 PROCEDURE — 93000 ELECTROCARDIOGRAM COMPLETE: CPT | Performed by: INTERNAL MEDICINE

## 2023-06-28 PROCEDURE — 1036F TOBACCO NON-USER: CPT | Performed by: INTERNAL MEDICINE

## 2023-06-28 PROCEDURE — G8417 CALC BMI ABV UP PARAM F/U: HCPCS | Performed by: INTERNAL MEDICINE

## 2023-06-28 PROCEDURE — G8427 DOCREV CUR MEDS BY ELIG CLIN: HCPCS | Performed by: INTERNAL MEDICINE

## 2023-06-28 PROCEDURE — 99205 OFFICE O/P NEW HI 60 MIN: CPT | Performed by: INTERNAL MEDICINE

## 2023-06-28 RX ORDER — AMLODIPINE BESYLATE 10 MG/1
5 TABLET ORAL DAILY
Qty: 30 TABLET | Status: SHIPPED | COMMUNITY
Start: 2023-06-28

## 2023-06-28 RX ORDER — ATORVASTATIN CALCIUM 20 MG/1
20 TABLET, FILM COATED ORAL DAILY
COMMUNITY
Start: 2023-04-19

## 2023-06-28 RX ORDER — SPIRONOLACTONE 25 MG/1
25 TABLET ORAL DAILY
Qty: 30 TABLET | Refills: 5 | Status: SHIPPED | OUTPATIENT
Start: 2023-06-28

## 2023-06-28 ASSESSMENT — ENCOUNTER SYMPTOMS
ABDOMINAL PAIN: 0
HEMOPTYSIS: 0
EYE REDNESS: 0
DOUBLE VISION: 0
HEMATEMESIS: 0
HEMATOCHEZIA: 0
WHEEZING: 0
STRIDOR: 0
HOARSE VOICE: 0

## 2023-07-20 ENCOUNTER — HOSPITAL ENCOUNTER (EMERGENCY)
Age: 63
Discharge: HOME | End: 2023-07-20
Attending: EMERGENCY MEDICINE
Payer: MEDICARE

## 2023-07-20 ENCOUNTER — APPOINTMENT (OUTPATIENT)
Dept: GENERAL RADIOLOGY | Age: 63
End: 2023-07-20
Payer: MEDICARE

## 2023-07-20 VITALS
DIASTOLIC BLOOD PRESSURE: 74 MMHG | TEMPERATURE: 98.1 F | HEART RATE: 74 BPM | BODY MASS INDEX: 42.68 KG/M2 | WEIGHT: 250 LBS | HEIGHT: 64 IN | RESPIRATION RATE: 20 BRPM | SYSTOLIC BLOOD PRESSURE: 134 MMHG | OXYGEN SATURATION: 97 %

## 2023-07-20 DIAGNOSIS — R60.9 EDEMA, UNSPECIFIED TYPE: ICD-10-CM

## 2023-07-20 DIAGNOSIS — J06.9 VIRAL URI WITH COUGH: Primary | ICD-10-CM

## 2023-07-20 LAB
ALBUMIN SERPL-MCNC: 3.6 G/DL (ref 3.2–4.6)
ALBUMIN/GLOB SERPL: 1.1 (ref 0.4–1.6)
ALP SERPL-CCNC: 104 U/L (ref 50–136)
ALT SERPL-CCNC: 25 U/L (ref 12–65)
ANION GAP SERPL CALC-SCNC: 8 MMOL/L (ref 2–11)
AST SERPL-CCNC: 16 U/L (ref 15–37)
BASOPHILS # BLD: 0 K/UL (ref 0–0.2)
BASOPHILS NFR BLD: 0 % (ref 0–2)
BILIRUB SERPL-MCNC: 0.3 MG/DL (ref 0.2–1.1)
BUN SERPL-MCNC: 14 MG/DL (ref 8–23)
CALCIUM SERPL-MCNC: 9.2 MG/DL (ref 8.3–10.4)
CHLORIDE SERPL-SCNC: 109 MMOL/L (ref 101–110)
CO2 SERPL-SCNC: 25 MMOL/L (ref 21–32)
CREAT SERPL-MCNC: 0.9 MG/DL (ref 0.6–1)
DIFFERENTIAL METHOD BLD: ABNORMAL
EKG ATRIAL RATE: 81 BPM
EKG DIAGNOSIS: NORMAL
EKG P AXIS: 74 DEGREES
EKG P-R INTERVAL: 174 MS
EKG Q-T INTERVAL: 346 MS
EKG QRS DURATION: 84 MS
EKG QTC CALCULATION (BAZETT): 401 MS
EKG R AXIS: 53 DEGREES
EKG T AXIS: 57 DEGREES
EKG VENTRICULAR RATE: 81 BPM
EOSINOPHIL # BLD: 0.4 K/UL (ref 0–0.8)
EOSINOPHIL NFR BLD: 4 % (ref 0.5–7.8)
ERYTHROCYTE [DISTWIDTH] IN BLOOD BY AUTOMATED COUNT: 13.4 % (ref 11.9–14.6)
GLOBULIN SER CALC-MCNC: 3.4 G/DL (ref 2.8–4.5)
GLUCOSE SERPL-MCNC: 121 MG/DL (ref 65–100)
HCT VFR BLD AUTO: 35.9 % (ref 35.8–46.3)
HGB BLD-MCNC: 11.6 G/DL (ref 11.7–15.4)
IMM GRANULOCYTES # BLD AUTO: 0 K/UL (ref 0–0.5)
IMM GRANULOCYTES NFR BLD AUTO: 0 % (ref 0–5)
LYMPHOCYTES # BLD: 2.2 K/UL (ref 0.5–4.6)
LYMPHOCYTES NFR BLD: 24 % (ref 13–44)
MAGNESIUM SERPL-MCNC: 1.8 MG/DL (ref 1.8–2.4)
MCH RBC QN AUTO: 28.4 PG (ref 26.1–32.9)
MCHC RBC AUTO-ENTMCNC: 32.3 G/DL (ref 31.4–35)
MCV RBC AUTO: 87.8 FL (ref 82–102)
MONOCYTES # BLD: 0.6 K/UL (ref 0.1–1.3)
MONOCYTES NFR BLD: 7 % (ref 4–12)
NEUTS SEG # BLD: 5.9 K/UL (ref 1.7–8.2)
NEUTS SEG NFR BLD: 65 % (ref 43–78)
NRBC # BLD: 0 K/UL (ref 0–0.2)
PLATELET # BLD AUTO: 324 K/UL (ref 150–450)
PMV BLD AUTO: 10.1 FL (ref 9.4–12.3)
POTASSIUM SERPL-SCNC: 4.2 MMOL/L (ref 3.5–5.1)
PROT SERPL-MCNC: 7 G/DL (ref 6.3–8.2)
RBC # BLD AUTO: 4.09 M/UL (ref 4.05–5.2)
SODIUM SERPL-SCNC: 142 MMOL/L (ref 133–143)
TROPONIN I SERPL HS-MCNC: 3.2 PG/ML (ref 0–14)
WBC # BLD AUTO: 9.2 K/UL (ref 4.3–11.1)

## 2023-07-20 PROCEDURE — 84484 ASSAY OF TROPONIN QUANT: CPT

## 2023-07-20 PROCEDURE — 93005 ELECTROCARDIOGRAM TRACING: CPT | Performed by: EMERGENCY MEDICINE

## 2023-07-20 PROCEDURE — 93010 ELECTROCARDIOGRAM REPORT: CPT | Performed by: INTERNAL MEDICINE

## 2023-07-20 PROCEDURE — 80053 COMPREHEN METABOLIC PANEL: CPT

## 2023-07-20 PROCEDURE — 94762 N-INVAS EAR/PLS OXIMTRY CONT: CPT

## 2023-07-20 PROCEDURE — 71045 X-RAY EXAM CHEST 1 VIEW: CPT

## 2023-07-20 PROCEDURE — 85025 COMPLETE CBC W/AUTO DIFF WBC: CPT

## 2023-07-20 PROCEDURE — 83735 ASSAY OF MAGNESIUM: CPT

## 2023-07-20 PROCEDURE — 99285 EMERGENCY DEPT VISIT HI MDM: CPT

## 2023-07-20 RX ORDER — SODIUM CHLORIDE 0.9 % (FLUSH) 0.9 %
5 SYRINGE (ML) INJECTION AS NEEDED
Status: DISCONTINUED | OUTPATIENT
Start: 2023-07-20 | End: 2023-07-20 | Stop reason: HOSPADM

## 2023-07-20 RX ORDER — GUAIFENESIN 600 MG/1
600 TABLET, EXTENDED RELEASE ORAL 2 TIMES DAILY
Qty: 14 TABLET | Refills: 0 | Status: SHIPPED | OUTPATIENT
Start: 2023-07-20 | End: 2023-07-27

## 2023-07-20 ASSESSMENT — PAIN DESCRIPTION - LOCATION: LOCATION: GENERALIZED

## 2023-07-20 ASSESSMENT — LIFESTYLE VARIABLES
HOW OFTEN DO YOU HAVE A DRINK CONTAINING ALCOHOL: NEVER
HOW MANY STANDARD DRINKS CONTAINING ALCOHOL DO YOU HAVE ON A TYPICAL DAY: PATIENT DOES NOT DRINK

## 2023-07-20 ASSESSMENT — PAIN - FUNCTIONAL ASSESSMENT: PAIN_FUNCTIONAL_ASSESSMENT: 0-10

## 2023-07-20 ASSESSMENT — PAIN DESCRIPTION - FREQUENCY: FREQUENCY: CONTINUOUS

## 2023-07-20 ASSESSMENT — PAIN SCALES - GENERAL: PAINLEVEL_OUTOF10: 6

## 2023-07-20 ASSESSMENT — PAIN DESCRIPTION - DESCRIPTORS: DESCRIPTORS: ACHING;DISCOMFORT

## 2023-07-20 ASSESSMENT — PAIN DESCRIPTION - PAIN TYPE: TYPE: ACUTE PAIN

## 2023-07-20 NOTE — ED PROVIDER NOTES
referral of this patient. This exam was interpreted by an    American Board of Radiology certified radiologist with subspecialty fellowship    training in [address] [place] [address]. If there are any questions regarding this exam please    feel free to contact a radiologist directly at [phone]. Alley Farias MD    7/20/2023 7:54:00 PM                        Voice dictation software was used during the making of this note. This software is not perfect and grammatical and other typographical errors may be present. This note has not been completely proofread for errors.      Ely German DO  07/20/23 2013

## 2023-07-20 NOTE — ED TRIAGE NOTES
Pt c/o fatigue, generalized body aches, dyspnea and nonproductive cough x5days (+)slight chest pain intermittently w cough  (-)fever  Hx COPD, does not wear O2 at home  A&Ox4

## 2023-07-21 NOTE — DISCHARGE INSTRUCTIONS
Take Mucinex as needed for chest congestion and cough. Follow-up outpatient with your primary care physician as well as your cardiologist.  Call to make these appointments. Return to the ER for worsening or worrisome symptoms.

## 2023-08-07 ENCOUNTER — TELEPHONE (OUTPATIENT)
Dept: PULMONOLOGY | Age: 63
End: 2023-08-07

## 2023-08-07 RX ORDER — DOXYCYCLINE HYCLATE 100 MG
TABLET ORAL
COMMUNITY
Start: 2023-08-05

## 2023-08-07 RX ORDER — PREDNISONE 10 MG/1
TABLET ORAL
COMMUNITY
Start: 2023-08-05

## 2023-08-07 NOTE — TELEPHONE ENCOUNTER
TRIAGE CALL      Complaint: Coughing  Cough: yes  Productive:  yes  Bloody Sputum:  no  Increased SOB/Wheezing:  yes  Duration: 2 weeks  Fever/Chills: no  OTC Meds tried: none  Patient sent in message last week thru my chart.   Called in over the weekend and we called in antibotic and prednisone

## 2023-08-07 NOTE — TELEPHONE ENCOUNTER
Able to speak with patient, she was able to get the 2 Rx over the weekend, this is the 2nd round of steroid/antibiotic combination; c/o not being able to move around at all due to coughing; unable to get anything up feels like all mucous is trapped; takes mucinex BID, uses inhaler and using nebulizer. Using albuterol in the nebulizer, coughs during the treatment but doesn't feel like they are helping much. Most days gets 4 tx in if not that many gets 3 for sure. Reports she has had COPD exacerbations many times but never had 1 last for 2 weeks. Negative COVID test this morning; Aches all over, having some peripheral numbness in fingers, toes and lips. Feels like she is choking when having coughing episodes. Able to check O2 levels and during episodes drops to low 90s but quickly recovers. Understands may have failed outpatient therapy but would like to be seen in office before ER, worked in for next open appt 8/9. Advised report to ER if worsening prior.

## 2023-08-08 ENCOUNTER — OFFICE VISIT (OUTPATIENT)
Age: 63
End: 2023-08-08
Payer: MEDICARE

## 2023-08-08 VITALS
BODY MASS INDEX: 43.19 KG/M2 | DIASTOLIC BLOOD PRESSURE: 80 MMHG | SYSTOLIC BLOOD PRESSURE: 138 MMHG | HEART RATE: 72 BPM | HEIGHT: 64 IN | WEIGHT: 253 LBS

## 2023-08-08 DIAGNOSIS — E78.2 MIXED HYPERLIPIDEMIA: ICD-10-CM

## 2023-08-08 DIAGNOSIS — E66.01 MORBID OBESITY WITH BMI OF 40.0-44.9, ADULT (HCC): ICD-10-CM

## 2023-08-08 DIAGNOSIS — R06.09 DYSPNEA ON EXERTION: Primary | ICD-10-CM

## 2023-08-08 DIAGNOSIS — G47.33 OSA (OBSTRUCTIVE SLEEP APNEA): ICD-10-CM

## 2023-08-08 DIAGNOSIS — R60.0 BILATERAL LOWER EXTREMITY EDEMA: ICD-10-CM

## 2023-08-08 PROCEDURE — 1036F TOBACCO NON-USER: CPT | Performed by: INTERNAL MEDICINE

## 2023-08-08 PROCEDURE — G8427 DOCREV CUR MEDS BY ELIG CLIN: HCPCS | Performed by: INTERNAL MEDICINE

## 2023-08-08 PROCEDURE — 3017F COLORECTAL CA SCREEN DOC REV: CPT | Performed by: INTERNAL MEDICINE

## 2023-08-08 PROCEDURE — G8417 CALC BMI ABV UP PARAM F/U: HCPCS | Performed by: INTERNAL MEDICINE

## 2023-08-08 PROCEDURE — 99214 OFFICE O/P EST MOD 30 MIN: CPT | Performed by: INTERNAL MEDICINE

## 2023-08-08 ASSESSMENT — ENCOUNTER SYMPTOMS
STRIDOR: 0
HEMATEMESIS: 0
HOARSE VOICE: 0
DOUBLE VISION: 0
ABDOMINAL PAIN: 0
WHEEZING: 0
HEMOPTYSIS: 0
EYE REDNESS: 0
HEMATOCHEZIA: 0

## 2023-08-08 NOTE — PROGRESS NOTES
<25 ppb 25-50 ppb >50ppb     Exercise Oximetry:    Echo:   TRANSTHORACIC ECHOCARDIOGRAM (TTE) COMPLETE (CONTRAST/BUBBLE/3D PRN) 07/28/2023    Interpretation Summary    Left Ventricle: Normal left ventricular systolic function with a visually estimated EF of 55 - 60%. Left ventricle size is normal. Mildly increased wall thickness. Findings consistent with mild concentric hypertrophy. Normal wall motion. Diastolic dysfunction present with normal LV EF. Average E/e' ratio is 10.70. Left Atrium: Left atrium is mildly dilated.       [facility] Reference Info:                                                                                                                  Immunization History   Administered Date(s) Administered    COVID-19, MODERNA BLUE border, Primary or Immunocompromised, (age 12y+), IM, 100 mcg/0.5mL 02/26/2021, 03/26/2021, 10/25/2021    Influenza Quadv 10/26/2015    Influenza Virus Vaccine 09/20/2013, 09/27/2016, 09/11/2018, 10/10/2018, 09/18/2019, 10/01/2021    Influenza, AFLURIA (age 1 yrs+), FLUZONE, (age 10 mo+), MDV, 0.5mL 10/26/2015    Influenza, FLUARIX, FLULAVAL, FLUZONE (age 10 mo+) AND AFLURIA, (age 1 y+), PF, 0.5mL 09/27/2016, 09/18/2019, 10/14/2022    Influenza, FLUBLOK, (age 25 y+), PF, 0.5mL 10/21/2020, 10/18/2021    Influenza, FLUCELVAX, (age 10 mo+), MDCK, PF, 0.5mL 10/16/2017    Pneumococcal Vaccine 09/20/2013    Pneumococcal, PCV-13, PREVNAR 15, (age 6w+), IM, 0.5mL 10/26/2015    Pneumococcal, PPSV23, PNEUMOVAX 23, (age 2y+), SC/IM, 0.5mL 09/20/2013, 09/11/2018    TDaP, ADACEL (age 6y-58y), BOOSTRIX (age 10y+), IM, 0.5mL 03/30/2009    Zoster Recombinant (Shingrix) 07/02/2021     Past Medical History:   Diagnosis Date    Allergic rhinitis 4/3/2014    Anxiety 5/30/2014    Asthma 5/30/2014    Circadian rhythm disorder     COPD (chronic obstructive pulmonary disease) ([address]) 4/3/2014    DDD (degenerative disc disease) 4/3/2014    Depression, major, recurrent ([address]) 4/3/2014    controlled with

## 2023-08-08 NOTE — PROGRESS NOTES
[address]  [address] [place] [address] [facility]  PHONE: [phone]          23    NAME:  Nir Monzon  : 1960  MRN: [id number]         SUBJECTIVE:   Nir Monzon is a 57 y.o. female seen for a visit regarding the following:     Chief Complaint   Patient presents with    Results     Echo    Hyperlipidemia           HPI:    Cardio problem list:  1. Bilateral lower extremity edema/dyspnea on exertion  -Echo from 2023 shows an EF of 55 to 60% with no regional wall motion abnormalities, mild concentric LVH, mildly dilated left atrium with some diastolic dysfunction  -Had renal dysfunction with hydrochlorothiazide. 2.  Hypertension  3. Hyperlipidemia  4. Obstructive sleep apnea on CPAP therapy  5. Obesity    Dear Dr. Lisa Vanegas,  I saw Ms Sabina kramer 55-year-old woman in cardiovascular follow up for bilateral lower extremity edema with known underlying hypertension, hyperlipidemia, sleep apnea and morbid obesity. When we last met with her, we started her on spironolactone and cut back her amlodipine in view of lower extremity edema and we also ordered an echo which showed preserved LV systolic function with some diastolic dysfunction. Bilateral lower extremity edema/dyspnea on exertion  -Has been present over the last several weeks. She was on amlodipine 10 mg once daily and after we cut back the dose, her edema has since resolved with the addition of spironolactone. She has sleep apnea which can aggravate lower extremity edema and she also is overweight with decreased mobility. All these can certainly aggravate lower extremity edema. No significant history of DVT/pulmonary embolism. She says she is compliant with her CPAP machine. Denies any excessive sodium intake. Tried hydrochlorothiazide in the past but had some renal dysfunction with it. Lower extremity edema has since resolved and she feels well.     Dyspnea on exertion-still present-feels that it

## 2023-08-09 ENCOUNTER — OFFICE VISIT (OUTPATIENT)
Dept: PULMONOLOGY | Age: 63
End: 2023-08-09
Payer: MEDICARE

## 2023-08-09 VITALS
OXYGEN SATURATION: 99 % | DIASTOLIC BLOOD PRESSURE: 84 MMHG | HEART RATE: 79 BPM | BODY MASS INDEX: 42.85 KG/M2 | SYSTOLIC BLOOD PRESSURE: 148 MMHG | WEIGHT: 251 LBS | HEIGHT: 64 IN | RESPIRATION RATE: 20 BRPM | TEMPERATURE: 97 F

## 2023-08-09 DIAGNOSIS — J44.9 COPD, MILD (HCC): ICD-10-CM

## 2023-08-09 DIAGNOSIS — J44.1 COPD EXACERBATION (HCC): Primary | ICD-10-CM

## 2023-08-09 DIAGNOSIS — K21.9 GASTROESOPHAGEAL REFLUX DISEASE WITHOUT ESOPHAGITIS: ICD-10-CM

## 2023-08-09 LAB
EXPIRATORY TIME: NORMAL
FEF 25-75% %PRED-PRE: NORMAL
FEF 25-75% PRED: NORMAL
FEF 25-75%-PRE: NORMAL
FEV1 %PRED-PRE: 69 %
FEV1 PRED: NORMAL
FEV1/FVC %PRED-PRE: NORMAL
FEV1/FVC PRED: NORMAL
FEV1/FVC: 76 %
FEV1: 1.7 L
FVC %PRED-PRE: 70 %
FVC PRED: NORMAL
FVC: 2.22 L
PEF %PRED-PRE: NORMAL
PEF PRED: NORMAL
PEF-PRE: NORMAL

## 2023-08-09 PROCEDURE — G8417 CALC BMI ABV UP PARAM F/U: HCPCS | Performed by: NURSE PRACTITIONER

## 2023-08-09 PROCEDURE — 99214 OFFICE O/P EST MOD 30 MIN: CPT | Performed by: NURSE PRACTITIONER

## 2023-08-09 PROCEDURE — 96372 THER/PROPH/DIAG INJ SC/IM: CPT | Performed by: NURSE PRACTITIONER

## 2023-08-09 PROCEDURE — 3023F SPIROM DOC REV: CPT | Performed by: NURSE PRACTITIONER

## 2023-08-09 PROCEDURE — G8427 DOCREV CUR MEDS BY ELIG CLIN: HCPCS | Performed by: NURSE PRACTITIONER

## 2023-08-09 PROCEDURE — 94010 BREATHING CAPACITY TEST: CPT | Performed by: INTERNAL MEDICINE

## 2023-08-09 PROCEDURE — 1036F TOBACCO NON-USER: CPT | Performed by: NURSE PRACTITIONER

## 2023-08-09 PROCEDURE — 3017F COLORECTAL CA SCREEN DOC REV: CPT | Performed by: NURSE PRACTITIONER

## 2023-08-09 RX ORDER — ALBUTEROL SULFATE 2.5 MG/3ML
2.5 SOLUTION RESPIRATORY (INHALATION) EVERY 4 HOURS PRN
Qty: 120 EACH | Refills: 11 | Status: SHIPPED | OUTPATIENT
Start: 2023-08-09

## 2023-08-09 RX ORDER — MONTELUKAST SODIUM 10 MG/1
10 TABLET ORAL DAILY
Qty: 90 TABLET | Refills: 3 | Status: SHIPPED | OUTPATIENT
Start: 2023-08-09

## 2023-08-09 RX ORDER — PANTOPRAZOLE SODIUM 40 MG/1
40 TABLET, DELAYED RELEASE ORAL 2 TIMES DAILY
Qty: 180 TABLET | Refills: 3 | Status: SHIPPED | OUTPATIENT
Start: 2023-08-09

## 2023-08-09 RX ORDER — ALBUTEROL SULFATE 90 UG/1
2 AEROSOL, METERED RESPIRATORY (INHALATION) EVERY 4 HOURS PRN
Qty: 18 G | Refills: 11 | Status: SHIPPED | OUTPATIENT
Start: 2023-08-09

## 2023-08-09 RX ORDER — DEXAMETHASONE SODIUM PHOSPHATE 10 MG/ML
8 INJECTION INTRAMUSCULAR; INTRAVENOUS ONCE
Status: COMPLETED | OUTPATIENT
Start: 2023-08-09 | End: 2023-08-09

## 2023-08-09 RX ORDER — HYDROCODONE POLISTIREX AND CHLORPHENIRAMINE POLISTIREX 10; 8 MG/5ML; MG/5ML
5 SUSPENSION, EXTENDED RELEASE ORAL EVERY 12 HOURS PRN
Qty: 100 ML | Refills: 0 | Status: SHIPPED | OUTPATIENT
Start: 2023-08-09 | End: 2023-08-19

## 2023-08-09 RX ADMIN — DEXAMETHASONE SODIUM PHOSPHATE 8 MG: 10 INJECTION INTRAMUSCULAR; INTRAVENOUS at 09:45

## 2023-08-09 ASSESSMENT — PULMONARY FUNCTION TESTS
FEV1/FVC: 76
FEV1_PERCENT_PREDICTED_PRE: 69
FVC: 2.22
FEV1: 1.70
FVC_PERCENT_PREDICTED_PRE: 70

## 2023-08-09 NOTE — PATIENT INSTRUCTIONS
Tussionex 1 teaspoon every 12 hours if needed for severe cough. Will cause drowsiness. Complete Prednisone and doxycycline.

## 2023-08-11 ENCOUNTER — HOSPITAL ENCOUNTER (OUTPATIENT)
Dept: MAMMOGRAPHY | Age: 63
End: 2023-08-11
Payer: MEDICARE

## 2023-08-11 DIAGNOSIS — Z12.31 VISIT FOR SCREENING MAMMOGRAM: ICD-10-CM

## 2023-08-11 PROCEDURE — 77067 SCR MAMMO BI INCL CAD: CPT

## 2023-08-16 ENCOUNTER — HOSPITAL ENCOUNTER (OUTPATIENT)
Dept: MAMMOGRAPHY | Age: 63
Discharge: HOME | End: 2023-08-19
Payer: MEDICARE

## 2023-08-16 DIAGNOSIS — R92.8 ABNORMAL SCREENING MAMMOGRAM: ICD-10-CM

## 2023-08-16 PROCEDURE — G0279 TOMOSYNTHESIS, MAMMO: HCPCS

## 2023-09-03 NOTE — PROGRESS NOTES
inhaler 1 puff, Inhalation, 2 times daily    FOLIC ACID PO Oral    Levothyroxine Sodium 112 MCG CAPS Take 1 tablet by mouth once daily BEFORE BREAKFAST    liothyronine (CYTOMEL) 5 mcg, Oral, DAILY    magnesium oxide (MAG-OX) 400 mg, Oral, DAILY    montelukast (SINGULAIR) 10 mg, Oral, DAILY, Dx copd j44.9    Multiple Vitamin (MULTIVITAMIN ADULT PO) Oral    omeprazole (PRILOSEC) 40 mg, Oral, DAILY    pantoprazole (PROTONIX) 40 mg, Oral, 2 TIMES DAILY    raloxifene (EVISTA) 60 mg, Oral, DAILY    spironolactone (ALDACTONE) 25 mg, Oral, DAILY    tiotropium (SPIRIVA RESPIMAT) 2.5 MCG/ACT AERS inhaler 2 puffs, Inhalation, DAILY, Dx code j44.9    vitamin D 4,000 Units, Oral, DAILY

## 2023-09-05 ENCOUNTER — OFFICE VISIT (OUTPATIENT)
Dept: PULMONOLOGY | Age: 63
End: 2023-09-05
Payer: MEDICARE

## 2023-09-05 VITALS
TEMPERATURE: 97.6 F | RESPIRATION RATE: 19 BRPM | HEART RATE: 89 BPM | BODY MASS INDEX: 42.8 KG/M2 | OXYGEN SATURATION: 98 % | WEIGHT: 250.7 LBS | HEIGHT: 64 IN | SYSTOLIC BLOOD PRESSURE: 130 MMHG | DIASTOLIC BLOOD PRESSURE: 72 MMHG

## 2023-09-05 DIAGNOSIS — K21.9 GASTROESOPHAGEAL REFLUX DISEASE WITHOUT ESOPHAGITIS: ICD-10-CM

## 2023-09-05 DIAGNOSIS — Z09 HOSPITAL DISCHARGE FOLLOW-UP: ICD-10-CM

## 2023-09-05 DIAGNOSIS — J44.9 COPD, MILD (HCC): Primary | ICD-10-CM

## 2023-09-05 DIAGNOSIS — E66.01 MORBID (SEVERE) OBESITY DUE TO EXCESS CALORIES (HCC): ICD-10-CM

## 2023-09-05 DIAGNOSIS — G47.33 OBSTRUCTIVE SLEEP APNEA (ADULT) (PEDIATRIC): ICD-10-CM

## 2023-09-05 DIAGNOSIS — J44.1 COPD EXACERBATION (HCC): ICD-10-CM

## 2023-09-05 PROCEDURE — 3023F SPIROM DOC REV: CPT | Performed by: NURSE PRACTITIONER

## 2023-09-05 PROCEDURE — G8417 CALC BMI ABV UP PARAM F/U: HCPCS | Performed by: NURSE PRACTITIONER

## 2023-09-05 PROCEDURE — 99214 OFFICE O/P EST MOD 30 MIN: CPT | Performed by: NURSE PRACTITIONER

## 2023-09-05 PROCEDURE — G8427 DOCREV CUR MEDS BY ELIG CLIN: HCPCS | Performed by: NURSE PRACTITIONER

## 2023-09-05 PROCEDURE — 1036F TOBACCO NON-USER: CPT | Performed by: NURSE PRACTITIONER

## 2023-09-05 PROCEDURE — 3017F COLORECTAL CA SCREEN DOC REV: CPT | Performed by: NURSE PRACTITIONER

## 2023-09-05 RX ORDER — FLUTICASONE PROPIONATE AND SALMETEROL 500; 50 UG/1; UG/1
1 POWDER RESPIRATORY (INHALATION) 2 TIMES DAILY
COMMUNITY

## 2023-09-05 NOTE — PROGRESS NOTES
Cancer Mother 44         Allergies   Allergen Reactions    Oxycodone Nausea And Vomiting and Swelling     Pt states having great swelling with Oxycodone         Current Outpatient Medications   Medication Sig    fluticasone-salmeterol (ADVAIR) 500-50 MCG/ACT AEPB diskus inhaler Inhale 1 puff into the lungs in the morning and at bedtime    montelukast (SINGULAIR) 10 MG tablet Take 1 tablet by mouth daily Dx copd j44.9    tiotropium (SPIRIVA RESPIMAT) 2.5 MCG/ACT AERS inhaler Inhale 2 puffs into the lungs daily Dx code j44.9    albuterol (PROVENTIL) (2.5 MG/3ML) 0.083% nebulizer solution Take 3 mLs by nebulization every 4 hours as needed for Wheezing or Shortness of Breath Copd dx code j44.9    pantoprazole (PROTONIX) 40 MG tablet Take 1 tablet by mouth 2 times daily    albuterol sulfate HFA (VENTOLIN HFA) 108 (90 Base) MCG/ACT inhaler Inhale 2 puffs into the lungs every 4 hours as needed for Wheezing    FOLIC ACID PO Take by mouth    Multiple Vitamin (MULTIVITAMIN ADULT PO) Take by mouth    atorvastatin (LIPITOR) 20 MG tablet Take 1 tablet by mouth daily    spironolactone (ALDACTONE) 25 MG tablet Take 1 tablet by mouth daily    amLODIPine (NORVASC) 2.5 MG tablet Take 2 tablets by mouth daily    famotidine (PEPCID) 20 MG tablet Take 1 tablet by mouth at bedtime    CPAP Machine MISC by Does not apply route    amitriptyline (ELAVIL) 25 MG tablet Take 1 tablet by mouth    ARIPiprazole (ABILIFY) 15 MG tablet Take 1 tablet by mouth daily    calcium carbonate-vitamin D (CALTRATE) 600-400 MG-UNIT TABS per tab Take 1 tablet by mouth daily    vitamin D 25 MCG (1000 UT) CAPS Take 4 capsules by mouth daily    escitalopram (LEXAPRO) 10 MG tablet Take 1 tablet by mouth daily    Levothyroxine Sodium 112 MCG CAPS Take 1 tablet by mouth once daily BEFORE BREAKFAST    liothyronine (CYTOMEL) 5 MCG tablet Take 1 tablet by mouth daily    magnesium oxide (MAG-OX) 400 MG tablet Take 1 tablet by mouth daily    raloxifene (EVISTA) 60 MG

## 2023-09-06 ENCOUNTER — OFFICE VISIT (OUTPATIENT)
Dept: SLEEP MEDICINE | Age: 63
End: 2023-09-06
Payer: MEDICARE

## 2023-09-06 VITALS
SYSTOLIC BLOOD PRESSURE: 124 MMHG | WEIGHT: 247 LBS | DIASTOLIC BLOOD PRESSURE: 68 MMHG | RESPIRATION RATE: 15 BRPM | OXYGEN SATURATION: 97 % | HEART RATE: 91 BPM | BODY MASS INDEX: 42.17 KG/M2 | HEIGHT: 64 IN | TEMPERATURE: 97.3 F

## 2023-09-06 DIAGNOSIS — J44.1 COPD EXACERBATION (HCC): ICD-10-CM

## 2023-09-06 DIAGNOSIS — Z78.9 DIFFICULTY USING CONTINUOUS POSITIVE AIRWAY PRESSURE (CPAP) NASAL MASK: ICD-10-CM

## 2023-09-06 DIAGNOSIS — R09.02 HYPOXEMIA: ICD-10-CM

## 2023-09-06 DIAGNOSIS — E66.01 MORBID OBESITY WITH BMI OF 40.0-44.9, ADULT (HCC): ICD-10-CM

## 2023-09-06 DIAGNOSIS — G47.33 OSA (OBSTRUCTIVE SLEEP APNEA): Primary | ICD-10-CM

## 2023-09-06 DIAGNOSIS — R22.43 LOCALIZED SWELLING OF BOTH LOWER LEGS: ICD-10-CM

## 2023-09-06 DIAGNOSIS — G47.00 INSOMNIA, UNSPECIFIED TYPE: ICD-10-CM

## 2023-09-06 PROCEDURE — 3023F SPIROM DOC REV: CPT | Performed by: INTERNAL MEDICINE

## 2023-09-06 PROCEDURE — 99214 OFFICE O/P EST MOD 30 MIN: CPT | Performed by: INTERNAL MEDICINE

## 2023-09-06 PROCEDURE — G8427 DOCREV CUR MEDS BY ELIG CLIN: HCPCS | Performed by: INTERNAL MEDICINE

## 2023-09-06 PROCEDURE — G8417 CALC BMI ABV UP PARAM F/U: HCPCS | Performed by: INTERNAL MEDICINE

## 2023-09-06 PROCEDURE — 1036F TOBACCO NON-USER: CPT | Performed by: INTERNAL MEDICINE

## 2023-09-06 PROCEDURE — 3017F COLORECTAL CA SCREEN DOC REV: CPT | Performed by: INTERNAL MEDICINE

## 2023-09-06 ASSESSMENT — SLEEP AND FATIGUE QUESTIONNAIRES
ESS TOTAL SCORE: 7
HOW LIKELY ARE YOU TO NOD OFF OR FALL ASLEEP WHEN YOU ARE A PASSENGER IN A CAR FOR AN HOUR WITHOUT A BREAK: 0
HOW LIKELY ARE YOU TO NOD OFF OR FALL ASLEEP WHILE SITTING QUIETLY AFTER LUNCH WITHOUT ALCOHOL: 0
HOW LIKELY ARE YOU TO NOD OFF OR FALL ASLEEP WHILE SITTING INACTIVE IN A PUBLIC PLACE: 0
HOW LIKELY ARE YOU TO NOD OFF OR FALL ASLEEP WHILE LYING DOWN TO REST IN THE AFTERNOON WHEN CIRCUMSTANCES PERMIT: 3
HOW LIKELY ARE YOU TO NOD OFF OR FALL ASLEEP WHILE SITTING AND READING: 2
HOW LIKELY ARE YOU TO NOD OFF OR FALL ASLEEP WHILE SITTING AND TALKING TO SOMEONE: 0
HOW LIKELY ARE YOU TO NOD OFF OR FALL ASLEEP WHILE WATCHING TV: 2
HOW LIKELY ARE YOU TO NOD OFF OR FALL ASLEEP IN A CAR, WHILE STOPPED FOR A FEW MINUTES IN TRAFFIC: 0

## 2023-11-17 ENCOUNTER — PATIENT MESSAGE (OUTPATIENT)
Dept: PULMONOLOGY | Age: 63
End: 2023-11-17

## 2023-11-20 NOTE — TELEPHONE ENCOUNTER
From: Carlton De La Cruz  To: Christophe Haile  Sent: 2023 11:31 AM EST  Subject: COPD     July,  I have been having a mild COPD event for the past 5 days and it seems like it wants to go full-blown exacerbation. Can we head it off with some antibiotics and prednisone, please? I cannot be in the hospital any time soon. The holidays are coming and my classes are in session until . I am currently taking 1200mg Mucinex twice daily and the nebulizer treatments 3x per day - 1 vial of albuterol. I also take my Spiriva inhaler - 2 puffs in the morning and my discus medicine inhaler - 1 puff in the morning and 1 before bed. I am also resting as much as possible. If there is anything else that I should be doing, please let me know. You can send my prescriptions to [facility] at [address] [facility]. [place]. Thank you,  Carlton De La Cruz  : 1960  [email or web address]

## 2023-11-21 RX ORDER — DOXYCYCLINE HYCLATE 100 MG
100 TABLET ORAL 2 TIMES DAILY
Qty: 14 TABLET | Refills: 0 | OUTPATIENT
Start: 2023-11-21 | End: 2023-11-28

## 2023-11-21 RX ORDER — PREDNISONE 20 MG/1
TABLET ORAL
Qty: 15 TABLET | Refills: 0 | OUTPATIENT
Start: 2023-11-21

## 2023-11-21 NOTE — TELEPHONE ENCOUNTER
I sent patient a [employer] message yesterday--can you try to contact patieint and get another pharmacy? Thank you.

## 2023-11-22 RX ORDER — PREDNISONE 10 MG/1
TABLET ORAL
Qty: 30 TABLET | Refills: 0 | Status: SHIPPED | OUTPATIENT
Start: 2023-11-22

## 2023-11-22 RX ORDER — DOXYCYCLINE HYCLATE 100 MG
100 TABLET ORAL 2 TIMES DAILY
Qty: 14 TABLET | Refills: 0 | Status: SHIPPED | OUTPATIENT
Start: 2023-11-22 | End: 2023-11-29

## 2023-11-30 ENCOUNTER — PATIENT MESSAGE (OUTPATIENT)
Dept: PULMONOLOGY | Age: 63
End: 2023-11-30
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
## 2024-02-09 ENCOUNTER — OFFICE VISIT (OUTPATIENT)
Age: 64
End: 2024-02-09
Payer: MEDICARE

## 2024-02-09 VITALS
HEIGHT: 64 IN | BODY MASS INDEX: 40.63 KG/M2 | DIASTOLIC BLOOD PRESSURE: 86 MMHG | WEIGHT: 238 LBS | HEART RATE: 80 BPM | SYSTOLIC BLOOD PRESSURE: 134 MMHG

## 2024-02-09 DIAGNOSIS — G47.33 OSA (OBSTRUCTIVE SLEEP APNEA): ICD-10-CM

## 2024-02-09 DIAGNOSIS — R06.09 DYSPNEA ON EXERTION: Primary | ICD-10-CM

## 2024-02-09 DIAGNOSIS — R60.0 BILATERAL LOWER EXTREMITY EDEMA: ICD-10-CM

## 2024-02-09 DIAGNOSIS — I10 ESSENTIAL HYPERTENSION: ICD-10-CM

## 2024-02-09 DIAGNOSIS — E78.2 MIXED HYPERLIPIDEMIA: ICD-10-CM

## 2024-02-09 PROCEDURE — G8399 PT W/DXA RESULTS DOCUMENT: HCPCS | Performed by: INTERNAL MEDICINE

## 2024-02-09 PROCEDURE — 99214 OFFICE O/P EST MOD 30 MIN: CPT | Performed by: INTERNAL MEDICINE

## 2024-02-09 PROCEDURE — 1090F PRES/ABSN URINE INCON ASSESS: CPT | Performed by: INTERNAL MEDICINE

## 2024-02-09 PROCEDURE — G8484 FLU IMMUNIZE NO ADMIN: HCPCS | Performed by: INTERNAL MEDICINE

## 2024-02-09 PROCEDURE — 1123F ACP DISCUSS/DSCN MKR DOCD: CPT | Performed by: INTERNAL MEDICINE

## 2024-02-09 PROCEDURE — G8427 DOCREV CUR MEDS BY ELIG CLIN: HCPCS | Performed by: INTERNAL MEDICINE

## 2024-02-09 PROCEDURE — 3079F DIAST BP 80-89 MM HG: CPT | Performed by: INTERNAL MEDICINE

## 2024-02-09 PROCEDURE — 3075F SYST BP GE 130 - 139MM HG: CPT | Performed by: INTERNAL MEDICINE

## 2024-02-09 PROCEDURE — 3017F COLORECTAL CA SCREEN DOC REV: CPT | Performed by: INTERNAL MEDICINE

## 2024-02-09 PROCEDURE — 1036F TOBACCO NON-USER: CPT | Performed by: INTERNAL MEDICINE

## 2024-02-09 PROCEDURE — G8417 CALC BMI ABV UP PARAM F/U: HCPCS | Performed by: INTERNAL MEDICINE

## 2024-02-09 RX ORDER — AMLODIPINE BESYLATE 2.5 MG/1
5 TABLET ORAL DAILY
Qty: 90 TABLET | Refills: 3 | Status: SHIPPED | OUTPATIENT
Start: 2024-02-09

## 2024-02-09 RX ORDER — SPIRONOLACTONE 25 MG/1
25 TABLET ORAL DAILY
Qty: 90 TABLET | Refills: 3 | Status: SHIPPED | OUTPATIENT
Start: 2024-02-09

## 2024-02-09 RX ORDER — DULOXETIN HYDROCHLORIDE 30 MG/1
30 CAPSULE, DELAYED RELEASE ORAL DAILY
COMMUNITY

## 2024-02-09 ASSESSMENT — ENCOUNTER SYMPTOMS
HOARSE VOICE: 0
HEMATEMESIS: 0
EYE REDNESS: 0
DOUBLE VISION: 0
HEMATOCHEZIA: 0
HEMOPTYSIS: 0
ABDOMINAL PAIN: 0
STRIDOR: 0
WHEEZING: 0

## 2024-02-09 NOTE — PROGRESS NOTES
[facility] CARDIOLOGY  [address] [place] [address], [address]  [place] [address]  PHONE: [phone]          24    NAME:  Anette Reynolds  : 1960  MRN: [id number]         SUBJECTIVE:   Anette Reynolds is a 63 y.o. female seen for a visit regarding the following:     Chief Complaint   Patient presents with    Other     Dyspnea on exertion            HPI:    Cardio problem list:  1.  Bilateral lower extremity edema/dyspnea on exertion  -Echo from 2023 shows an EF of 55 to 60% with no regional wall motion abnormalities, mild concentric LVH, mildly dilated left atrium with some diastolic dysfunction  -Had renal dysfunction with hydrochlorothiazide.  2.  Hypertension  3.  Hyperlipidemia  4.  Obstructive sleep apnea on CPAP therapy  5.  Obesity    Dear Dr. Muniz,  I saw Ms Rodolfo kramer 63-year-old woman in cardiovascular follow up for bilateral lower extremity edema with known underlying hypertension, hyperlipidemia, sleep apnea and morbid obesity.    We last met with him 6 months ago at which time we made no significant changes with her medical therapy.  Her lower extremity edema was much better with us cutting back amlodipine and instead putting her on spironolactone.  -We had previously gone through an echo that showed preserved LV systolic function with mild concentric LVH    Bilateral lower extremity edema/dyspnea on exertion  -Has been present over the last several weeks.  She was on amlodipine 10 mg once daily and after we cut back the dose, her edema has since resolved with the addition of spironolactone.  She has sleep apnea which can aggravate lower extremity edema and she also is overweight with decreased mobility.  All these can certainly aggravate lower extremity edema.  No significant history of DVT/pulmonary embolism.  She says she is compliant with her CPAP machine.  Denies any excessive sodium intake.  Tried hydrochlorothiazide in the past but had some renal dysfunction with it.  Lower

## 2024-02-27 RX ORDER — FAMOTIDINE 20 MG/1
20 TABLET, FILM COATED ORAL NIGHTLY
Qty: 30 TABLET | Refills: 1 | Status: SHIPPED | OUTPATIENT
Start: 2024-02-27

## 2024-03-05 ENCOUNTER — OFFICE VISIT (OUTPATIENT)
Dept: PULMONOLOGY | Age: 64
End: 2024-03-05
Payer: MEDICARE

## 2024-03-05 VITALS
SYSTOLIC BLOOD PRESSURE: 126 MMHG | BODY MASS INDEX: 39.95 KG/M2 | HEIGHT: 64 IN | TEMPERATURE: 97.1 F | WEIGHT: 234 LBS | DIASTOLIC BLOOD PRESSURE: 74 MMHG | RESPIRATION RATE: 18 BRPM | OXYGEN SATURATION: 98 % | HEART RATE: 83 BPM

## 2024-03-05 DIAGNOSIS — R05.3 POST-COVID CHRONIC COUGH: ICD-10-CM

## 2024-03-05 DIAGNOSIS — U09.9 POST-COVID CHRONIC COUGH: ICD-10-CM

## 2024-03-05 DIAGNOSIS — J44.9 COPD, MILD (HCC): Primary | ICD-10-CM

## 2024-03-05 DIAGNOSIS — E66.01 SEVERE OBESITY (BMI >= 40) (HCC): ICD-10-CM

## 2024-03-05 LAB
EXPIRATORY TIME: NORMAL
FEF 25-75% %PRED-PRE: NORMAL
FEF 25-75% PRED: NORMAL
FEF 25-75-PRE: NORMAL
FEV1 %PRED-PRE: 73 %
FEV1/FVC %PRED-PRE: NORMAL
FEV1/FVC PRED: NORMAL
FEV1/FVC: 79 %
FEV1: 1.83 L
FVC %PRED-PRE: 78 %
FVC PRED: 3 L
FVC: 2.33 L
PEF %PRED-PRE: NORMAL
PEF PRED: NORMAL
PEF-PRE: NORMAL

## 2024-03-05 PROCEDURE — 99214 OFFICE O/P EST MOD 30 MIN: CPT | Performed by: NURSE PRACTITIONER

## 2024-03-05 PROCEDURE — G8427 DOCREV CUR MEDS BY ELIG CLIN: HCPCS | Performed by: NURSE PRACTITIONER

## 2024-03-05 PROCEDURE — 3017F COLORECTAL CA SCREEN DOC REV: CPT | Performed by: NURSE PRACTITIONER

## 2024-03-05 PROCEDURE — G8417 CALC BMI ABV UP PARAM F/U: HCPCS | Performed by: NURSE PRACTITIONER

## 2024-03-05 PROCEDURE — 1036F TOBACCO NON-USER: CPT | Performed by: NURSE PRACTITIONER

## 2024-03-05 PROCEDURE — 1123F ACP DISCUSS/DSCN MKR DOCD: CPT | Performed by: NURSE PRACTITIONER

## 2024-03-05 PROCEDURE — G8399 PT W/DXA RESULTS DOCUMENT: HCPCS | Performed by: NURSE PRACTITIONER

## 2024-03-05 PROCEDURE — G8484 FLU IMMUNIZE NO ADMIN: HCPCS | Performed by: NURSE PRACTITIONER

## 2024-03-05 PROCEDURE — 1090F PRES/ABSN URINE INCON ASSESS: CPT | Performed by: NURSE PRACTITIONER

## 2024-03-05 PROCEDURE — 3023F SPIROM DOC REV: CPT | Performed by: NURSE PRACTITIONER

## 2024-03-05 PROCEDURE — 94010 BREATHING CAPACITY TEST: CPT | Performed by: NURSE PRACTITIONER

## 2024-03-05 RX ORDER — FAMOTIDINE 20 MG/1
20 TABLET, FILM COATED ORAL
Qty: 90 TABLET | Refills: 3 | Status: SHIPPED | OUTPATIENT
Start: 2024-03-05

## 2024-03-05 ASSESSMENT — PULMONARY FUNCTION TESTS
FVC_PERCENT_PREDICTED_PRE: 78
FVC_PREDICTED: 3.00
FEV1/FVC: 79
FEV1: 1.83
FVC: 2.33
FEV1_PERCENT_PREDICTED_PRE: 73
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
## 2024-03-26 NOTE — PROGRESS NOTES
[facility]  [address]  [place] [address]  [phone]    Patient Name:  Anette Reynolds  YOB: 1960      Office Visit 3/27/2024    CHIEF COMPLAINT:    Chief Complaint   Patient presents with    Sleep Apnea    Follow-up         HISTORY OF PRESENT ILLNESS:  Patient is a 64 yo female seen today for follow up of COLUMBA.  Diagnostic sleep study in 2014 with an AHI of 10.8 and lowest oxygen saturation of 72%. She is prescribed cpap therapy with a humidifier set at 7-15 cm with a nasal mask. Most recent download reveals AHI on PAP therapy is 1.2, leak is median 3.0 and 22.3 at 95 percentile and the hourly usage is 6 hours 28 minutes nightly. The overall use is 1266 hours with days greater than four hours at 170/202. The patient is compliant with the Pap therapy and is feeling better as a result.  Her compliance with CPAP is good.  She reports that some nights she does not get to bed until later than she would like but that is usually because she is working on homework.  She is currently working on her bachelor's degree in accounting and says she will finish that this year.  She states that she awakens refreshed in the mornings and denies any excessive daytime sleepiness or fatigue.  Largo score is 4/24.  Reports that she has lost about 5 more pounds since her last visit.  Her blood pressure is well-controlled today.        Download        Largo Sleepiness Scale         3/27/2024     3:24 PM 9/6/2023     2:06 PM 5/9/2023     1:07 PM 4/7/2021     2:00 PM   Sleep Medicine   Sitting and reading 1 2 3 3   Watching TV 0 2 0 3   Sitting, inactive in a public place (e.g. a theatre or a meeting) 0 0 0 2   As a passenger in a car for an hour without a break 0 0 0 1   Lying down to rest in the afternoon when circumstances permit 3 3 3 3   Sitting and talking to someone 0 0 0 0   Sitting quietly after a lunch without alcohol 0 0 3 1   In a car, while stopped for a few minutes in

## 2024-03-27 ENCOUNTER — OFFICE VISIT (OUTPATIENT)
Dept: SLEEP MEDICINE | Age: 64
End: 2024-03-27
Payer: MEDICARE

## 2024-03-27 VITALS
HEART RATE: 75 BPM | SYSTOLIC BLOOD PRESSURE: 145 MMHG | DIASTOLIC BLOOD PRESSURE: 75 MMHG | HEIGHT: 64 IN | WEIGHT: 239 LBS | OXYGEN SATURATION: 95 % | BODY MASS INDEX: 40.8 KG/M2

## 2024-03-27 DIAGNOSIS — G47.34 NOCTURNAL HYPOXEMIA: ICD-10-CM

## 2024-03-27 DIAGNOSIS — E66.01 CLASS 3 SEVERE OBESITY DUE TO EXCESS CALORIES WITHOUT SERIOUS COMORBIDITY WITH BODY MASS INDEX (BMI) OF 40.0 TO 44.9 IN ADULT (HCC): ICD-10-CM

## 2024-03-27 DIAGNOSIS — G47.33 OSA (OBSTRUCTIVE SLEEP APNEA): Primary | ICD-10-CM

## 2024-03-27 PROBLEM — E66.813 CLASS 3 SEVERE OBESITY DUE TO EXCESS CALORIES WITHOUT SERIOUS COMORBIDITY WITH BODY MASS INDEX (BMI) OF 40.0 TO 44.9 IN ADULT: Status: ACTIVE | Noted: 2021-04-07

## 2024-03-27 PROCEDURE — 1090F PRES/ABSN URINE INCON ASSESS: CPT | Performed by: NURSE PRACTITIONER

## 2024-03-27 PROCEDURE — G8399 PT W/DXA RESULTS DOCUMENT: HCPCS | Performed by: NURSE PRACTITIONER

## 2024-03-27 PROCEDURE — G8427 DOCREV CUR MEDS BY ELIG CLIN: HCPCS | Performed by: NURSE PRACTITIONER

## 2024-03-27 PROCEDURE — 1123F ACP DISCUSS/DSCN MKR DOCD: CPT | Performed by: NURSE PRACTITIONER

## 2024-03-27 PROCEDURE — 99213 OFFICE O/P EST LOW 20 MIN: CPT | Performed by: NURSE PRACTITIONER

## 2024-03-27 PROCEDURE — 3017F COLORECTAL CA SCREEN DOC REV: CPT | Performed by: NURSE PRACTITIONER

## 2024-03-27 PROCEDURE — G8484 FLU IMMUNIZE NO ADMIN: HCPCS | Performed by: NURSE PRACTITIONER

## 2024-03-27 PROCEDURE — 1036F TOBACCO NON-USER: CPT | Performed by: NURSE PRACTITIONER

## 2024-03-27 PROCEDURE — G8417 CALC BMI ABV UP PARAM F/U: HCPCS | Performed by: NURSE PRACTITIONER

## 2024-03-27 ASSESSMENT — SLEEP AND FATIGUE QUESTIONNAIRES
HOW LIKELY ARE YOU TO NOD OFF OR FALL ASLEEP IN A CAR, WHILE STOPPED FOR A FEW MINUTES IN TRAFFIC: WOULD NEVER DOZE
HOW LIKELY ARE YOU TO NOD OFF OR FALL ASLEEP WHILE SITTING AND READING: SLIGHT CHANCE OF DOZING
HOW LIKELY ARE YOU TO NOD OFF OR FALL ASLEEP WHILE SITTING INACTIVE IN A PUBLIC PLACE: WOULD NEVER DOZE
HOW LIKELY ARE YOU TO NOD OFF OR FALL ASLEEP WHILE WATCHING TV: WOULD NEVER DOZE
ESS TOTAL SCORE: 4
HOW LIKELY ARE YOU TO NOD OFF OR FALL ASLEEP WHILE LYING DOWN TO REST IN THE AFTERNOON WHEN CIRCUMSTANCES PERMIT: HIGH CHANCE OF DOZING
HOW LIKELY ARE YOU TO NOD OFF OR FALL ASLEEP WHILE SITTING QUIETLY AFTER LUNCH WITHOUT ALCOHOL: WOULD NEVER DOZE
HOW LIKELY ARE YOU TO NOD OFF OR FALL ASLEEP WHEN YOU ARE A PASSENGER IN A CAR FOR AN HOUR WITHOUT A BREAK: WOULD NEVER DOZE
HOW LIKELY ARE YOU TO NOD OFF OR FALL ASLEEP WHILE SITTING AND TALKING TO SOMEONE: WOULD NEVER DOZE

## 2024-05-10 DIAGNOSIS — R60.0 BILATERAL LOWER EXTREMITY EDEMA: ICD-10-CM

## 2024-05-10 DIAGNOSIS — I10 ESSENTIAL HYPERTENSION: ICD-10-CM

## 2024-05-10 RX ORDER — SPIRONOLACTONE 25 MG/1
25 TABLET ORAL DAILY
Qty: 90 TABLET | Refills: 3 | Status: SHIPPED | OUTPATIENT
Start: 2024-05-10

## 2024-05-10 RX ORDER — AMLODIPINE BESYLATE 2.5 MG/1
5 TABLET ORAL DAILY
Qty: 90 TABLET | Refills: 3 | Status: SHIPPED | OUTPATIENT
Start: 2024-05-10

## 2024-05-10 NOTE — TELEPHONE ENCOUNTER
MEDICATION REFILL REQUEST      Name of Medication:  Amlodipine  Dose:  2.5 mg  Frequency:  BID  Quantity:  180  Days' supply:  90 with 3 refills      Pharmacy Name/Location:  [facility]    MEDICATION REFILL REQUEST      Name of Medication:  Spironolactone  Dose:  25 mg  Frequency:  QD  Quantity:  90  Days' supply:  90 with 3 refills      Pharmacy Name/Location:  [facility]

## 2024-05-10 NOTE — TELEPHONE ENCOUNTER
Requested Prescriptions     Pending Prescriptions Disp Refills    amLODIPine (NORVASC) 2.5 MG tablet 90 tablet 3     Sig: Take 2 tablets by mouth daily    spironolactone (ALDACTONE) 25 MG tablet 90 tablet 3     Sig: Take 1 tablet by mouth daily

## 2024-06-25 DIAGNOSIS — I10 ESSENTIAL HYPERTENSION: ICD-10-CM

## 2024-06-25 DIAGNOSIS — R60.0 BILATERAL LOWER EXTREMITY EDEMA: ICD-10-CM

## 2024-06-25 RX ORDER — AMLODIPINE BESYLATE 2.5 MG/1
5 TABLET ORAL DAILY
Qty: 90 TABLET | Refills: 3 | Status: SHIPPED | OUTPATIENT
Start: 2024-06-25

## 2024-06-25 RX ORDER — SPIRONOLACTONE 25 MG/1
25 TABLET ORAL DAILY
Qty: 90 TABLET | Refills: 3 | Status: SHIPPED | OUTPATIENT
Start: 2024-06-25

## 2024-06-25 NOTE — TELEPHONE ENCOUNTER
Requested Prescriptions     Pending Prescriptions Disp Refills    spironolactone (ALDACTONE) 25 MG tablet 90 tablet 3     Sig: Take 1 tablet by mouth daily    amLODIPine (NORVASC) 2.5 MG tablet 90 tablet 3     Sig: Take 2 tablets by mouth daily

## 2024-08-09 ENCOUNTER — OFFICE VISIT (OUTPATIENT)
Age: 64
End: 2024-08-09
Payer: MEDICARE

## 2024-08-09 VITALS
BODY MASS INDEX: 41.66 KG/M2 | HEIGHT: 64 IN | SYSTOLIC BLOOD PRESSURE: 138 MMHG | HEART RATE: 78 BPM | DIASTOLIC BLOOD PRESSURE: 80 MMHG | WEIGHT: 244 LBS

## 2024-08-09 DIAGNOSIS — E66.01 MORBID OBESITY WITH BMI OF 40.0-44.9, ADULT (HCC): ICD-10-CM

## 2024-08-09 DIAGNOSIS — I10 ESSENTIAL HYPERTENSION: ICD-10-CM

## 2024-08-09 DIAGNOSIS — R09.02 HYPOXEMIA: ICD-10-CM

## 2024-08-09 DIAGNOSIS — G47.34 NOCTURNAL HYPOXEMIA: ICD-10-CM

## 2024-08-09 DIAGNOSIS — R60.0 BILATERAL LOWER EXTREMITY EDEMA: ICD-10-CM

## 2024-08-09 DIAGNOSIS — E78.2 MIXED HYPERLIPIDEMIA: ICD-10-CM

## 2024-08-09 DIAGNOSIS — G47.33 OSA (OBSTRUCTIVE SLEEP APNEA): ICD-10-CM

## 2024-08-09 DIAGNOSIS — R06.09 DYSPNEA ON EXERTION: Primary | ICD-10-CM

## 2024-08-09 PROCEDURE — 1123F ACP DISCUSS/DSCN MKR DOCD: CPT | Performed by: INTERNAL MEDICINE

## 2024-08-09 PROCEDURE — 1036F TOBACCO NON-USER: CPT | Performed by: INTERNAL MEDICINE

## 2024-08-09 PROCEDURE — 3017F COLORECTAL CA SCREEN DOC REV: CPT | Performed by: INTERNAL MEDICINE

## 2024-08-09 PROCEDURE — G8427 DOCREV CUR MEDS BY ELIG CLIN: HCPCS | Performed by: INTERNAL MEDICINE

## 2024-08-09 PROCEDURE — 3075F SYST BP GE 130 - 139MM HG: CPT | Performed by: INTERNAL MEDICINE

## 2024-08-09 PROCEDURE — 93000 ELECTROCARDIOGRAM COMPLETE: CPT | Performed by: INTERNAL MEDICINE

## 2024-08-09 PROCEDURE — 99214 OFFICE O/P EST MOD 30 MIN: CPT | Performed by: INTERNAL MEDICINE

## 2024-08-09 PROCEDURE — G8417 CALC BMI ABV UP PARAM F/U: HCPCS | Performed by: INTERNAL MEDICINE

## 2024-08-09 PROCEDURE — 1090F PRES/ABSN URINE INCON ASSESS: CPT | Performed by: INTERNAL MEDICINE

## 2024-08-09 PROCEDURE — G8399 PT W/DXA RESULTS DOCUMENT: HCPCS | Performed by: INTERNAL MEDICINE

## 2024-08-09 PROCEDURE — 3079F DIAST BP 80-89 MM HG: CPT | Performed by: INTERNAL MEDICINE

## 2024-08-09 RX ORDER — METHOTREXATE 2.5 MG/1
15 TABLET ORAL WEEKLY
COMMUNITY
Start: 2024-07-11

## 2024-08-09 ASSESSMENT — ENCOUNTER SYMPTOMS
ABDOMINAL PAIN: 0
DOUBLE VISION: 0
HEMOPTYSIS: 0
WHEEZING: 0
HEMATEMESIS: 0
HEMATOCHEZIA: 0
STRIDOR: 0
EYE REDNESS: 0
HOARSE VOICE: 0

## 2024-08-09 NOTE — PROGRESS NOTES
exertion-still present-feels that it is due to her recent COPD exacerbation.  No associated chest pain.    Hyperlipidemia-remains on atorvastatin therapy and tolerates this well with no significant myalgias.    Hypertension: Pressures are much better on her current dosing of amlodipine and spironolactone.  No headaches or blurry vision.  She came off carvedilol as it was aggravating her dyspnea with COPD.    -Obesity-understands the need for weight loss.  Had lost a lot of weight on a diabetic diet previously but has gained back quite a few pounds since.    Past Medical History, Past Surgical History, Family history, Social History, and Medications were all reviewed with the patient today and updated as necessary.     Allergies   Allergen Reactions    Carvedilol Other (See Comments)     Intolerance due to COPD    Oxycodone Nausea And Vomiting and Swelling     Pt states having great swelling with Oxycodone     Patient Active Problem List   Diagnosis    Incomplete bladder emptying    GERD (gastroesophageal reflux disease)    Recent weight gain    COLUMBA (obstructive sleep apnea)    Hyperlipidemia    Moderate episode of recurrent major depressive disorder (HCC)    Vitamin D deficiency    Allergic rhinitis    COPD (chronic obstructive pulmonary disease) ([facility])    Xerostomia    Anxiety    Neurogenic bladder    Acquired hypothyroidism    Class 3 severe obesity due to excess calories without serious comorbidity with body mass index (BMI) of 40.0 to 44.9 in adult (HCC)    Postprandial epigastric pain    Esophagus, Dubose's    Osteopenia of multiple sites    Localized swelling of both lower legs    Insomnia    Hypoxemia    Difficulty using continuous positive airway pressure (CPAP) nasal mask    Nocturnal hypoxemia     Past Medical History:   Diagnosis Date    Allergic rhinitis 4/3/2014    Anxiety 5/30/2014    Asthma 5/30/2014    Circadian rhythm disorder     COPD (chronic obstructive pulmonary disease) ([facility]) 4/3/2014    COVID

## 2024-08-15 DIAGNOSIS — J44.9 COPD, MILD (HCC): ICD-10-CM

## 2024-08-21 ENCOUNTER — PATIENT MESSAGE (OUTPATIENT)
Age: 64
End: 2024-08-21

## 2024-08-21 ENCOUNTER — PATIENT MESSAGE (OUTPATIENT)
Dept: PULMONOLOGY | Age: 64
End: 2024-08-21

## 2024-08-21 DIAGNOSIS — I10 ESSENTIAL HYPERTENSION: ICD-10-CM

## 2024-08-21 DIAGNOSIS — K21.9 GASTROESOPHAGEAL REFLUX DISEASE WITHOUT ESOPHAGITIS: Primary | ICD-10-CM

## 2024-08-21 DIAGNOSIS — J44.9 COPD, MILD (HCC): ICD-10-CM

## 2024-08-21 DIAGNOSIS — R60.0 BILATERAL LOWER EXTREMITY EDEMA: ICD-10-CM

## 2024-08-21 RX ORDER — FAMOTIDINE 20 MG/1
20 TABLET, FILM COATED ORAL
Qty: 90 TABLET | Refills: 3 | Status: SHIPPED | OUTPATIENT
Start: 2024-08-21 | End: 2024-08-21 | Stop reason: SDUPTHER

## 2024-08-21 RX ORDER — FAMOTIDINE 20 MG/1
20 TABLET, FILM COATED ORAL
Qty: 90 TABLET | Refills: 3 | Status: SHIPPED | OUTPATIENT
Start: 2024-08-21

## 2024-08-21 RX ORDER — SPIRONOLACTONE 25 MG/1
25 TABLET ORAL DAILY
Qty: 90 TABLET | Refills: 3 | Status: SHIPPED | OUTPATIENT
Start: 2024-08-21

## 2024-08-22 RX ORDER — TIOTROPIUM BROMIDE INHALATION SPRAY 3.12 UG/1
SPRAY, METERED RESPIRATORY (INHALATION)
Qty: 12 G | Refills: 3 | Status: SHIPPED | OUTPATIENT
Start: 2024-08-22

## 2024-08-22 NOTE — TELEPHONE ENCOUNTER
Patients pharmacy requesting a refill on patients SPIRIVA RESPIMAT 2.5 MCG/ACT AERS inhalers. Last seen on 03/5/2024 and has a return appointment on 12/23/2024. SUKI

## 2024-08-27 RX ORDER — ALBUTEROL SULFATE 0.83 MG/ML
SOLUTION RESPIRATORY (INHALATION)
Qty: 360 ML | Refills: 5 | Status: SHIPPED | OUTPATIENT
Start: 2024-08-27

## 2024-09-04 DIAGNOSIS — K21.9 GASTROESOPHAGEAL REFLUX DISEASE WITHOUT ESOPHAGITIS: ICD-10-CM

## 2024-09-04 RX ORDER — PANTOPRAZOLE SODIUM 40 MG/1
40 TABLET, DELAYED RELEASE ORAL 2 TIMES DAILY
Qty: 180 TABLET | Refills: 3 | Status: SHIPPED | OUTPATIENT
Start: 2024-09-04

## 2024-09-04 NOTE — TELEPHONE ENCOUNTER
Patient requesting a refill on her  pantoprazole (PROTONIX) 40 MG tablets. Last seen on 03/05/24 and has a return appointment on 12/23/24. SUKI

## 2024-10-17 ENCOUNTER — PATIENT MESSAGE (OUTPATIENT)
Dept: PULMONOLOGY | Age: 64
End: 2024-10-17

## 2024-11-21 ENCOUNTER — PATIENT MESSAGE (OUTPATIENT)
Dept: PULMONOLOGY | Age: 64
End: 2024-11-21

## 2024-11-23 RX ORDER — DOXYCYCLINE HYCLATE 100 MG
100 TABLET ORAL 2 TIMES DAILY
Qty: 14 TABLET | Refills: 0 | Status: SHIPPED | OUTPATIENT
Start: 2024-11-23 | End: 2024-11-30

## 2024-11-23 RX ORDER — PREDNISONE 20 MG/1
TABLET ORAL
Qty: 15 TABLET | Refills: 0 | Status: SHIPPED | OUTPATIENT
Start: 2024-11-23

## 2024-12-19 NOTE — PROGRESS NOTES
Name:  Anette Reynolds  YOB: 1960   MRN: [id number]      Office Visit: 12/23/2024       Assessment & Plan (Medical Decision Making)    Impression: 64 y.o. female     1. COPD, mild (HCC)  --recent exacerbation requiring steroids and antibiotics.  Continue Spiriva and Advair.  Has some lingering congestion.  Resume Mucinex 1200 mg bid and albuterol in nebulizer 3-4 times per day until symptoms return to baseline.    2. Gastroesophageal reflux disease without esophagitis  --controlled on PPI.  Continue current therapy.    3. Personal history of tobacco use  ---30 pack years--quit in 2011.  Last CT of chest was 9/13/2024--will need LDCT in September, 2025 which we will arrange at next visit.     No orders of the defined types were placed in this encounter.    No orders of the defined types were placed in this encounter.    Follow-up and Dispositions    Return in about 6 months (around 6/23/2025) for July COPD, spirometry, Arrive 15 minutes prior to appt time.       Collaborating physician is Dr. Joe Cisneros, APRN - CNP      _________________________________________________________________________    HISTORY OF PRESENT ILLNESS:    Ms. Anette Reynolds is a 64 y.o. female who is seen at [facility] for  COPD     The patient is a 64 year old female who is seen for follow up of COPD.  She is accompanied by her .  She has a 30-pack-year history of cigarette smoking, but quit smoking in 2011.  She had a screening CT scan in August, 2022 which did not reveal any worrisome findings for lung cancer.  She remains on Advair and Spiriva.  Had an exacerbation in November which required steroids and antibiotics.  She continues to  have some chest congestion and cough, but no wheezing.    Reflux has been controlled.    Had Covid in March, 2024.      REVIEW OF SYSTEMS: 10 point review of systems is negative except as reported in HPI.    PHYSICAL EXAM: Body mass index is

## 2024-12-23 ENCOUNTER — OFFICE VISIT (OUTPATIENT)
Dept: PULMONOLOGY | Age: 64
End: 2024-12-23
Payer: MEDICARE

## 2024-12-23 VITALS
OXYGEN SATURATION: 98 % | TEMPERATURE: 97 F | WEIGHT: 240.2 LBS | HEIGHT: 64 IN | HEART RATE: 79 BPM | BODY MASS INDEX: 41.01 KG/M2 | RESPIRATION RATE: 19 BRPM | DIASTOLIC BLOOD PRESSURE: 70 MMHG | SYSTOLIC BLOOD PRESSURE: 124 MMHG

## 2024-12-23 DIAGNOSIS — K21.9 GASTROESOPHAGEAL REFLUX DISEASE WITHOUT ESOPHAGITIS: ICD-10-CM

## 2024-12-23 DIAGNOSIS — Z87.891 PERSONAL HISTORY OF TOBACCO USE: ICD-10-CM

## 2024-12-23 DIAGNOSIS — J44.9 COPD, MILD (HCC): Primary | ICD-10-CM

## 2024-12-23 PROCEDURE — 1159F MED LIST DOCD IN RCRD: CPT | Performed by: NURSE PRACTITIONER

## 2024-12-23 PROCEDURE — 1090F PRES/ABSN URINE INCON ASSESS: CPT | Performed by: NURSE PRACTITIONER

## 2024-12-23 PROCEDURE — G8427 DOCREV CUR MEDS BY ELIG CLIN: HCPCS | Performed by: NURSE PRACTITIONER

## 2024-12-23 PROCEDURE — G8417 CALC BMI ABV UP PARAM F/U: HCPCS | Performed by: NURSE PRACTITIONER

## 2024-12-23 PROCEDURE — 1123F ACP DISCUSS/DSCN MKR DOCD: CPT | Performed by: NURSE PRACTITIONER

## 2024-12-23 PROCEDURE — G8399 PT W/DXA RESULTS DOCUMENT: HCPCS | Performed by: NURSE PRACTITIONER

## 2024-12-23 PROCEDURE — 1160F RVW MEDS BY RX/DR IN RCRD: CPT | Performed by: NURSE PRACTITIONER

## 2024-12-23 PROCEDURE — 1126F AMNT PAIN NOTED NONE PRSNT: CPT | Performed by: NURSE PRACTITIONER

## 2024-12-23 PROCEDURE — 1036F TOBACCO NON-USER: CPT | Performed by: NURSE PRACTITIONER

## 2024-12-23 PROCEDURE — 3023F SPIROM DOC REV: CPT | Performed by: NURSE PRACTITIONER

## 2024-12-23 PROCEDURE — G8484 FLU IMMUNIZE NO ADMIN: HCPCS | Performed by: NURSE PRACTITIONER

## 2024-12-23 PROCEDURE — G2211 COMPLEX E/M VISIT ADD ON: HCPCS | Performed by: NURSE PRACTITIONER

## 2024-12-23 PROCEDURE — 3017F COLORECTAL CA SCREEN DOC REV: CPT | Performed by: NURSE PRACTITIONER

## 2024-12-23 PROCEDURE — 99214 OFFICE O/P EST MOD 30 MIN: CPT | Performed by: NURSE PRACTITIONER

## 2024-12-23 RX ORDER — SULINDAC 150 MG/1
150 TABLET ORAL 2 TIMES DAILY
COMMUNITY
Start: 2024-10-22

## 2024-12-23 RX ORDER — SEMAGLUTIDE 0.68 MG/ML
0.25 INJECTION, SOLUTION SUBCUTANEOUS
COMMUNITY
Start: 2024-11-26 | End: 2025-02-24

## 2024-12-26 DIAGNOSIS — K21.9 GASTROESOPHAGEAL REFLUX DISEASE WITHOUT ESOPHAGITIS: ICD-10-CM

## 2024-12-30 RX ORDER — PANTOPRAZOLE SODIUM 40 MG/1
TABLET, DELAYED RELEASE ORAL
Refills: 0 | OUTPATIENT
Start: 2024-12-30

## 2025-01-22 ENCOUNTER — TELEPHONE (OUTPATIENT)
Dept: PULMONOLOGY | Age: 65
End: 2025-01-22

## 2025-01-22 RX ORDER — DOXYCYCLINE HYCLATE 100 MG
100 TABLET ORAL 2 TIMES DAILY
Qty: 14 TABLET | Refills: 0 | Status: SHIPPED | OUTPATIENT
Start: 2025-01-22 | End: 2025-01-29

## 2025-01-22 RX ORDER — PREDNISONE 20 MG/1
TABLET ORAL
Qty: 15 TABLET | Refills: 0 | Status: SHIPPED | OUTPATIENT
Start: 2025-01-22

## 2025-01-22 NOTE — TELEPHONE ENCOUNTER
Last seen: 12/23/24  Hx: COPD, GERD    Patient call reporting coughing & wheezing, no sputum; some wheezing; no fever/chills, symptoms for 3 days.    Contacted patient & reports symptoms started Sunday, progressive since, yesterday used nebulizer every 4 hours; continues w/ sob, chest is hurting from frequent coughing; not productive; felt a little warm for about an hour; no sinus pressure or congestion; using advair & spiriva as ordered; O2 levels are in mid-high 90s.   Asking if doxy & prednisone course can be called in to pharmacy on file.

## 2025-01-22 NOTE — TELEPHONE ENCOUNTER
TRIAGE CALL      Complaint: Coughing/Wheezing  Cough: yes  Productive:  no  Bloody Sputum:  no  Increased SOB/Wheezing:  yes  Duration: 3 days  Fever/Chills: no  OTC Meds tried: none

## 2025-01-27 ENCOUNTER — TELEPHONE (OUTPATIENT)
Dept: PULMONOLOGY | Age: 65
End: 2025-01-27

## 2025-01-27 ENCOUNTER — OFFICE VISIT (OUTPATIENT)
Dept: UROLOGY | Age: 65
End: 2025-01-27
Payer: MEDICARE

## 2025-01-27 ENCOUNTER — PATIENT MESSAGE (OUTPATIENT)
Dept: PULMONOLOGY | Age: 65
End: 2025-01-27

## 2025-01-27 DIAGNOSIS — N20.0 KIDNEY STONE: Primary | ICD-10-CM

## 2025-01-27 LAB
BILIRUBIN, URINE, POC: NEGATIVE
BLOOD URINE, POC: NEGATIVE
GLUCOSE URINE, POC: NEGATIVE MG/DL
KETONES, URINE, POC: NEGATIVE MG/DL
LEUKOCYTE ESTERASE, URINE, POC: NEGATIVE
NITRITE, URINE, POC: NEGATIVE
PH, URINE, POC: 5.5 (ref 4.6–8)
PROTEIN,URINE, POC: NEGATIVE MG/DL
SPECIFIC GRAVITY, URINE, POC: 1.03 (ref 1–1.03)
URINALYSIS CLARITY, POC: NORMAL
URINALYSIS COLOR, POC: NORMAL
UROBILINOGEN, POC: NORMAL MG/DL

## 2025-01-27 PROCEDURE — 74018 RADEX ABDOMEN 1 VIEW: CPT | Performed by: NURSE PRACTITIONER

## 2025-01-27 PROCEDURE — G8427 DOCREV CUR MEDS BY ELIG CLIN: HCPCS | Performed by: NURSE PRACTITIONER

## 2025-01-27 PROCEDURE — 1036F TOBACCO NON-USER: CPT | Performed by: NURSE PRACTITIONER

## 2025-01-27 PROCEDURE — G8399 PT W/DXA RESULTS DOCUMENT: HCPCS | Performed by: NURSE PRACTITIONER

## 2025-01-27 PROCEDURE — 1123F ACP DISCUSS/DSCN MKR DOCD: CPT | Performed by: NURSE PRACTITIONER

## 2025-01-27 PROCEDURE — 1090F PRES/ABSN URINE INCON ASSESS: CPT | Performed by: NURSE PRACTITIONER

## 2025-01-27 PROCEDURE — 3017F COLORECTAL CA SCREEN DOC REV: CPT | Performed by: NURSE PRACTITIONER

## 2025-01-27 PROCEDURE — 81003 URINALYSIS AUTO W/O SCOPE: CPT | Performed by: NURSE PRACTITIONER

## 2025-01-27 PROCEDURE — 1160F RVW MEDS BY RX/DR IN RCRD: CPT | Performed by: NURSE PRACTITIONER

## 2025-01-27 PROCEDURE — G8417 CALC BMI ABV UP PARAM F/U: HCPCS | Performed by: NURSE PRACTITIONER

## 2025-01-27 PROCEDURE — 99203 OFFICE O/P NEW LOW 30 MIN: CPT | Performed by: NURSE PRACTITIONER

## 2025-01-27 PROCEDURE — 1159F MED LIST DOCD IN RCRD: CPT | Performed by: NURSE PRACTITIONER

## 2025-01-27 ASSESSMENT — ENCOUNTER SYMPTOMS
EYE DISCHARGE: 0
INDIGESTION: 0
NAUSEA: 0
BLOOD IN STOOL: 0
VOMITING: 0
BACK PAIN: 0
EYE PAIN: 0
DIARRHEA: 0
CONSTIPATION: 0
COUGH: 0
HEARTBURN: 0
SHORTNESS OF BREATH: 0
SKIN LESIONS: 0
ABDOMINAL PAIN: 0
WHEEZING: 0
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
## 2025-01-27 NOTE — TELEPHONE ENCOUNTER
TRIAGE CALL      Complaint: Sob /dizziness/cough  Cough: yes  Productive:  yes/no color  Bloody Sputum:  no  Increased SOB/Wheezing:  yes both   Duration: 1/18/25   Fever/Chills: no  OTC Meds tried: no     She has been on prednisone and has not helped her cough

## 2025-01-28 ENCOUNTER — PATIENT MESSAGE (OUTPATIENT)
Dept: PULMONOLOGY | Age: 65
End: 2025-01-28

## 2025-01-28 NOTE — TELEPHONE ENCOUNTER
Last Seen: 12/23/24  COPD, Gastroesophageal reflux disease without esophagitis, 30-pack-year history of cigarette smoking.    Called patient regarding SOB, dizziness, cough.  Per patient she is currently at [facility] waiting to be seen.  Patient states that she has been coughing non-stop with a moist cough.  She is dizzy and has also been having diarrhea.  No fevers noted at this time.  Her O2 has been between 92-95%.  She has been on Doxycycline and Prednisone.  The Doxycycline will be completed tomorrow and the Prednisone should be completed this weekend.  Patient stated that she went to the ER to see if she could be seen and get a CXR done.  I advised patient that based on what she has told me, it sounds like she is in the right place and should continue to seek care at the ER but please do not hesitate to call us back if there is anything else that we can do for her.

## 2025-02-06 ENCOUNTER — HOSPITAL ENCOUNTER (OUTPATIENT)
Dept: ULTRASOUND IMAGING | Age: 65
Discharge: HOME | End: 2025-02-09
Payer: MEDICARE

## 2025-02-06 DIAGNOSIS — N20.0 KIDNEY STONE: ICD-10-CM

## 2025-02-06 PROCEDURE — 76770 US EXAM ABDO BACK WALL COMP: CPT

## 2025-02-13 ENCOUNTER — PATIENT MESSAGE (OUTPATIENT)
Dept: PULMONOLOGY | Age: 65
End: 2025-02-13

## 2025-02-13 RX ORDER — PREDNISONE 20 MG/1
TABLET ORAL
Qty: 15 TABLET | Refills: 0 | Status: SHIPPED | OUTPATIENT
Start: 2025-02-13

## 2025-02-20 NOTE — PROGRESS NOTES
Name:  Anette Reynolds  YOB: 1960   MRN: [id number]      Office Visit: 2/24/2025      The patient (or guardian, if applicable) and other individuals in attendance with the patient were advised that Artificial Intelligence will be utilized during this visit to record, process the conversation to generate a clinical note, and support improvement of the AI technology. The patient (or guardian, if applicable) and other individuals in attendance at the appointment consented to the use of AI, including the recording.         Assessment & Plan (Medical Decision Making)      Impression: 64 y.o. female     Assessment & Plan  1. Influenza A.  She tested positive for Influenza A on January 28, 2024. She has been experiencing persistent coughing and postnasal drainage. It was explained that a cough can persist for 4 to 8 weeks following influenza. Her chest x-ray from January 28, 2024, showed no evidence of pneumonia. She is currently on Advair 500/50. She will be prescribed Astelin (azelastine) nasal spray, 1 spray per nostril twice daily, to alleviate postnasal drainage. The potential side effect of a bitter taste in the mouth was discussed. Additionally, Pulmicort (budesonide) will be added to her nebulizer regimen, to be used twice daily until her upcoming surgery. This will help manage her COPD symptoms and prepare her for surgery. A refill for Pulmicort will be provided.    2. Chronic Obstructive Pulmonary Disease (COPD).  Her COPD symptoms have been exacerbated by the recent influenza infection. She is currently using Advair 500/50 and will continue this medication. Pulmicort (budesonide) will be added to her nebulizer regimen, to be used twice daily until her upcoming surgery. This will help manage her COPD symptoms and prepare her for surgery. A refill for Pulmicort will be provided.    3. Postnasal drainage.  She reports significant postnasal drainage, which has not been adequately managed with

## 2025-02-24 ENCOUNTER — OFFICE VISIT (OUTPATIENT)
Dept: PULMONOLOGY | Age: 65
End: 2025-02-24
Payer: MEDICARE

## 2025-02-24 VITALS
SYSTOLIC BLOOD PRESSURE: 140 MMHG | DIASTOLIC BLOOD PRESSURE: 74 MMHG | HEART RATE: 86 BPM | OXYGEN SATURATION: 98 % | TEMPERATURE: 97.3 F | BODY MASS INDEX: 41.62 KG/M2 | WEIGHT: 243.8 LBS | HEIGHT: 64 IN | RESPIRATION RATE: 19 BRPM

## 2025-02-24 DIAGNOSIS — J10.1 INFLUENZA A: ICD-10-CM

## 2025-02-24 DIAGNOSIS — J44.9 COPD, MILD (HCC): Primary | ICD-10-CM

## 2025-02-24 DIAGNOSIS — R09.82 POST-NASAL DRIP: ICD-10-CM

## 2025-02-24 PROCEDURE — G2211 COMPLEX E/M VISIT ADD ON: HCPCS | Performed by: NURSE PRACTITIONER

## 2025-02-24 PROCEDURE — 1126F AMNT PAIN NOTED NONE PRSNT: CPT | Performed by: NURSE PRACTITIONER

## 2025-02-24 PROCEDURE — 99214 OFFICE O/P EST MOD 30 MIN: CPT | Performed by: NURSE PRACTITIONER

## 2025-02-24 PROCEDURE — G8399 PT W/DXA RESULTS DOCUMENT: HCPCS | Performed by: NURSE PRACTITIONER

## 2025-02-24 PROCEDURE — 1159F MED LIST DOCD IN RCRD: CPT | Performed by: NURSE PRACTITIONER

## 2025-02-24 PROCEDURE — 1123F ACP DISCUSS/DSCN MKR DOCD: CPT | Performed by: NURSE PRACTITIONER

## 2025-02-24 PROCEDURE — G8427 DOCREV CUR MEDS BY ELIG CLIN: HCPCS | Performed by: NURSE PRACTITIONER

## 2025-02-24 PROCEDURE — 3017F COLORECTAL CA SCREEN DOC REV: CPT | Performed by: NURSE PRACTITIONER

## 2025-02-24 PROCEDURE — 1090F PRES/ABSN URINE INCON ASSESS: CPT | Performed by: NURSE PRACTITIONER

## 2025-02-24 PROCEDURE — 1036F TOBACCO NON-USER: CPT | Performed by: NURSE PRACTITIONER

## 2025-02-24 PROCEDURE — 3023F SPIROM DOC REV: CPT | Performed by: NURSE PRACTITIONER

## 2025-02-24 PROCEDURE — G8417 CALC BMI ABV UP PARAM F/U: HCPCS | Performed by: NURSE PRACTITIONER

## 2025-02-24 RX ORDER — ONDANSETRON 4 MG/1
4 TABLET, FILM COATED ORAL
COMMUNITY
Start: 2024-12-18

## 2025-02-24 RX ORDER — AZELASTINE 1 MG/ML
1 SPRAY, METERED NASAL 2 TIMES DAILY
Qty: 60 ML | Refills: 5 | Status: SHIPPED | OUTPATIENT
Start: 2025-02-24

## 2025-02-24 RX ORDER — ALBUTEROL SULFATE 90 UG/1
2 INHALANT RESPIRATORY (INHALATION) EVERY 4 HOURS PRN
Qty: 3 EACH | Refills: 3 | Status: SHIPPED | OUTPATIENT
Start: 2025-02-24

## 2025-02-24 RX ORDER — MONTELUKAST SODIUM 10 MG/1
10 TABLET ORAL DAILY
Qty: 90 TABLET | Refills: 3 | Status: SHIPPED | OUTPATIENT
Start: 2025-02-24

## 2025-02-24 RX ORDER — ROPINIROLE 0.25 MG/1
TABLET, FILM COATED ORAL
COMMUNITY
Start: 2024-11-26

## 2025-02-24 RX ORDER — GABAPENTIN 400 MG/1
CAPSULE ORAL
COMMUNITY
Start: 2024-12-09

## 2025-02-24 RX ORDER — BUSPIRONE HYDROCHLORIDE 10 MG/1
TABLET ORAL
COMMUNITY
Start: 2025-02-11

## 2025-02-24 RX ORDER — CLONIDINE HYDROCHLORIDE 0.1 MG/1
TABLET ORAL
COMMUNITY
Start: 2024-11-26

## 2025-02-24 RX ORDER — ALBUTEROL SULFATE 0.83 MG/ML
2.5 SOLUTION RESPIRATORY (INHALATION) 4 TIMES DAILY
Qty: 360 ML | Refills: 5 | Status: SHIPPED | OUTPATIENT
Start: 2025-02-24

## 2025-02-24 RX ORDER — FLUTICASONE PROPIONATE AND SALMETEROL 500; 50 UG/1; UG/1
1 POWDER RESPIRATORY (INHALATION) 2 TIMES DAILY
Qty: 3 EACH | Refills: 3 | Status: SHIPPED | OUTPATIENT
Start: 2025-02-24

## 2025-02-24 RX ORDER — BUDESONIDE 0.5 MG/2ML
500 INHALANT ORAL 2 TIMES DAILY
Qty: 60 EACH | Refills: 1 | Status: SHIPPED | OUTPATIENT
Start: 2025-02-24

## 2025-03-07 ENCOUNTER — HOSPITAL ENCOUNTER (EMERGENCY)
Age: 65
Discharge: HOME | End: 2025-03-07
Attending: STUDENT IN AN ORGANIZED HEALTH CARE EDUCATION/TRAINING PROGRAM
Payer: MEDICARE

## 2025-03-07 VITALS
RESPIRATION RATE: 17 BRPM | TEMPERATURE: 98.2 F | DIASTOLIC BLOOD PRESSURE: 84 MMHG | SYSTOLIC BLOOD PRESSURE: 132 MMHG | OXYGEN SATURATION: 98 % | HEART RATE: 87 BPM

## 2025-03-07 DIAGNOSIS — S41.112A LACERATION OF LEFT UPPER EXTREMITY, INITIAL ENCOUNTER: Primary | ICD-10-CM

## 2025-03-07 PROCEDURE — 99284 EMERGENCY DEPT VISIT MOD MDM: CPT

## 2025-03-07 PROCEDURE — 6360000002 HC RX W HCPCS: Performed by: STUDENT IN AN ORGANIZED HEALTH CARE EDUCATION/TRAINING PROGRAM

## 2025-03-07 PROCEDURE — 90471 IMMUNIZATION ADMIN: CPT | Performed by: STUDENT IN AN ORGANIZED HEALTH CARE EDUCATION/TRAINING PROGRAM

## 2025-03-07 PROCEDURE — 6370000000 HC RX 637 (ALT 250 FOR IP): Performed by: STUDENT IN AN ORGANIZED HEALTH CARE EDUCATION/TRAINING PROGRAM

## 2025-03-07 PROCEDURE — 12002 RPR S/N/AX/GEN/TRNK2.6-7.5CM: CPT

## 2025-03-07 PROCEDURE — 90714 TD VACC NO PRESV 7 YRS+ IM: CPT | Performed by: STUDENT IN AN ORGANIZED HEALTH CARE EDUCATION/TRAINING PROGRAM

## 2025-03-07 RX ORDER — LIDOCAINE HYDROCHLORIDE AND EPINEPHRINE 10; 10 MG/ML; UG/ML
20 INJECTION, SOLUTION INFILTRATION; PERINEURAL
Status: DISCONTINUED | OUTPATIENT
Start: 2025-03-07 | End: 2025-03-07 | Stop reason: HOSPADM

## 2025-03-07 RX ORDER — HYDROCODONE BITARTRATE AND ACETAMINOPHEN 10; 325 MG/1; MG/1
1 TABLET ORAL
Status: COMPLETED | OUTPATIENT
Start: 2025-03-07 | End: 2025-03-07

## 2025-03-07 RX ORDER — CEPHALEXIN 500 MG/1
500 CAPSULE ORAL 4 TIMES DAILY
Qty: 28 CAPSULE | Refills: 0 | Status: SHIPPED | OUTPATIENT
Start: 2025-03-07 | End: 2025-03-14

## 2025-03-07 RX ADMIN — HYDROCODONE BITARTRATE AND ACETAMINOPHEN 1 TABLET: 10; 325 TABLET ORAL at 15:35

## 2025-03-07 RX ADMIN — CLOSTRIDIUM TETANI TOXOID ANTIGEN (FORMALDEHYDE INACTIVATED) AND CORYNEBACTERIUM DIPHTHERIAE TOXOID ANTIGEN (FORMALDEHYDE INACTIVATED) 0.5 ML: 5; 2 INJECTION, SUSPENSION INTRAMUSCULAR at 15:37

## 2025-03-07 ASSESSMENT — ENCOUNTER SYMPTOMS
ABDOMINAL PAIN: 0
EYE DISCHARGE: 0
VOMITING: 0
WHEEZING: 0
EYE PAIN: 0
SHORTNESS OF BREATH: 0
NAUSEA: 0
SORE THROAT: 0

## 2025-03-07 ASSESSMENT — PAIN SCALES - GENERAL: PAINLEVEL_OUTOF10: 9

## 2025-03-07 NOTE — ED PROVIDER NOTES
Lac Repair    Date/Time: 3/7/2025 4:56 PM    Performed by: Mary Edwards PA-C  Authorized by: Jose Miguel Zuniga MD    Consent:     Consent obtained:  Verbal    Consent given by:  Patient    Risks discussed:  Infection, need for additional repair, poor cosmetic result, pain, poor wound healing and retained foreign body  Universal protocol:     Procedure explained and questions answered to patient or proxy's satisfaction: yes      Patient identity confirmed:  Verbally with patient and arm band  Anesthesia:     Anesthesia method:  Local infiltration    Local anesthetic:  Lidocaine 1% WITH epi  Laceration details:     Location:  Shoulder/arm    Shoulder/arm location:  L upper arm    Length (cm):  5    Depth (mm):  3  Exploration:     Hemostasis achieved with:  Direct pressure    Wound exploration: wound explored through full range of motion and entire depth of wound visualized      Wound extent: no fascia violation noted, no foreign bodies/material noted, no muscle damage noted, no nerve damage noted, no tendon damage noted, no underlying fracture noted and no vascular damage noted    Treatment:     Area cleansed with:  Povidone-iodine and saline    Amount of cleaning:  Extensive    Irrigation solution:  Sterile saline    Irrigation method:  Pressure wash and syringe  Skin repair:     Repair method:  Sutures    Suture size:  4-0    Suture material:  Prolene    Suture technique:  Simple interrupted    Number of sutures:  7  Approximation:     Approximation:  Close  Repair type:     Repair type:  Simple  Post-procedure details:     Dressing:  Non-adherent dressing    Procedure completion:  Tolerated well, no immediate complications         Mary Edwards PA-C  03/07/25 3130

## 2025-03-07 NOTE — ED PROVIDER NOTES
Emergency Department Provider Note       PCP: Stacy Vicente MD   Age: 64 y.o.   Sex: female     DISPOSITION Decision To Discharge 03/07/2025 04:55:31 PM    ICD-10-CM    1. Laceration of left upper extremity, initial encounter  S41.112A           Medical Decision Making     64-year-old female who presents to the ED for laceration of the arm.  Vital signs within normal limits.  No concern for retained foreign body.  No significant injury, do not believe x-ray is warranted at this time.  Tetanus updated.  Laceration repaired as detailed in procedure note by Mary Edwards, irrigated thoroughly.  Gave education on symptomatic management signs/symptoms of infection to watch for.  Will send home with prescription of Keflex.  Hemodynamically stable and neurovascularly intact at time of discharge.    Patient was instructed to follow-up with PCP in the next 24 to 48 hours and to follow-up with all specialists given with discharge as soon as possible.  Patient is nontoxic-appearing and has no complaints at time of discharge.  Instructed to return to the emergency department immediately if current symptoms worsen, or any new/concerning symptoms develop for which they voice understanding.  All questions answered at time of discharge.       1 acute illness with systemic symptoms.  Prescription drug management performed.  Patient was discharged risks and benefits of hospitalization were considered.  Shared medical decision making was utilized in creating the patients health plan today.  I independently ordered and reviewed each unique test.    I reviewed external records: ED visit note from a different ED.   I reviewed external records: provider visit note from PCP.                     History     Ms. Reynolds is a 64-year-old female with PMH of HTN, hypothyroidism who presents to the ED for laceration of arm.  Patient was just seen here for ankle arthrodesis and discharged today.  When going into the bathroom at  per Week: 3 days     On average, how many minutes do you exercise per day?: 30     Total Minutes of Exercise Per Week: 90   Stress: No Stress Concern Present (3/7/2025)    Received from [facility]    Stress     Feeling of Stress : Only a little   Social Connections: Moderately Integrated (3/7/2025)    Received from [facility]    Social Connections     Frequency of Communication with Friends and Family: More than three times a week     Frequency of Social Gatherings with Friends and Family: Once a week   Intimate Partner Violence: Not At Risk (3/7/2025)    Received from [facility]    Intimate Partner Violence     Fear of Current or Ex-Partner: No     Emotionally Abused: No     Physically Abused: No     Sexually Abused: No   Housing Stability: Not At Risk (3/7/2025)    Received from [facility]    Housing Stability     Was there a time when you did not have a steady place to sleep: No     Worried that the place you are staying is making you sick: No        Discharge Medication List as of 3/7/2025  5:26 PM        CONTINUE these medications which have NOT CHANGED    Details   busPIRone (BUSPAR) 10 MG tablet Historical Med      cloNIDine (CATAPRES) 0.1 MG tablet Historical Med      gabapentin (NEURONTIN) 400 MG capsule Historical Med      ondansetron (ZOFRAN) 4 MG tablet Take 1 tablet by mouthHistorical Med      rOPINIRole (REQUIP) 0.25 MG tablet Historical Med      albuterol (PROVENTIL) (2.5 MG/3ML) 0.083% nebulizer solution Take 3 mLs by nebulization 4 times daily, Disp-360 mL, R-5Normal      albuterol sulfate HFA (VENTOLIN HFA) 108 (90 Base) MCG/ACT inhaler Inhale 2 puffs into the lungs every 4 hours as needed for Wheezing, Disp-3 each, R-3Normal      fluticasone-salmeterol (ADVAIR) 500-50 MCG/ACT AEPB diskus inhaler Inhale 1 puff into the lungs in the morning and at bedtime, Disp-3 each, R-3Normal      montelukast (SINGULAIR) 10 MG tablet Take 1 tablet by mouth daily Dx copd j44.9, Disp-90 tablet,

## 2025-03-07 NOTE — ED TRIAGE NOTES
Pt arrives via [facility] coming from home after she slid out of wheelchair. Pt lacerated her L arm after an accident w/ the deadbolt on the door

## 2025-03-27 ENCOUNTER — OFFICE VISIT (OUTPATIENT)
Dept: SLEEP MEDICINE | Age: 65
End: 2025-03-27
Payer: MEDICARE

## 2025-03-27 ENCOUNTER — PATIENT MESSAGE (OUTPATIENT)
Dept: PULMONOLOGY | Age: 65
End: 2025-03-27

## 2025-03-27 VITALS
SYSTOLIC BLOOD PRESSURE: 104 MMHG | DIASTOLIC BLOOD PRESSURE: 66 MMHG | HEIGHT: 64 IN | OXYGEN SATURATION: 93 % | HEART RATE: 80 BPM | RESPIRATION RATE: 18 BRPM | BODY MASS INDEX: 41.85 KG/M2

## 2025-03-27 DIAGNOSIS — G47.33 OSA (OBSTRUCTIVE SLEEP APNEA): Primary | ICD-10-CM

## 2025-03-27 DIAGNOSIS — E66.01 CLASS 3 SEVERE OBESITY DUE TO EXCESS CALORIES WITHOUT SERIOUS COMORBIDITY WITH BODY MASS INDEX (BMI) OF 40.0 TO 44.9 IN ADULT: ICD-10-CM

## 2025-03-27 DIAGNOSIS — G47.34 NOCTURNAL HYPOXEMIA: ICD-10-CM

## 2025-03-27 DIAGNOSIS — E66.813 CLASS 3 SEVERE OBESITY DUE TO EXCESS CALORIES WITHOUT SERIOUS COMORBIDITY WITH BODY MASS INDEX (BMI) OF 40.0 TO 44.9 IN ADULT: ICD-10-CM

## 2025-03-27 DIAGNOSIS — R06.2 WHEEZING ON EXHALATION: ICD-10-CM

## 2025-03-27 PROCEDURE — G8427 DOCREV CUR MEDS BY ELIG CLIN: HCPCS | Performed by: NURSE PRACTITIONER

## 2025-03-27 PROCEDURE — 1160F RVW MEDS BY RX/DR IN RCRD: CPT | Performed by: NURSE PRACTITIONER

## 2025-03-27 PROCEDURE — 1036F TOBACCO NON-USER: CPT | Performed by: NURSE PRACTITIONER

## 2025-03-27 PROCEDURE — 99213 OFFICE O/P EST LOW 20 MIN: CPT | Performed by: NURSE PRACTITIONER

## 2025-03-27 PROCEDURE — 3017F COLORECTAL CA SCREEN DOC REV: CPT | Performed by: NURSE PRACTITIONER

## 2025-03-27 PROCEDURE — 1123F ACP DISCUSS/DSCN MKR DOCD: CPT | Performed by: NURSE PRACTITIONER

## 2025-03-27 PROCEDURE — G2211 COMPLEX E/M VISIT ADD ON: HCPCS | Performed by: NURSE PRACTITIONER

## 2025-03-27 PROCEDURE — G8399 PT W/DXA RESULTS DOCUMENT: HCPCS | Performed by: NURSE PRACTITIONER

## 2025-03-27 PROCEDURE — 1159F MED LIST DOCD IN RCRD: CPT | Performed by: NURSE PRACTITIONER

## 2025-03-27 PROCEDURE — G8417 CALC BMI ABV UP PARAM F/U: HCPCS | Performed by: NURSE PRACTITIONER

## 2025-03-27 PROCEDURE — 1090F PRES/ABSN URINE INCON ASSESS: CPT | Performed by: NURSE PRACTITIONER

## 2025-03-27 RX ORDER — PREDNISONE 20 MG/1
TABLET ORAL
Qty: 15 TABLET | Refills: 0 | Status: SHIPPED | OUTPATIENT
Start: 2025-03-27

## 2025-03-27 ASSESSMENT — SLEEP AND FATIGUE QUESTIONNAIRES
HOW LIKELY ARE YOU TO NOD OFF OR FALL ASLEEP WHILE SITTING AND READING: MODERATE CHANCE OF DOZING
HOW LIKELY ARE YOU TO NOD OFF OR FALL ASLEEP WHILE SITTING INACTIVE IN A PUBLIC PLACE: WOULD NEVER DOZE
HOW LIKELY ARE YOU TO NOD OFF OR FALL ASLEEP WHILE LYING DOWN TO REST IN THE AFTERNOON WHEN CIRCUMSTANCES PERMIT: HIGH CHANCE OF DOZING
HOW LIKELY ARE YOU TO NOD OFF OR FALL ASLEEP IN A CAR, WHILE STOPPED FOR A FEW MINUTES IN TRAFFIC: WOULD NEVER DOZE
HOW LIKELY ARE YOU TO NOD OFF OR FALL ASLEEP WHEN YOU ARE A PASSENGER IN A CAR FOR AN HOUR WITHOUT A BREAK: MODERATE CHANCE OF DOZING
HOW LIKELY ARE YOU TO NOD OFF OR FALL ASLEEP WHILE SITTING AND TALKING TO SOMEONE: WOULD NEVER DOZE
ESS TOTAL SCORE: 9
HOW LIKELY ARE YOU TO NOD OFF OR FALL ASLEEP WHILE SITTING QUIETLY AFTER LUNCH WITHOUT ALCOHOL: WOULD NEVER DOZE
HOW LIKELY ARE YOU TO NOD OFF OR FALL ASLEEP WHILE WATCHING TV: MODERATE CHANCE OF DOZING

## 2025-03-27 NOTE — PROGRESS NOTES
[facility]  [address]  [place] [address]  [phone]    Patient Name:  Anette Reynolds  YOB: 1960      Office Visit 3/27/2025    CHIEF COMPLAINT:    Chief Complaint   Patient presents with    Sleep Apnea         HISTORY OF PRESENT ILLNESS:  Patient is a 64 y.o. female seen today for follow up of COLUMBA. Diagnostic sleep study in 2014 with an AHI of 10.8 and lowest oxygen saturation of 72%. She is prescribed cpap therapy with a humidifier set at 7-15 cm with a nasal mask. Most recent download reveals AHI on PAP therapy is 0.9, leak is median 2.7 and 21.9 at 95th percentile and the hourly usage is 6 hours 59 minutes nightly. The overall use is 2504 hours with days greater than four hours at 341/365. The patient is compliant with the Pap therapy and is feeling better as a result.   Her compliance with CPAP has been very good over the last year.  She reports that she cannot sleep at night without wearing CPAP.  States that she awakens in the mornings refreshed and denies any excessive daytime sleepiness or fatigue.  [place] score is 9/24.  She does report that her CPAP machine has become very loud and does disturb her and her  sleep.  States that her machine is greater than 5 years old.  We will order her a new CPAP machine today.  She denies any major medical changes since her last visit.  Reports that her weight has consistently been around 240 pounds.  Her blood pressure is controlled today.      [place] Sleepiness Scale      3/27/2025     9:02 AM 3/27/2024     3:24 PM 9/6/2023     2:06 PM 5/9/2023     1:07 PM 4/7/2021     2:00 PM   Sleep Medicine   Sitting and reading 2 1 2 3 3   Watching TV 2 0 2 0 3   Sitting, inactive in a public place (e.g. a theatre or a meeting) 0 0 0 0 2   As a passenger in a car for an hour without a break 2 0 0 0 1   Lying down to rest in the afternoon when circumstances permit 3 3 3 3 3   Sitting and talking to someone 0 0 0 0 0

## 2025-03-27 NOTE — PATIENT INSTRUCTIONS
Continue CPAP 7-15 cm H2O with nightly compliance  New CPAP supplies ordered  Recommendations as above  Follow-up in 1 year or sooner if needed

## 2025-06-14 DIAGNOSIS — K21.9 GASTROESOPHAGEAL REFLUX DISEASE WITHOUT ESOPHAGITIS: ICD-10-CM

## 2025-06-17 ENCOUNTER — TELEPHONE (OUTPATIENT)
Age: 65
End: 2025-06-17

## 2025-06-17 NOTE — TELEPHONE ENCOUNTER
MEDICATION REFILL REQUEST      Name of Medication:  Gabapentin  Dose:  400 mg  Frequency:  TID  Quantity:  270  Days' supply:  90 with 3 refills    Pt is out of meds please call in asap  Pharmacy Name/Location:  [facility] pharmacy [phone]

## 2025-07-21 RX ORDER — FAMOTIDINE 20 MG/1
20 TABLET, FILM COATED ORAL NIGHTLY
Qty: 90 TABLET | Refills: 3 | Status: SHIPPED | OUTPATIENT
Start: 2025-07-21

## 2025-08-27 ENCOUNTER — OFFICE VISIT (OUTPATIENT)
Dept: PULMONOLOGY | Age: 65
End: 2025-08-27
Payer: MEDICARE

## 2025-08-27 VITALS
HEART RATE: 76 BPM | WEIGHT: 222.8 LBS | DIASTOLIC BLOOD PRESSURE: 76 MMHG | BODY MASS INDEX: 38.04 KG/M2 | SYSTOLIC BLOOD PRESSURE: 122 MMHG | HEIGHT: 64 IN | OXYGEN SATURATION: 99 % | TEMPERATURE: 98.2 F | RESPIRATION RATE: 18 BRPM

## 2025-08-27 DIAGNOSIS — J30.9 ALLERGIC RHINITIS, UNSPECIFIED SEASONALITY, UNSPECIFIED TRIGGER: ICD-10-CM

## 2025-08-27 DIAGNOSIS — Z87.891 PERSONAL HISTORY OF TOBACCO USE: ICD-10-CM

## 2025-08-27 DIAGNOSIS — E66.9 OBESITY (BMI 30-39.9): ICD-10-CM

## 2025-08-27 DIAGNOSIS — K21.9 GASTROESOPHAGEAL REFLUX DISEASE WITHOUT ESOPHAGITIS: ICD-10-CM

## 2025-08-27 DIAGNOSIS — J44.9 COPD, MILD (HCC): Primary | ICD-10-CM

## 2025-08-27 DIAGNOSIS — Z79.631 ON METHOTREXATE THERAPY: ICD-10-CM

## 2025-08-27 LAB
EXPIRATORY TIME: NORMAL
FEF 25-75% %PRED-PRE: NORMAL
FEF 25-75% PRED: NORMAL
FEF 25-75-PRE: NORMAL
FEV1 %PRED-PRE: 71 %
FEV1 PRED: 2.3 L
FEV1/FVC %PRED-PRE: NORMAL
FEV1/FVC PRED: NORMAL
FEV1/FVC: 74 %
FEV1: 1.64 L
FVC %PRED-PRE: 75 %
FVC PRED: 2.95 L
FVC: 2.22 L
PEF %PRED-PRE: NORMAL
PEF PRED: NORMAL
PEF-PRE: NORMAL

## 2025-08-27 PROCEDURE — 1160F RVW MEDS BY RX/DR IN RCRD: CPT | Performed by: NURSE PRACTITIONER

## 2025-08-27 PROCEDURE — 94010 BREATHING CAPACITY TEST: CPT | Performed by: INTERNAL MEDICINE

## 2025-08-27 PROCEDURE — G8399 PT W/DXA RESULTS DOCUMENT: HCPCS | Performed by: NURSE PRACTITIONER

## 2025-08-27 PROCEDURE — 1123F ACP DISCUSS/DSCN MKR DOCD: CPT | Performed by: NURSE PRACTITIONER

## 2025-08-27 PROCEDURE — 1090F PRES/ABSN URINE INCON ASSESS: CPT | Performed by: NURSE PRACTITIONER

## 2025-08-27 PROCEDURE — 1159F MED LIST DOCD IN RCRD: CPT | Performed by: NURSE PRACTITIONER

## 2025-08-27 PROCEDURE — 99214 OFFICE O/P EST MOD 30 MIN: CPT | Performed by: NURSE PRACTITIONER

## 2025-08-27 PROCEDURE — 3017F COLORECTAL CA SCREEN DOC REV: CPT | Performed by: NURSE PRACTITIONER

## 2025-08-27 PROCEDURE — 1036F TOBACCO NON-USER: CPT | Performed by: NURSE PRACTITIONER

## 2025-08-27 PROCEDURE — G8417 CALC BMI ABV UP PARAM F/U: HCPCS | Performed by: NURSE PRACTITIONER

## 2025-08-27 PROCEDURE — 1126F AMNT PAIN NOTED NONE PRSNT: CPT | Performed by: NURSE PRACTITIONER

## 2025-08-27 PROCEDURE — G8427 DOCREV CUR MEDS BY ELIG CLIN: HCPCS | Performed by: NURSE PRACTITIONER

## 2025-08-27 PROCEDURE — 3023F SPIROM DOC REV: CPT | Performed by: NURSE PRACTITIONER

## 2025-08-27 PROCEDURE — G0296 VISIT TO DETERM LDCT ELIG: HCPCS | Performed by: NURSE PRACTITIONER

## 2025-08-27 RX ORDER — ALPRAZOLAM 1 MG/1
TABLET ORAL
COMMUNITY
Start: 2025-07-07

## 2025-08-27 RX ORDER — FLUTICASONE PROPIONATE AND SALMETEROL 500; 50 UG/1; UG/1
1 POWDER RESPIRATORY (INHALATION) 2 TIMES DAILY
Qty: 3 EACH | Refills: 3 | Status: SHIPPED | OUTPATIENT
Start: 2025-08-27

## 2025-08-27 RX ORDER — VIBEGRON 75 MG/1
TABLET, FILM COATED ORAL
COMMUNITY
Start: 2025-07-14

## 2025-08-27 RX ORDER — ALBUTEROL SULFATE 90 UG/1
2 INHALANT RESPIRATORY (INHALATION) EVERY 4 HOURS PRN
Qty: 3 EACH | Refills: 3 | Status: SHIPPED | OUTPATIENT
Start: 2025-08-27

## 2025-08-27 RX ORDER — SUZETRIGINE 50 MG/1
TABLET, FILM COATED ORAL
COMMUNITY
Start: 2025-08-07

## 2025-08-27 RX ORDER — ALBUTEROL SULFATE 0.83 MG/ML
2.5 SOLUTION RESPIRATORY (INHALATION) 4 TIMES DAILY
Qty: 360 ML | Refills: 5 | Status: SHIPPED | OUTPATIENT
Start: 2025-08-27

## 2025-08-27 RX ORDER — TIRZEPATIDE 2.5 MG/.5ML
2.5 INJECTION, SOLUTION SUBCUTANEOUS
COMMUNITY
Start: 2025-07-21

## 2025-08-27 RX ORDER — MONTELUKAST SODIUM 10 MG/1
10 TABLET ORAL DAILY
Qty: 90 TABLET | Refills: 3 | Status: SHIPPED | OUTPATIENT
Start: 2025-08-27

## 2025-08-27 ASSESSMENT — PULMONARY FUNCTION TESTS
FEV1: 1.64
FVC_PERCENT_PREDICTED_PRE: 75
FVC_PREDICTED: 2.95
FEV1_PREDICTED: 2.30
FVC: 2.22
FEV1/FVC: 74
FEV1_PERCENT_PREDICTED_PRE: 71

## 2025-09-05 ENCOUNTER — HOSPITAL ENCOUNTER (OUTPATIENT)
Dept: CT IMAGING | Age: 65
Discharge: HOME | End: 2025-09-05
Payer: MEDICARE

## 2025-09-05 DIAGNOSIS — Z87.891 PERSONAL HISTORY OF TOBACCO USE: ICD-10-CM

## 2025-09-05 PROCEDURE — 71271 CT THORAX LUNG CANCER SCR C-: CPT
